# Patient Record
Sex: MALE | Race: WHITE | Employment: OTHER | URBAN - METROPOLITAN AREA
[De-identification: names, ages, dates, MRNs, and addresses within clinical notes are randomized per-mention and may not be internally consistent; named-entity substitution may affect disease eponyms.]

---

## 2017-02-13 PROBLEM — I65.29 CAROTID ARTERY STENOSIS: Status: ACTIVE | Noted: 2017-02-13

## 2017-02-16 ENCOUNTER — TELEPHONE (OUTPATIENT)
Dept: FAMILY MEDICINE CLINIC | Facility: CLINIC | Age: 72
End: 2017-02-16

## 2017-03-30 PROBLEM — I77.811 ECTATIC ABDOMINAL AORTA: Status: ACTIVE | Noted: 2017-03-30

## 2017-03-30 PROBLEM — I77.811 ECTATIC ABDOMINAL AORTA (HCC): Status: ACTIVE | Noted: 2017-03-30

## 2017-03-30 PROBLEM — I77.9 CAROTID ARTERY DISEASE: Status: ACTIVE | Noted: 2017-02-13

## 2017-03-30 PROBLEM — F10.21 ALCOHOL DEPENDENCE IN REMISSION (HCC): Status: ACTIVE | Noted: 2017-03-30

## 2017-03-30 PROBLEM — I70.0 ATHEROSCLEROSIS OF ABDOMINAL AORTA (HCC): Status: ACTIVE | Noted: 2017-03-30

## 2017-03-30 PROBLEM — I70.0 ATHEROSCLEROSIS OF ABDOMINAL AORTA: Status: ACTIVE | Noted: 2017-03-30

## 2017-03-30 PROBLEM — I70.0 AORTO-ILIAC ATHEROSCLEROSIS: Status: ACTIVE | Noted: 2017-03-30

## 2017-03-30 PROBLEM — I70.8 AORTO-ILIAC ATHEROSCLEROSIS: Status: ACTIVE | Noted: 2017-03-30

## 2017-03-30 PROBLEM — I70.8 AORTO-ILIAC ATHEROSCLEROSIS (HCC): Status: ACTIVE | Noted: 2017-03-30

## 2017-03-30 PROBLEM — I70.0 AORTO-ILIAC ATHEROSCLEROSIS (HCC): Status: ACTIVE | Noted: 2017-03-30

## 2017-03-30 PROBLEM — I77.9 CAROTID ARTERY DISEASE (HCC): Status: ACTIVE | Noted: 2017-02-13

## 2017-03-30 PROBLEM — I70.8 AORTO-ILIAC ATHEROSCLEROSIS  (HCC): Status: ACTIVE | Noted: 2017-03-30

## 2017-03-30 PROBLEM — I70.0 AORTO-ILIAC ATHEROSCLEROSIS  (HCC): Status: ACTIVE | Noted: 2017-03-30

## 2017-07-23 PROBLEM — I77.819 AORTIC ECTASIA (HCC): Status: ACTIVE | Noted: 2017-07-23

## 2017-07-23 PROBLEM — I77.819 AORTIC ECTASIA: Status: ACTIVE | Noted: 2017-07-23

## 2017-07-24 ENCOUNTER — MA CHART PREP (OUTPATIENT)
Dept: FAMILY MEDICINE CLINIC | Facility: CLINIC | Age: 72
End: 2017-07-24

## 2017-07-25 ENCOUNTER — OFFICE VISIT (OUTPATIENT)
Dept: FAMILY MEDICINE CLINIC | Facility: CLINIC | Age: 72
End: 2017-07-25

## 2017-07-25 ENCOUNTER — PATIENT MESSAGE (OUTPATIENT)
Dept: FAMILY MEDICINE CLINIC | Facility: CLINIC | Age: 72
End: 2017-07-25

## 2017-07-25 VITALS
BODY MASS INDEX: 21.81 KG/M2 | RESPIRATION RATE: 20 BRPM | TEMPERATURE: 99 F | OXYGEN SATURATION: 98 % | HEIGHT: 72 IN | SYSTOLIC BLOOD PRESSURE: 134 MMHG | DIASTOLIC BLOOD PRESSURE: 70 MMHG | HEART RATE: 55 BPM | WEIGHT: 161 LBS

## 2017-07-25 DIAGNOSIS — E78.00 HYPERCHOLESTEROLEMIA: ICD-10-CM

## 2017-07-25 DIAGNOSIS — I70.0 AORTO-ILIAC ATHEROSCLEROSIS (HCC): ICD-10-CM

## 2017-07-25 DIAGNOSIS — I10 ESSENTIAL HYPERTENSION: ICD-10-CM

## 2017-07-25 DIAGNOSIS — Z95.5 HISTORY OF HEART ARTERY STENT: ICD-10-CM

## 2017-07-25 DIAGNOSIS — I77.9 CAROTID ARTERY DISEASE, UNSPECIFIED LATERALITY (HCC): ICD-10-CM

## 2017-07-25 DIAGNOSIS — N40.2 PROSTATE NODULE: ICD-10-CM

## 2017-07-25 DIAGNOSIS — N40.1 BENIGN PROSTATIC HYPERPLASIA WITH WEAK URINARY STREAM: Primary | ICD-10-CM

## 2017-07-25 DIAGNOSIS — I70.8 AORTO-ILIAC ATHEROSCLEROSIS (HCC): ICD-10-CM

## 2017-07-25 DIAGNOSIS — N52.9 ED (ERECTILE DYSFUNCTION) OF ORGANIC ORIGIN: ICD-10-CM

## 2017-07-25 DIAGNOSIS — R39.12 BENIGN PROSTATIC HYPERPLASIA WITH WEAK URINARY STREAM: Primary | ICD-10-CM

## 2017-07-25 PROCEDURE — 96160 PT-FOCUSED HLTH RISK ASSMT: CPT | Performed by: INTERNAL MEDICINE

## 2017-07-25 RX ORDER — CLOPIDOGREL BISULFATE 75 MG/1
75 TABLET ORAL DAILY
Qty: 90 TABLET | Refills: 4 | Status: SHIPPED | OUTPATIENT
Start: 2017-07-25 | End: 2018-07-26

## 2017-07-25 RX ORDER — RANITIDINE 300 MG/1
TABLET ORAL
Qty: 90 TABLET | Refills: 4 | Status: SHIPPED | OUTPATIENT
Start: 2017-07-25 | End: 2018-01-04

## 2017-07-25 RX ORDER — METOPROLOL SUCCINATE 25 MG/1
25 TABLET, EXTENDED RELEASE ORAL DAILY
Qty: 90 TABLET | Refills: 4 | Status: SHIPPED | OUTPATIENT
Start: 2017-07-25 | End: 2018-08-08

## 2017-07-25 RX ORDER — LOSARTAN POTASSIUM 50 MG/1
50 TABLET ORAL DAILY
Qty: 90 TABLET | Refills: 4 | Status: SHIPPED | OUTPATIENT
Start: 2017-07-25 | End: 2018-08-08

## 2017-07-25 RX ORDER — SIMVASTATIN 20 MG
20 TABLET ORAL NIGHTLY
Qty: 90 TABLET | Refills: 4 | Status: SHIPPED | OUTPATIENT
Start: 2017-07-25 | End: 2017-08-17

## 2017-07-25 RX ORDER — FINASTERIDE 5 MG/1
5 TABLET, FILM COATED ORAL
Qty: 90 TABLET | Refills: 4 | Status: SHIPPED | OUTPATIENT
Start: 2017-07-25 | End: 2018-01-04

## 2017-07-25 NOTE — PROGRESS NOTES
James Mayfield is a 74year old male who presents for a MA Supervisit. No c/os today.      Patient Care Team: Patient Care Team:  Daly Mcwilliams DO as PCP - Silvia Bean MD as Consulting Physician (CARDIOLOGY)    Patient Active Pro kg/m².       Lab Results  Component Value Date   GLU 93 07/26/2016   GLU 88 07/02/2015   GLU 98 08/05/2014       Lab Results  Component Value Date   CHOLEST 132 07/26/2016   CHOLEST 155 07/02/2015   CHOLEST 147 08/05/2014       Lab Results  Component Value help    Taking medications as prescribed: Able without help    Are you able to afford your medications?: Yes    Hearing Problems?: No     Functional Status     Hearing Problems?: No    Vision Problems? : Yes    Difficulty walking?: No    Difficulty dressin Date Value   07/02/2015 88   ----------    Cardiovascular Disease Screening     LDL Annually LDL-CHOLESTEROL (mg/dL (calc))   Date Value   07/02/2015 79     LDL Cholesterol (mg/dL)   Date Value   07/26/2016 54        EKG - w/ Initial Preventative Physica Creatinine  Annually CREATININE (mg/dL)   Date Value   07/02/2015 1.04     Creatinine (mg/dL)   Date Value   07/26/2016 1.01    No flowsheet data found. Digoxin Serum Conc  Annually No results found for: DIGOXIN No flowsheet data found.     Diabetes mouth daily.  Disp:  Rfl:       MEDICAL INFORMATION:   Past Medical History:   Diagnosis Date   • Adverse drug reaction 12/10/2014   • Benign neoplasm of colon    • Diverticulosis of colon (without mention of hemorrhage)    • Hx of heart artery stent    • U Readings from Last 3 Encounters:  07/25/17 : 134/70  12/15/16 : 126/78  07/25/16 : 132/74      GENERAL: well developed, well nourished, in no apparent distress  SKIN: no rashes, no suspicious lesions  HEENT: atraumatic, normocephalic, ears and throat are c -     Continue Cardiology monitoring  -     LIPID PANEL; Future  -     CARDIO - INTERNAL    Aorto-iliac atherosclerosis ([address] stable        -     Continue Cardiology monitoring  -     LIPID PANEL;  Future    ED (erectile dysfunction) of organic origin

## 2017-07-25 NOTE — PATIENT INSTRUCTIONS
LAB HOURS & LOCATIONS        Doreen  [facility])    [address] [place] [address].  [address] [place] [address]     ([address])         [address] [place] [address] Ave    Mon-Fri  5am-8pm     Mon-Fri   7am-4pm  Sat         6am-3pm     Sat          7am-3pm      Isabel Diop

## 2017-08-08 PROBLEM — F10.21 ALCOHOL DEPENDENCE IN REMISSION (HCC): Status: RESOLVED | Noted: 2017-03-30 | Resolved: 2017-08-08

## 2017-08-17 DIAGNOSIS — E78.00 HYPERCHOLESTEROLEMIA: ICD-10-CM

## 2017-08-17 RX ORDER — SIMVASTATIN 20 MG
TABLET ORAL
Qty: 90 TABLET | Refills: 0 | Status: SHIPPED | OUTPATIENT
Start: 2017-08-17 | End: 2018-08-08

## 2017-08-17 NOTE — TELEPHONE ENCOUNTER
Last ov was 7/25/17  Last refill simvastatin 20 mg 90 pills 4/2017    .   Cholesterol:     Lab Results  Component Value Date   CHOLEST 132 07/26/2016   CHOLEST 155 07/02/2015   CHOLEST 147 08/05/2014     Lab Results  Component Value Date   HDL 50 07/26/2016

## 2017-08-28 ENCOUNTER — LAB ENCOUNTER (OUTPATIENT)
Dept: LAB | Age: 72
End: 2017-08-28
Payer: MEDICARE

## 2017-08-28 ENCOUNTER — PRIOR ORIGINAL RECORDS (OUTPATIENT)
Dept: OTHER | Age: 72
End: 2017-08-28

## 2017-08-28 DIAGNOSIS — D72.9 ABNORMAL WBC COUNT: Primary | ICD-10-CM

## 2017-08-28 DIAGNOSIS — I10 ESSENTIAL HYPERTENSION: ICD-10-CM

## 2017-08-28 DIAGNOSIS — N40.1 BENIGN PROSTATIC HYPERPLASIA WITH WEAK URINARY STREAM: ICD-10-CM

## 2017-08-28 DIAGNOSIS — R39.12 BENIGN PROSTATIC HYPERPLASIA WITH WEAK URINARY STREAM: ICD-10-CM

## 2017-08-28 DIAGNOSIS — E78.00 HYPERCHOLESTEROLEMIA: ICD-10-CM

## 2017-08-28 DIAGNOSIS — I70.0 AORTO-ILIAC ATHEROSCLEROSIS (HCC): ICD-10-CM

## 2017-08-28 DIAGNOSIS — I77.9 CAROTID ARTERY DISEASE, UNSPECIFIED LATERALITY (HCC): ICD-10-CM

## 2017-08-28 DIAGNOSIS — I70.8 AORTO-ILIAC ATHEROSCLEROSIS (HCC): ICD-10-CM

## 2017-08-28 LAB
ALBUMIN SERPL-MCNC: 3.7 G/DL (ref 3.5–4.8)
ALP LIVER SERPL-CCNC: 60 U/L (ref 45–117)
ALT SERPL-CCNC: 25 U/L (ref 17–63)
AST SERPL-CCNC: 18 U/L (ref 15–41)
BASOPHILS # BLD AUTO: 0.03 X10(3) UL (ref 0–0.1)
BASOPHILS NFR BLD AUTO: 0.8 %
BILIRUB SERPL-MCNC: 0.4 MG/DL (ref 0.1–2)
BUN BLD-MCNC: 24 MG/DL (ref 8–20)
CALCIUM BLD-MCNC: 8.8 MG/DL (ref 8.3–10.3)
CHLORIDE: 108 MMOL/L (ref 101–111)
CHOLEST SMN-MCNC: 136 MG/DL (ref ?–200)
CO2: 28 MMOL/L (ref 22–32)
COMPLEXED PSA SERPL-MCNC: 0.41 NG/ML (ref 0.01–4)
CREAT BLD-MCNC: 1.08 MG/DL (ref 0.7–1.3)
EOSINOPHIL # BLD AUTO: 0.4 X10(3) UL (ref 0–0.3)
EOSINOPHIL NFR BLD AUTO: 11.1 %
ERYTHROCYTE [DISTWIDTH] IN BLOOD BY AUTOMATED COUNT: 12.3 % (ref 11.5–16)
GLUCOSE BLD-MCNC: 88 MG/DL (ref 70–99)
HCT VFR BLD AUTO: 39.2 % (ref 37–53)
HDLC SERPL-MCNC: 64 MG/DL (ref 45–?)
HDLC SERPL: 2.13 {RATIO} (ref ?–4.97)
HGB BLD-MCNC: 12.9 G/DL (ref 13–17)
IMMATURE GRANULOCYTE COUNT: 0 X10(3) UL (ref 0–1)
IMMATURE GRANULOCYTE RATIO %: 0 %
LDLC SERPL CALC-MCNC: 60 MG/DL (ref ?–130)
LDLC SERPL-MCNC: 12 MG/DL (ref 5–40)
LYMPHOCYTES # BLD AUTO: 0.73 X10(3) UL (ref 0.9–4)
LYMPHOCYTES NFR BLD AUTO: 20.3 %
M PROTEIN MFR SERPL ELPH: 7.2 G/DL (ref 6.1–8.3)
MCH RBC QN AUTO: 33.4 PG (ref 27–33.2)
MCHC RBC AUTO-ENTMCNC: 32.9 G/DL (ref 31–37)
MCV RBC AUTO: 101.6 FL (ref 80–99)
MONOCYTES # BLD AUTO: 0.54 X10(3) UL (ref 0.1–0.6)
MONOCYTES NFR BLD AUTO: 15 %
NEUTROPHIL ABS PRELIM: 1.89 X10 (3) UL (ref 1.3–6.7)
NEUTROPHILS # BLD AUTO: 1.89 X10(3) UL (ref 1.3–6.7)
NEUTROPHILS NFR BLD AUTO: 52.8 %
NONHDLC SERPL-MCNC: 72 MG/DL (ref ?–130)
PLATELET # BLD AUTO: 188 10(3)UL (ref 150–450)
POTASSIUM SERPL-SCNC: 4.4 MMOL/L (ref 3.6–5.1)
RBC # BLD AUTO: 3.86 X10(6)UL (ref 3.8–5.8)
RED CELL DISTRIBUTION WIDTH-SD: 46 FL (ref 35.1–46.3)
SODIUM SERPL-SCNC: 139 MMOL/L (ref 136–144)
TRIGLYCERIDES: 59 MG/DL (ref ?–150)
WBC # BLD AUTO: 3.6 X10(3) UL (ref 4–13)

## 2017-08-28 PROCEDURE — 85025 COMPLETE CBC W/AUTO DIFF WBC: CPT

## 2017-08-28 PROCEDURE — 80053 COMPREHEN METABOLIC PANEL: CPT

## 2017-08-28 PROCEDURE — 36415 COLL VENOUS BLD VENIPUNCTURE: CPT

## 2017-08-28 PROCEDURE — 80061 LIPID PANEL: CPT

## 2017-09-28 ENCOUNTER — PRIOR ORIGINAL RECORDS (OUTPATIENT)
Dept: OTHER | Age: 72
End: 2017-09-28

## 2017-09-28 ENCOUNTER — LAB ENCOUNTER (OUTPATIENT)
Dept: LAB | Age: 72
End: 2017-09-28
Attending: INTERNAL MEDICINE
Payer: MEDICARE

## 2017-09-28 DIAGNOSIS — D72.9 ABNORMAL WBC COUNT: ICD-10-CM

## 2017-09-28 LAB
BASOPHILS # BLD AUTO: 0.04 X10(3) UL (ref 0–0.1)
BASOPHILS NFR BLD AUTO: 0.9 %
EOSINOPHIL # BLD AUTO: 0.44 X10(3) UL (ref 0–0.3)
EOSINOPHIL NFR BLD AUTO: 9.6 %
ERYTHROCYTE [DISTWIDTH] IN BLOOD BY AUTOMATED COUNT: 12.3 % (ref 11.5–16)
HCT VFR BLD AUTO: 39.5 % (ref 37–53)
HGB BLD-MCNC: 13.4 G/DL (ref 13–17)
IMMATURE GRANULOCYTE COUNT: 0.01 X10(3) UL (ref 0–1)
IMMATURE GRANULOCYTE RATIO %: 0.2 %
LYMPHOCYTES # BLD AUTO: 0.74 X10(3) UL (ref 0.9–4)
LYMPHOCYTES NFR BLD AUTO: 16.2 %
MCH RBC QN AUTO: 34.2 PG (ref 27–33.2)
MCHC RBC AUTO-ENTMCNC: 33.9 G/DL (ref 31–37)
MCV RBC AUTO: 100.8 FL (ref 80–99)
MONOCYTES # BLD AUTO: 0.54 X10(3) UL (ref 0.1–0.6)
MONOCYTES NFR BLD AUTO: 11.8 %
NEUTROPHIL ABS PRELIM: 2.79 X10 (3) UL (ref 1.3–6.7)
NEUTROPHILS # BLD AUTO: 2.79 X10(3) UL (ref 1.3–6.7)
NEUTROPHILS NFR BLD AUTO: 61.3 %
PLATELET # BLD AUTO: 200 10(3)UL (ref 150–450)
RBC # BLD AUTO: 3.92 X10(6)UL (ref 3.8–5.8)
RED CELL DISTRIBUTION WIDTH-SD: 45.7 FL (ref 35.1–46.3)
WBC # BLD AUTO: 4.6 X10(3) UL (ref 4–13)

## 2017-09-28 PROCEDURE — 36415 COLL VENOUS BLD VENIPUNCTURE: CPT

## 2017-09-28 PROCEDURE — 85025 COMPLETE CBC W/AUTO DIFF WBC: CPT

## 2017-10-13 ENCOUNTER — PRIOR ORIGINAL RECORDS (OUTPATIENT)
Dept: OTHER | Age: 72
End: 2017-10-13

## 2017-10-17 ENCOUNTER — MYAURORA ACCOUNT LINK (OUTPATIENT)
Dept: OTHER | Age: 72
End: 2017-10-17

## 2017-10-17 ENCOUNTER — HOSPITAL ENCOUNTER (OUTPATIENT)
Dept: CARDIOLOGY CLINIC | Facility: HOSPITAL | Age: 72
Discharge: HOME | End: 2017-10-17
Attending: INTERNAL MEDICINE

## 2017-10-17 ENCOUNTER — PRIOR ORIGINAL RECORDS (OUTPATIENT)
Dept: OTHER | Age: 72
End: 2017-10-17

## 2017-10-17 DIAGNOSIS — I65.29 OCCLUSION AND STENOSIS OF CAROTID ARTERY: ICD-10-CM

## 2017-10-18 LAB
ALKALINE PHOSPHATATE(ALK PHOS): 60 IU/L
BILIRUBIN TOTAL: 0.4 MG/DL
BUN: 24 MG/DL
CALCIUM: 8.8 MG/DL
CHLORIDE: 108 MEQ/L
CHOLESTEROL, TOTAL: 136 MG/DL
CREATININE, SERUM: 1.08 MG/DL
GLUCOSE: 88 MG/DL
HDL CHOLESTEROL: 64 MG/DL
LDL CHOLESTEROL: 60 MG/DL
POTASSIUM, SERUM: 4.4 MEQ/L
PROTEIN, TOTAL: 7.2 G/DL
SGOT (AST): 18 IU/L
SGPT (ALT): 25 IU/L
SODIUM: 139 MEQ/L
TRIGLYCERIDES: 59 MG/DL

## 2017-10-25 LAB
HEMATOCRIT: 39.2 %
HEMATOCRIT: 39.5 %
HEMOGLOBIN: 12.9 G/DL
HEMOGLOBIN: 13.4 G/DL
PLATELETS: 188 K/UL
PLATELETS: 200 K/UL
RED BLOOD COUNT: 3.86 X 10-6/U
RED BLOOD COUNT: 3.92 X 10-6/U
WHITE BLOOD COUNT: 3.6 X 10-3/U
WHITE BLOOD COUNT: 4.6 X 10-3/U

## 2017-10-31 ENCOUNTER — HOSPITAL ENCOUNTER (OUTPATIENT)
Dept: CV DIAGNOSTICS | Facility: HOSPITAL | Age: 72
Discharge: HOME | End: 2017-10-31
Attending: INTERNAL MEDICINE
Payer: MEDICARE

## 2017-10-31 DIAGNOSIS — I25.10 CORONARY ATHEROSCLEROSIS OF NATIVE CORONARY ARTERY: ICD-10-CM

## 2017-10-31 DIAGNOSIS — Z98.61 POST PTCA: ICD-10-CM

## 2017-10-31 PROCEDURE — 93017 CV STRESS TEST TRACING ONLY: CPT | Performed by: INTERNAL MEDICINE

## 2017-10-31 PROCEDURE — 93018 CV STRESS TEST I&R ONLY: CPT | Performed by: INTERNAL MEDICINE

## 2017-10-31 PROCEDURE — 93350 STRESS TTE ONLY: CPT | Performed by: INTERNAL MEDICINE

## 2018-01-04 ENCOUNTER — TELEPHONE (OUTPATIENT)
Dept: FAMILY MEDICINE CLINIC | Facility: CLINIC | Age: 73
End: 2018-01-04

## 2018-01-04 ENCOUNTER — OFFICE VISIT (OUTPATIENT)
Dept: FAMILY MEDICINE CLINIC | Facility: CLINIC | Age: 73
End: 2018-01-04

## 2018-01-04 VITALS
DIASTOLIC BLOOD PRESSURE: 66 MMHG | TEMPERATURE: 98 F | WEIGHT: 169 LBS | RESPIRATION RATE: 16 BRPM | HEART RATE: 80 BPM | BODY MASS INDEX: 22.89 KG/M2 | SYSTOLIC BLOOD PRESSURE: 112 MMHG | HEIGHT: 72 IN

## 2018-01-04 DIAGNOSIS — T82.897D CORONARY STENT OCCLUSION, SUBSEQUENT ENCOUNTER: Primary | ICD-10-CM

## 2018-01-04 DIAGNOSIS — N40.2 PROSTATE NODULE: ICD-10-CM

## 2018-01-04 DIAGNOSIS — N52.9 ED (ERECTILE DYSFUNCTION) OF ORGANIC ORIGIN: ICD-10-CM

## 2018-01-04 DIAGNOSIS — I25.10 CORONARY ARTERY DISEASE DUE TO CALCIFIED CORONARY LESION: ICD-10-CM

## 2018-01-04 DIAGNOSIS — I25.84 CORONARY ARTERY DISEASE DUE TO CALCIFIED CORONARY LESION: ICD-10-CM

## 2018-01-04 PROCEDURE — 99213 OFFICE O/P EST LOW 20 MIN: CPT | Performed by: FAMILY MEDICINE

## 2018-01-04 RX ORDER — RANITIDINE 300 MG/1
TABLET ORAL
Qty: 90 TABLET | Refills: 4 | Status: SHIPPED | OUTPATIENT
Start: 2018-01-04 | End: 2018-08-08

## 2018-01-04 RX ORDER — HYDROCODONE BITARTRATE AND ACETAMINOPHEN 10; 325 MG/1; MG/1
1 TABLET ORAL EVERY 6 HOURS PRN
Qty: 30 TABLET | Refills: 0 | Status: SHIPPED | OUTPATIENT
Start: 2018-01-04 | End: 2018-08-08 | Stop reason: ALTCHOICE

## 2018-01-04 RX ORDER — SILDENAFIL 100 MG/1
100 TABLET, FILM COATED ORAL AS NEEDED
Qty: 4 TABLET | Refills: 3 | Status: SHIPPED | OUTPATIENT
Start: 2018-01-04 | End: 2018-08-08

## 2018-01-04 RX ORDER — FINASTERIDE 5 MG/1
5 TABLET, FILM COATED ORAL DAILY
Qty: 90 TABLET | Refills: 3 | Status: SHIPPED | OUTPATIENT
Start: 2018-01-04 | End: 2018-08-08

## 2018-01-04 RX ORDER — FINASTERIDE 5 MG/1
5 TABLET, FILM COATED ORAL
Qty: 90 TABLET | Refills: 4 | Status: SHIPPED | OUTPATIENT
Start: 2018-01-04 | End: 2018-01-04 | Stop reason: SDUPTHER

## 2018-01-04 RX ORDER — SILDENAFIL 100 MG/1
100 TABLET, FILM COATED ORAL AS NEEDED
Qty: 4 TABLET | Refills: 3 | Status: SHIPPED | OUTPATIENT
Start: 2018-01-04 | End: 2018-01-04

## 2018-01-04 RX ORDER — CYCLOBENZAPRINE HCL 10 MG
10 TABLET ORAL 3 TIMES DAILY
Qty: 30 TABLET | Refills: 1 | Status: SHIPPED | OUTPATIENT
Start: 2018-01-04 | End: 2018-01-24

## 2018-01-04 NOTE — PROGRESS NOTES
It has been a while since I seen this gentleman. Since that time he has met a woman with many shared traits and they moved in together in April.   He sees the cardiologist regularly he has had a stent placed a number of years ago and is doing very very wel

## 2018-07-26 DIAGNOSIS — E78.00 HYPERCHOLESTEROLEMIA: ICD-10-CM

## 2018-07-26 RX ORDER — CLOPIDOGREL BISULFATE 75 MG/1
TABLET ORAL
Qty: 90 TABLET | Refills: 0 | Status: SHIPPED | OUTPATIENT
Start: 2018-07-26 | End: 2018-08-08

## 2018-08-08 ENCOUNTER — OFFICE VISIT (OUTPATIENT)
Dept: FAMILY MEDICINE CLINIC | Facility: CLINIC | Age: 73
End: 2018-08-08
Payer: MEDICARE

## 2018-08-08 VITALS
WEIGHT: 166 LBS | RESPIRATION RATE: 16 BRPM | BODY MASS INDEX: 22.48 KG/M2 | DIASTOLIC BLOOD PRESSURE: 82 MMHG | HEART RATE: 76 BPM | HEIGHT: 72 IN | SYSTOLIC BLOOD PRESSURE: 146 MMHG | OXYGEN SATURATION: 97 % | TEMPERATURE: 98 F

## 2018-08-08 DIAGNOSIS — I10 ESSENTIAL HYPERTENSION: ICD-10-CM

## 2018-08-08 DIAGNOSIS — Z11.59 ENCOUNTER FOR HEPATITIS C SCREENING TEST FOR LOW RISK PATIENT: ICD-10-CM

## 2018-08-08 DIAGNOSIS — I77.9 CAROTID ARTERY DISEASE, UNSPECIFIED LATERALITY, UNSPECIFIED TYPE (HCC): Primary | ICD-10-CM

## 2018-08-08 DIAGNOSIS — E78.00 HYPERCHOLESTEROLEMIA: ICD-10-CM

## 2018-08-08 DIAGNOSIS — Z12.5 ENCOUNTER FOR SCREENING FOR MALIGNANT NEOPLASM OF PROSTATE: ICD-10-CM

## 2018-08-08 DIAGNOSIS — Z13.1 ENCOUNTER FOR SCREENING FOR DIABETES MELLITUS: ICD-10-CM

## 2018-08-08 DIAGNOSIS — E55.9 HYPOVITAMINOSIS D: ICD-10-CM

## 2018-08-08 DIAGNOSIS — Z12.11 ENCOUNTER FOR SCREENING FOR MALIGNANT NEOPLASM OF COLON: ICD-10-CM

## 2018-08-08 PROBLEM — I70.0 ATHEROSCLEROSIS OF ABDOMINAL AORTA (HCC): Status: RESOLVED | Noted: 2017-03-30 | Resolved: 2018-08-08

## 2018-08-08 PROBLEM — I77.811 ECTATIC ABDOMINAL AORTA: Status: RESOLVED | Noted: 2017-03-30 | Resolved: 2018-08-08

## 2018-08-08 PROBLEM — I70.0 ATHEROSCLEROSIS OF ABDOMINAL AORTA: Status: RESOLVED | Noted: 2017-03-30 | Resolved: 2018-08-08

## 2018-08-08 PROBLEM — I77.811 ECTATIC ABDOMINAL AORTA (HCC): Status: RESOLVED | Noted: 2017-03-30 | Resolved: 2018-08-08

## 2018-08-08 PROCEDURE — 96160 PT-FOCUSED HLTH RISK ASSMT: CPT | Performed by: FAMILY MEDICINE

## 2018-08-08 PROCEDURE — G0438 PPPS, INITIAL VISIT: HCPCS | Performed by: FAMILY MEDICINE

## 2018-08-08 RX ORDER — SILDENAFIL 100 MG/1
100 TABLET, FILM COATED ORAL AS NEEDED
Qty: 12 TABLET | Refills: 3 | Status: SHIPPED | OUTPATIENT
Start: 2018-08-08 | End: 2018-08-08

## 2018-08-08 RX ORDER — RANITIDINE 300 MG/1
TABLET ORAL
Qty: 90 TABLET | Refills: 3 | Status: SHIPPED | OUTPATIENT
Start: 2018-08-08 | End: 2019-08-01

## 2018-08-08 RX ORDER — METOPROLOL SUCCINATE 25 MG/1
25 TABLET, EXTENDED RELEASE ORAL DAILY
Qty: 90 TABLET | Refills: 3 | Status: SHIPPED | OUTPATIENT
Start: 2018-08-08 | End: 2019-08-01

## 2018-08-08 RX ORDER — SIMVASTATIN 20 MG
TABLET ORAL
Qty: 90 TABLET | Refills: 3 | Status: SHIPPED | OUTPATIENT
Start: 2018-08-08 | End: 2019-08-01

## 2018-08-08 RX ORDER — CLOPIDOGREL BISULFATE 75 MG/1
75 TABLET ORAL
Qty: 90 TABLET | Refills: 3 | Status: SHIPPED | OUTPATIENT
Start: 2018-08-08 | End: 2019-08-01

## 2018-08-08 RX ORDER — FINASTERIDE 5 MG/1
5 TABLET, FILM COATED ORAL DAILY
Qty: 90 TABLET | Refills: 3 | Status: SHIPPED | OUTPATIENT
Start: 2018-08-08 | End: 2019-08-01

## 2018-08-08 RX ORDER — SILDENAFIL 100 MG/1
100 TABLET, FILM COATED ORAL AS NEEDED
Qty: 4 TABLET | Refills: 11 | Status: SHIPPED | OUTPATIENT
Start: 2018-08-08 | End: 2019-08-01

## 2018-08-08 RX ORDER — LOSARTAN POTASSIUM 50 MG/1
50 TABLET ORAL DAILY
Qty: 90 TABLET | Refills: 3 | Status: SHIPPED | OUTPATIENT
Start: 2018-08-08 | End: 2019-08-01

## 2018-08-08 NOTE — PROGRESS NOTES
Patient presents for [employer] visit. Has no other complaints. He is now involved with the very happy relationship with a woman named Michael Ortega there sharing their senior care years together and things are going well. Please refer to the template.     In

## 2018-09-11 ENCOUNTER — LAB ENCOUNTER (OUTPATIENT)
Dept: LAB | Age: 73
End: 2018-09-11
Attending: FAMILY MEDICINE
Payer: MEDICARE

## 2018-09-11 ENCOUNTER — PRIOR ORIGINAL RECORDS (OUTPATIENT)
Dept: OTHER | Age: 73
End: 2018-09-11

## 2018-09-11 DIAGNOSIS — E55.9 HYPOVITAMINOSIS D: ICD-10-CM

## 2018-09-11 DIAGNOSIS — Z12.5 ENCOUNTER FOR SCREENING FOR MALIGNANT NEOPLASM OF PROSTATE: ICD-10-CM

## 2018-09-11 DIAGNOSIS — I77.9 CAROTID ARTERY DISEASE, UNSPECIFIED LATERALITY, UNSPECIFIED TYPE (HCC): ICD-10-CM

## 2018-09-11 DIAGNOSIS — Z13.1 ENCOUNTER FOR SCREENING FOR DIABETES MELLITUS: ICD-10-CM

## 2018-09-11 DIAGNOSIS — Z11.59 ENCOUNTER FOR HEPATITIS C SCREENING TEST FOR LOW RISK PATIENT: ICD-10-CM

## 2018-09-11 DIAGNOSIS — I10 ESSENTIAL HYPERTENSION: ICD-10-CM

## 2018-09-11 DIAGNOSIS — E78.00 HYPERCHOLESTEROLEMIA: ICD-10-CM

## 2018-09-11 DIAGNOSIS — Z12.11 ENCOUNTER FOR SCREENING FOR MALIGNANT NEOPLASM OF COLON: ICD-10-CM

## 2018-09-11 LAB
ALBUMIN SERPL-MCNC: 3.8 G/DL (ref 3.5–4.8)
ALBUMIN/GLOB SERPL: 1.1 {RATIO} (ref 1–2)
ALP LIVER SERPL-CCNC: 57 U/L (ref 45–117)
ALT SERPL-CCNC: 22 U/L (ref 17–63)
ANION GAP SERPL CALC-SCNC: 6 MMOL/L (ref 0–18)
AST SERPL-CCNC: 24 U/L (ref 15–41)
BASOPHILS # BLD AUTO: 0.02 X10(3) UL (ref 0–0.1)
BASOPHILS NFR BLD AUTO: 0.6 %
BILIRUB SERPL-MCNC: 0.6 MG/DL (ref 0.1–2)
BUN BLD-MCNC: 25 MG/DL (ref 8–20)
BUN/CREAT SERPL: 22.1 (ref 10–20)
CALCIUM BLD-MCNC: 9.2 MG/DL (ref 8.3–10.3)
CHLORIDE SERPL-SCNC: 109 MMOL/L (ref 101–111)
CHOLEST SMN-MCNC: 158 MG/DL (ref ?–200)
CO2 SERPL-SCNC: 27 MMOL/L (ref 22–32)
COMPLEXED PSA SERPL-MCNC: 0.43 NG/ML (ref 0.01–4)
CREAT BLD-MCNC: 1.13 MG/DL (ref 0.7–1.3)
EOSINOPHIL # BLD AUTO: 0.29 X10(3) UL (ref 0–0.3)
EOSINOPHIL NFR BLD AUTO: 8.2 %
ERYTHROCYTE [DISTWIDTH] IN BLOOD BY AUTOMATED COUNT: 12.4 % (ref 11.5–16)
EST. AVERAGE GLUCOSE BLD GHB EST-MCNC: 117 MG/DL (ref 68–126)
GLOBULIN PLAS-MCNC: 3.6 G/DL (ref 2.5–4)
GLUCOSE BLD-MCNC: 82 MG/DL (ref 70–99)
HBA1C MFR BLD HPLC: 5.7 % (ref ?–5.7)
HCT VFR BLD AUTO: 42.5 % (ref 37–53)
HCV AB SERPL QL IA: NONREACTIVE
HDLC SERPL-MCNC: 66 MG/DL (ref 40–59)
HGB BLD-MCNC: 13.7 G/DL (ref 13–17)
IMMATURE GRANULOCYTE COUNT: 0 X10(3) UL (ref 0–1)
IMMATURE GRANULOCYTE RATIO %: 0 %
LDLC SERPL CALC-MCNC: 78 MG/DL (ref ?–100)
LYMPHOCYTES # BLD AUTO: 0.8 X10(3) UL (ref 0.9–4)
LYMPHOCYTES NFR BLD AUTO: 22.7 %
M PROTEIN MFR SERPL ELPH: 7.4 G/DL (ref 6.1–8.3)
MCH RBC QN AUTO: 33.7 PG (ref 27–33.2)
MCHC RBC AUTO-ENTMCNC: 32.2 G/DL (ref 31–37)
MCV RBC AUTO: 104.4 FL (ref 80–99)
MONOCYTES # BLD AUTO: 0.49 X10(3) UL (ref 0.1–1)
MONOCYTES NFR BLD AUTO: 13.9 %
NEUTROPHIL ABS PRELIM: 1.92 X10 (3) UL (ref 1.3–6.7)
NEUTROPHILS # BLD AUTO: 1.92 X10(3) UL (ref 1.3–6.7)
NEUTROPHILS NFR BLD AUTO: 54.6 %
NONHDLC SERPL-MCNC: 92 MG/DL (ref ?–130)
OSMOLALITY SERPL CALC.SUM OF ELEC: 297 MOSM/KG (ref 275–295)
PLATELET # BLD AUTO: 201 10(3)UL (ref 150–450)
POTASSIUM SERPL-SCNC: 4.6 MMOL/L (ref 3.6–5.1)
RBC # BLD AUTO: 4.07 X10(6)UL (ref 3.8–5.8)
RED CELL DISTRIBUTION WIDTH-SD: 48.1 FL (ref 35.1–46.3)
SODIUM SERPL-SCNC: 142 MMOL/L (ref 136–144)
TRIGL SERPL-MCNC: 69 MG/DL (ref 30–149)
TSI SER-ACNC: 3.4 MIU/ML (ref 0.35–5.5)
VIT D+METAB SERPL-MCNC: 24.9 NG/ML (ref 30–100)
VLDLC SERPL CALC-MCNC: 14 MG/DL (ref 0–30)
WBC # BLD AUTO: 3.5 X10(3) UL (ref 4–13)

## 2018-09-11 PROCEDURE — 36415 COLL VENOUS BLD VENIPUNCTURE: CPT

## 2018-09-11 PROCEDURE — 85025 COMPLETE CBC W/AUTO DIFF WBC: CPT

## 2018-09-11 PROCEDURE — 83036 HEMOGLOBIN GLYCOSYLATED A1C: CPT

## 2018-09-11 PROCEDURE — 80053 COMPREHEN METABOLIC PANEL: CPT

## 2018-09-11 PROCEDURE — 80061 LIPID PANEL: CPT

## 2018-09-11 PROCEDURE — 82306 VITAMIN D 25 HYDROXY: CPT

## 2018-09-11 PROCEDURE — 86803 HEPATITIS C AB TEST: CPT

## 2018-09-11 PROCEDURE — 84443 ASSAY THYROID STIM HORMONE: CPT

## 2018-09-12 ENCOUNTER — IMMUNIZATION (OUTPATIENT)
Dept: FAMILY MEDICINE CLINIC | Facility: CLINIC | Age: 73
End: 2018-09-12
Payer: MEDICARE

## 2018-09-12 PROCEDURE — 90653 IIV ADJUVANT VACCINE IM: CPT | Performed by: FAMILY MEDICINE

## 2018-09-12 PROCEDURE — G0008 ADMIN INFLUENZA VIRUS VAC: HCPCS | Performed by: FAMILY MEDICINE

## 2018-09-18 ENCOUNTER — OFFICE VISIT (OUTPATIENT)
Dept: FAMILY MEDICINE CLINIC | Facility: CLINIC | Age: 73
End: 2018-09-18

## 2018-09-18 VITALS
HEIGHT: 72 IN | BODY MASS INDEX: 22.89 KG/M2 | OXYGEN SATURATION: 97 % | DIASTOLIC BLOOD PRESSURE: 82 MMHG | RESPIRATION RATE: 16 BRPM | WEIGHT: 169 LBS | HEART RATE: 52 BPM | SYSTOLIC BLOOD PRESSURE: 132 MMHG

## 2018-09-18 DIAGNOSIS — D75.89 MACROCYTOSIS WITHOUT ANEMIA: Primary | ICD-10-CM

## 2018-09-18 PROCEDURE — 99213 OFFICE O/P EST LOW 20 MIN: CPT | Performed by: FAMILY MEDICINE

## 2018-09-18 RX ORDER — FOLIC ACID 1 MG/1
1 TABLET ORAL DAILY
Qty: 90 TABLET | Refills: 0 | Status: SHIPPED | OUTPATIENT
Start: 2018-09-18 | End: 2019-02-28 | Stop reason: ALTCHOICE

## 2018-09-18 NOTE — PROGRESS NOTES
Here I my request to review recent blood work. All of which are unremarkable with the exception of an MCV of 104. He is otherwise entirely asymptomatic.   He readily admits that he is become a regular drink wine drinker drinking as much as to or perhaps m

## 2018-10-12 ENCOUNTER — PRIOR ORIGINAL RECORDS (OUTPATIENT)
Dept: OTHER | Age: 73
End: 2018-10-12

## 2018-10-12 ENCOUNTER — MYAURORA ACCOUNT LINK (OUTPATIENT)
Dept: OTHER | Age: 73
End: 2018-10-12

## 2018-10-18 LAB
ALBUMIN: 3.8 G/DL
ALKALINE PHOSPHATATE(ALK PHOS): 57 IU/L
BILIRUBIN TOTAL: 0.6 MG/DL
BUN: 25 MG/DL
CALCIUM: 9.2 MG/DL
CHLORIDE: 109 MEQ/L
CHOLESTEROL, TOTAL: 158 MG/DL
CREATININE, SERUM: 1.13 MG/DL
GLOBULIN: 3.6 G/DL
GLUCOSE: 82 MG/DL
HDL CHOLESTEROL: 66 MG/DL
HEMATOCRIT: 42.5 %
HEMOGLOBIN A1C: 5.7 %
HEMOGLOBIN: 13.7 G/DL
LDL CHOLESTEROL: 78 MG/DL
NON-HDL CHOLESTEROL: 92 MG/DL
PLATELETS: 201 K/UL
POTASSIUM, SERUM: 4.6 MEQ/L
PROTEIN, TOTAL: 7.4 G/DL
RED BLOOD COUNT: 4.07 X 10-6/U
SGOT (AST): 24 IU/L
SGPT (ALT): 22 IU/L
SODIUM: 142 MEQ/L
THYROID STIMULATING HORMONE: 3.4 MLU/L
TRIGLYCERIDES: 69 MG/DL
VITAMIN D 25-OH: 24.9 NG/ML
WHITE BLOOD COUNT: 3.5 X 10-3/U

## 2019-02-28 ENCOUNTER — LAB ENCOUNTER (OUTPATIENT)
Dept: LAB | Age: 74
End: 2019-02-28
Attending: FAMILY MEDICINE
Payer: MEDICARE

## 2019-02-28 ENCOUNTER — HOSPITAL ENCOUNTER (OUTPATIENT)
Dept: GENERAL RADIOLOGY | Age: 74
Discharge: HOME | End: 2019-02-28
Attending: FAMILY MEDICINE
Payer: MEDICARE

## 2019-02-28 ENCOUNTER — OFFICE VISIT (OUTPATIENT)
Dept: FAMILY MEDICINE CLINIC | Facility: CLINIC | Age: 74
End: 2019-02-28
Payer: MEDICARE

## 2019-02-28 VITALS
DIASTOLIC BLOOD PRESSURE: 68 MMHG | HEART RATE: 64 BPM | HEIGHT: 72 IN | SYSTOLIC BLOOD PRESSURE: 110 MMHG | WEIGHT: 165 LBS | BODY MASS INDEX: 22.35 KG/M2

## 2019-02-28 VITALS
HEART RATE: 61 BPM | WEIGHT: 160 LBS | SYSTOLIC BLOOD PRESSURE: 126 MMHG | HEIGHT: 72 IN | DIASTOLIC BLOOD PRESSURE: 60 MMHG | BODY MASS INDEX: 21.67 KG/M2

## 2019-02-28 VITALS
BODY MASS INDEX: 21.94 KG/M2 | OXYGEN SATURATION: 94 % | SYSTOLIC BLOOD PRESSURE: 136 MMHG | WEIGHT: 162 LBS | RESPIRATION RATE: 16 BRPM | HEIGHT: 72 IN | HEART RATE: 66 BPM | DIASTOLIC BLOOD PRESSURE: 78 MMHG | TEMPERATURE: 97 F

## 2019-02-28 DIAGNOSIS — M54.50 CHRONIC LOW BACK PAIN WITHOUT SCIATICA, UNSPECIFIED BACK PAIN LATERALITY: ICD-10-CM

## 2019-02-28 DIAGNOSIS — D64.9 ANEMIA, UNSPECIFIED TYPE: ICD-10-CM

## 2019-02-28 DIAGNOSIS — M54.50 CHRONIC LOW BACK PAIN WITHOUT SCIATICA, UNSPECIFIED BACK PAIN LATERALITY: Primary | ICD-10-CM

## 2019-02-28 DIAGNOSIS — G89.29 CHRONIC LOW BACK PAIN WITHOUT SCIATICA, UNSPECIFIED BACK PAIN LATERALITY: ICD-10-CM

## 2019-02-28 DIAGNOSIS — G89.29 CHRONIC LOW BACK PAIN WITHOUT SCIATICA, UNSPECIFIED BACK PAIN LATERALITY: Primary | ICD-10-CM

## 2019-02-28 DIAGNOSIS — E55.9 HYPOVITAMINOSIS D: ICD-10-CM

## 2019-02-28 LAB
ALBUMIN SERPL-MCNC: 4.3 G/DL (ref 3.4–5)
ALBUMIN/GLOB SERPL: 1.1 {RATIO} (ref 1–2)
ALP LIVER SERPL-CCNC: 63 U/L (ref 45–117)
ALT SERPL-CCNC: 23 U/L (ref 16–61)
ANION GAP SERPL CALC-SCNC: 5 MMOL/L (ref 0–18)
AST SERPL-CCNC: 26 U/L (ref 15–37)
BASOPHILS # BLD AUTO: 0.05 X10(3) UL (ref 0–0.2)
BASOPHILS NFR BLD AUTO: 1.3 %
BILIRUB SERPL-MCNC: 0.5 MG/DL (ref 0.1–2)
BUN BLD-MCNC: 29 MG/DL (ref 7–18)
BUN/CREAT SERPL: 26.6 (ref 10–20)
CALCIUM BLD-MCNC: 9.2 MG/DL (ref 8.5–10.1)
CHLORIDE SERPL-SCNC: 109 MMOL/L (ref 98–107)
CO2 SERPL-SCNC: 28 MMOL/L (ref 21–32)
CREAT BLD-MCNC: 1.09 MG/DL (ref 0.7–1.3)
DEPRECATED RDW RBC AUTO: 44.8 FL (ref 35.1–46.3)
EOSINOPHIL # BLD AUTO: 0.3 X10(3) UL (ref 0–0.7)
EOSINOPHIL NFR BLD AUTO: 7.7 %
ERYTHROCYTE [DISTWIDTH] IN BLOOD BY AUTOMATED COUNT: 12 % (ref 11–15)
GLOBULIN PLAS-MCNC: 3.8 G/DL (ref 2.8–4.4)
GLUCOSE BLD-MCNC: 96 MG/DL (ref 70–99)
HCT VFR BLD AUTO: 42.4 % (ref 39–53)
HGB BLD-MCNC: 14.2 G/DL (ref 13–17.5)
IMM GRANULOCYTES # BLD AUTO: 0.01 X10(3) UL (ref 0–1)
IMM GRANULOCYTES NFR BLD: 0.3 %
LYMPHOCYTES # BLD AUTO: 0.86 X10(3) UL (ref 1–4)
LYMPHOCYTES NFR BLD AUTO: 22.1 %
M PROTEIN MFR SERPL ELPH: 8.1 G/DL (ref 6.4–8.2)
MCH RBC QN AUTO: 33.6 PG (ref 26–34)
MCHC RBC AUTO-ENTMCNC: 33.5 G/DL (ref 31–37)
MCV RBC AUTO: 100.5 FL (ref 80–100)
MONOCYTES # BLD AUTO: 0.52 X10(3) UL (ref 0.1–1)
MONOCYTES NFR BLD AUTO: 13.4 %
NEUTROPHILS # BLD AUTO: 2.15 X10 (3) UL (ref 1.5–7.7)
NEUTROPHILS # BLD AUTO: 2.15 X10(3) UL (ref 1.5–7.7)
NEUTROPHILS NFR BLD AUTO: 55.2 %
OSMOLALITY SERPL CALC.SUM OF ELEC: 300 MOSM/KG (ref 275–295)
PLATELET # BLD AUTO: 206 10(3)UL (ref 150–450)
POTASSIUM SERPL-SCNC: 4.4 MMOL/L (ref 3.5–5.1)
RBC # BLD AUTO: 4.22 X10(6)UL (ref 3.8–5.8)
SODIUM SERPL-SCNC: 142 MMOL/L (ref 136–145)
VIT D+METAB SERPL-MCNC: 40.6 NG/ML (ref 30–100)
WBC # BLD AUTO: 3.9 X10(3) UL (ref 4–11)

## 2019-02-28 PROCEDURE — 85025 COMPLETE CBC W/AUTO DIFF WBC: CPT

## 2019-02-28 PROCEDURE — 80053 COMPREHEN METABOLIC PANEL: CPT

## 2019-02-28 PROCEDURE — 82306 VITAMIN D 25 HYDROXY: CPT

## 2019-02-28 PROCEDURE — 72110 X-RAY EXAM L-2 SPINE 4/>VWS: CPT | Performed by: FAMILY MEDICINE

## 2019-02-28 PROCEDURE — 36415 COLL VENOUS BLD VENIPUNCTURE: CPT

## 2019-02-28 PROCEDURE — 99213 OFFICE O/P EST LOW 20 MIN: CPT | Performed by: FAMILY MEDICINE

## 2019-02-28 NOTE — PROGRESS NOTES
Presents today for 2 reasons first needs a repeat blood work. Panel. He then wishes to discuss very chronic nonprogressive low back pain without radiculopathy. There is no weakness.   Today's exam is otherwise unremarkable his gait is normal.  Coincide

## 2019-03-07 ENCOUNTER — TELEPHONE (OUTPATIENT)
Dept: FAMILY MEDICINE CLINIC | Facility: CLINIC | Age: 74
End: 2019-03-07

## 2019-03-07 DIAGNOSIS — R79.89 LOW VITAMIN B12 LEVEL: ICD-10-CM

## 2019-03-07 DIAGNOSIS — I10 ESSENTIAL HYPERTENSION: Primary | ICD-10-CM

## 2019-03-07 DIAGNOSIS — E55.9 HYPOVITAMINOSIS D: ICD-10-CM

## 2019-03-11 NOTE — TELEPHONE ENCOUNTER
Please review lab results and advise. Per Perla Section, okay for PT if pt wants regarding xray results.

## 2019-03-11 NOTE — TELEPHONE ENCOUNTER
Patient was following up on lab results. Still has not heard if there are any recommendations. Please advise.

## 2019-03-12 NOTE — TELEPHONE ENCOUNTER
Consulted with Dr Yessenia Borja - pt to reduce alcohol intake, begin taking an OTC B complex supplement and repeat tests in 4 months. Attempted to call pt - no answer.     Labs ordered

## 2019-04-16 RX ORDER — FINASTERIDE 5 MG/1
TABLET, FILM COATED ORAL
COMMUNITY
Start: 2013-01-11

## 2019-04-16 RX ORDER — CHOLECALCIFEROL (VITAMIN D3) 125 MCG
CAPSULE ORAL
COMMUNITY
Start: 2018-10-12 | End: 2019-10-02

## 2019-04-16 RX ORDER — SIMVASTATIN 20 MG
TABLET ORAL
COMMUNITY

## 2019-04-16 RX ORDER — FOLIC ACID 1 MG/1
1 TABLET ORAL DAILY
COMMUNITY
Start: 2018-10-12 | End: 2019-10-02 | Stop reason: SDUPTHER

## 2019-04-16 RX ORDER — SILDENAFIL 100 MG/1
4 TABLET, FILM COATED ORAL
COMMUNITY
Start: 2018-10-12

## 2019-04-16 RX ORDER — CLOPIDOGREL BISULFATE 75 MG/1
TABLET ORAL DAILY
COMMUNITY
Start: 2014-08-29

## 2019-04-16 RX ORDER — LOSARTAN POTASSIUM 50 MG/1
TABLET ORAL
COMMUNITY
Start: 2011-11-18

## 2019-04-16 RX ORDER — RANITIDINE 300 MG/1
TABLET ORAL
COMMUNITY
Start: 2014-12-10 | End: 2020-10-16

## 2019-04-16 RX ORDER — METOPROLOL SUCCINATE 25 MG/1
TABLET, EXTENDED RELEASE ORAL
COMMUNITY
Start: 2011-05-24

## 2019-05-23 ENCOUNTER — PATIENT OUTREACH (OUTPATIENT)
Dept: FAMILY MEDICINE CLINIC | Facility: CLINIC | Age: 74
End: 2019-05-23

## 2019-06-03 ENCOUNTER — TELEPHONE (OUTPATIENT)
Dept: FAMILY MEDICINE CLINIC | Facility: CLINIC | Age: 74
End: 2019-06-03

## 2019-06-03 NOTE — TELEPHONE ENCOUNTER
Patient will give us a call back before tomorrow to let us know if he is interested on the shingrix vaccine.

## 2019-07-23 ENCOUNTER — MA CHART PREP (OUTPATIENT)
Dept: FAMILY MEDICINE CLINIC | Facility: CLINIC | Age: 74
End: 2019-07-23

## 2019-07-23 PROBLEM — Z87.891 FORMER SMOKER: Status: ACTIVE | Noted: 2019-07-23

## 2019-08-01 ENCOUNTER — OFFICE VISIT (OUTPATIENT)
Dept: FAMILY MEDICINE CLINIC | Facility: CLINIC | Age: 74
End: 2019-08-01
Payer: MEDICARE

## 2019-08-01 ENCOUNTER — LAB ENCOUNTER (OUTPATIENT)
Dept: LAB | Age: 74
End: 2019-08-01
Attending: FAMILY MEDICINE
Payer: MEDICARE

## 2019-08-01 VITALS
OXYGEN SATURATION: 97 % | DIASTOLIC BLOOD PRESSURE: 78 MMHG | BODY MASS INDEX: 21.94 KG/M2 | SYSTOLIC BLOOD PRESSURE: 136 MMHG | HEART RATE: 54 BPM | RESPIRATION RATE: 16 BRPM | HEIGHT: 72 IN | WEIGHT: 162 LBS | TEMPERATURE: 98 F

## 2019-08-01 DIAGNOSIS — N40.2 PROSTATE NODULE: ICD-10-CM

## 2019-08-01 DIAGNOSIS — N40.1 BENIGN PROSTATIC HYPERPLASIA WITH WEAK URINARY STREAM: ICD-10-CM

## 2019-08-01 DIAGNOSIS — E53.8 LOW VITAMIN B12 LEVEL: ICD-10-CM

## 2019-08-01 DIAGNOSIS — N52.9 ED (ERECTILE DYSFUNCTION) OF ORGANIC ORIGIN: ICD-10-CM

## 2019-08-01 DIAGNOSIS — Z12.11 SCREENING FOR MALIGNANT NEOPLASM OF COLON: ICD-10-CM

## 2019-08-01 DIAGNOSIS — I10 ESSENTIAL HYPERTENSION: ICD-10-CM

## 2019-08-01 DIAGNOSIS — I77.9 CAROTID ARTERY DISEASE, UNSPECIFIED LATERALITY, UNSPECIFIED TYPE (HCC): ICD-10-CM

## 2019-08-01 DIAGNOSIS — E78.00 HYPERCHOLESTEROLEMIA: ICD-10-CM

## 2019-08-01 DIAGNOSIS — R39.12 BENIGN PROSTATIC HYPERPLASIA WITH WEAK URINARY STREAM: ICD-10-CM

## 2019-08-01 DIAGNOSIS — I77.819 AORTIC ECTASIA (HCC): ICD-10-CM

## 2019-08-01 DIAGNOSIS — I70.8 AORTO-ILIAC ATHEROSCLEROSIS (HCC): ICD-10-CM

## 2019-08-01 DIAGNOSIS — Z95.5 HX OF HEART ARTERY STENT: ICD-10-CM

## 2019-08-01 DIAGNOSIS — E55.9 HYPOVITAMINOSIS D: ICD-10-CM

## 2019-08-01 DIAGNOSIS — Z12.5 SCREENING FOR MALIGNANT NEOPLASM OF PROSTATE: ICD-10-CM

## 2019-08-01 DIAGNOSIS — I70.0 AORTO-ILIAC ATHEROSCLEROSIS (HCC): ICD-10-CM

## 2019-08-01 DIAGNOSIS — Z00.00 MEDICARE ANNUAL WELLNESS VISIT, SUBSEQUENT: Primary | ICD-10-CM

## 2019-08-01 PROBLEM — Z87.891 FORMER SMOKER: Status: RESOLVED | Noted: 2019-07-23 | Resolved: 2019-08-01

## 2019-08-01 LAB
ALBUMIN SERPL-MCNC: 4.1 G/DL (ref 3.4–5)
ALBUMIN/GLOB SERPL: 1.1 {RATIO} (ref 1–2)
ALP LIVER SERPL-CCNC: 54 U/L (ref 45–117)
ALT SERPL-CCNC: 22 U/L (ref 16–61)
ANION GAP SERPL CALC-SCNC: 6 MMOL/L (ref 0–18)
AST SERPL-CCNC: 20 U/L (ref 15–37)
BASOPHILS # BLD AUTO: 0.03 X10(3) UL (ref 0–0.2)
BASOPHILS NFR BLD AUTO: 0.8 %
BILIRUB SERPL-MCNC: 0.5 MG/DL (ref 0.1–2)
BUN BLD-MCNC: 23 MG/DL (ref 7–18)
BUN/CREAT SERPL: 18.9 (ref 10–20)
CALCIUM BLD-MCNC: 9.1 MG/DL (ref 8.5–10.1)
CHLORIDE SERPL-SCNC: 109 MMOL/L (ref 98–112)
CHOLEST SMN-MCNC: 127 MG/DL (ref ?–200)
CO2 SERPL-SCNC: 27 MMOL/L (ref 21–32)
COMPLEXED PSA SERPL-MCNC: 0.47 NG/ML (ref ?–4)
CREAT BLD-MCNC: 1.22 MG/DL (ref 0.7–1.3)
DEPRECATED RDW RBC AUTO: 45.4 FL (ref 35.1–46.3)
EOSINOPHIL # BLD AUTO: 0.21 X10(3) UL (ref 0–0.7)
EOSINOPHIL NFR BLD AUTO: 5.8 %
ERYTHROCYTE [DISTWIDTH] IN BLOOD BY AUTOMATED COUNT: 12 % (ref 11–15)
GLOBULIN PLAS-MCNC: 3.7 G/DL (ref 2.8–4.4)
GLUCOSE BLD-MCNC: 94 MG/DL (ref 70–99)
HCT VFR BLD AUTO: 41.9 % (ref 39–53)
HDLC SERPL-MCNC: 59 MG/DL (ref 40–59)
HGB BLD-MCNC: 13.8 G/DL (ref 13–17.5)
IMM GRANULOCYTES # BLD AUTO: 0.01 X10(3) UL (ref 0–1)
IMM GRANULOCYTES NFR BLD: 0.3 %
LDLC SERPL CALC-MCNC: 53 MG/DL (ref ?–100)
LYMPHOCYTES # BLD AUTO: 0.82 X10(3) UL (ref 1–4)
LYMPHOCYTES NFR BLD AUTO: 22.6 %
M PROTEIN MFR SERPL ELPH: 7.8 G/DL (ref 6.4–8.2)
MCH RBC QN AUTO: 33.3 PG (ref 26–34)
MCHC RBC AUTO-ENTMCNC: 32.9 G/DL (ref 31–37)
MCV RBC AUTO: 101.2 FL (ref 80–100)
MONOCYTES # BLD AUTO: 0.42 X10(3) UL (ref 0.1–1)
MONOCYTES NFR BLD AUTO: 11.6 %
NEUTROPHILS # BLD AUTO: 2.14 X10 (3) UL (ref 1.5–7.7)
NEUTROPHILS # BLD AUTO: 2.14 X10(3) UL (ref 1.5–7.7)
NEUTROPHILS NFR BLD AUTO: 58.9 %
NONHDLC SERPL-MCNC: 68 MG/DL (ref ?–130)
OSMOLALITY SERPL CALC.SUM OF ELEC: 297 MOSM/KG (ref 275–295)
PLATELET # BLD AUTO: 194 10(3)UL (ref 150–450)
POTASSIUM SERPL-SCNC: 4 MMOL/L (ref 3.5–5.1)
RBC # BLD AUTO: 4.14 X10(6)UL (ref 3.8–5.8)
SODIUM SERPL-SCNC: 142 MMOL/L (ref 136–145)
TRIGL SERPL-MCNC: 73 MG/DL (ref 30–149)
TSI SER-ACNC: 3.07 MIU/ML (ref 0.36–3.74)
VIT B12 SERPL-MCNC: 1359 PG/ML (ref 193–986)
VIT D+METAB SERPL-MCNC: 41.9 NG/ML (ref 30–100)
VLDLC SERPL CALC-MCNC: 15 MG/DL (ref 0–30)
WBC # BLD AUTO: 3.6 X10(3) UL (ref 4–11)

## 2019-08-01 PROCEDURE — 82607 VITAMIN B-12: CPT

## 2019-08-01 PROCEDURE — 99397 PER PM REEVAL EST PAT 65+ YR: CPT | Performed by: FAMILY MEDICINE

## 2019-08-01 PROCEDURE — 85025 COMPLETE CBC W/AUTO DIFF WBC: CPT

## 2019-08-01 PROCEDURE — 36415 COLL VENOUS BLD VENIPUNCTURE: CPT

## 2019-08-01 PROCEDURE — 80053 COMPREHEN METABOLIC PANEL: CPT

## 2019-08-01 PROCEDURE — 80061 LIPID PANEL: CPT

## 2019-08-01 PROCEDURE — 96160 PT-FOCUSED HLTH RISK ASSMT: CPT | Performed by: FAMILY MEDICINE

## 2019-08-01 PROCEDURE — G0439 PPPS, SUBSEQ VISIT: HCPCS | Performed by: FAMILY MEDICINE

## 2019-08-01 PROCEDURE — 82306 VITAMIN D 25 HYDROXY: CPT

## 2019-08-01 PROCEDURE — 84443 ASSAY THYROID STIM HORMONE: CPT

## 2019-08-01 RX ORDER — FINASTERIDE 5 MG/1
5 TABLET, FILM COATED ORAL DAILY
Qty: 90 TABLET | Refills: 3 | Status: SHIPPED | OUTPATIENT
Start: 2019-08-01 | End: 2019-08-19

## 2019-08-01 RX ORDER — RANITIDINE 300 MG/1
TABLET ORAL
Qty: 90 TABLET | Refills: 3 | Status: SHIPPED | OUTPATIENT
Start: 2019-08-01 | End: 2019-08-19

## 2019-08-01 RX ORDER — LOSARTAN POTASSIUM 50 MG/1
50 TABLET ORAL DAILY
Qty: 90 TABLET | Refills: 3 | Status: SHIPPED | OUTPATIENT
Start: 2019-08-01 | End: 2020-08-12

## 2019-08-01 RX ORDER — CLOPIDOGREL BISULFATE 75 MG/1
75 TABLET ORAL
Qty: 90 TABLET | Refills: 3 | Status: SHIPPED | OUTPATIENT
Start: 2019-08-01 | End: 2020-08-12

## 2019-08-01 RX ORDER — METOPROLOL SUCCINATE 25 MG/1
25 TABLET, EXTENDED RELEASE ORAL DAILY
Qty: 90 TABLET | Refills: 3 | Status: SHIPPED | OUTPATIENT
Start: 2019-08-01 | End: 2020-08-12

## 2019-08-01 RX ORDER — SILDENAFIL 100 MG/1
100 TABLET, FILM COATED ORAL AS NEEDED
Qty: 4 TABLET | Refills: 11 | Status: SHIPPED | OUTPATIENT
Start: 2019-08-01 | End: 2020-08-12

## 2019-08-01 RX ORDER — SIMVASTATIN 20 MG
TABLET ORAL
Qty: 90 TABLET | Refills: 3 | Status: SHIPPED | OUTPATIENT
Start: 2019-08-01 | End: 2020-08-12

## 2019-08-01 NOTE — PROGRESS NOTES
Patient presents for a super visit–Medicare. Please refer to the template. His chart was updated and verified.     Tuning fork exam was performed and was entirely normal.    Screening for depression cognitive impairment functional decline and nutritional BPH with obstruction mild the plan to monitor and continue finasteride remember to adjust the dose of the PSA because of finasteride use.     Overall plan will see him back in 6 months wished him a happy cruise thank you

## 2019-08-19 DIAGNOSIS — R39.12 BENIGN PROSTATIC HYPERPLASIA WITH WEAK URINARY STREAM: ICD-10-CM

## 2019-08-19 DIAGNOSIS — N40.1 BENIGN PROSTATIC HYPERPLASIA WITH WEAK URINARY STREAM: ICD-10-CM

## 2019-08-19 RX ORDER — RANITIDINE 300 MG/1
TABLET ORAL
Qty: 90 TABLET | Refills: 3 | Status: SHIPPED | OUTPATIENT
Start: 2019-08-19 | End: 2020-08-12 | Stop reason: ALTCHOICE

## 2019-08-19 RX ORDER — FINASTERIDE 5 MG/1
5 TABLET, FILM COATED ORAL DAILY
Qty: 90 TABLET | Refills: 3 | Status: SHIPPED | OUTPATIENT
Start: 2019-08-19 | End: 2020-08-12

## 2019-10-11 ENCOUNTER — OFFICE VISIT (OUTPATIENT)
Dept: CARDIOLOGY | Age: 74
End: 2019-10-11

## 2019-10-11 VITALS
SYSTOLIC BLOOD PRESSURE: 110 MMHG | DIASTOLIC BLOOD PRESSURE: 70 MMHG | WEIGHT: 162 LBS | BODY MASS INDEX: 21.94 KG/M2 | HEIGHT: 72 IN | HEART RATE: 60 BPM

## 2019-10-11 DIAGNOSIS — R09.89 CAROTID BRUIT, UNSPECIFIED LATERALITY: ICD-10-CM

## 2019-10-11 DIAGNOSIS — I25.10 CORONARY ARTERY DISEASE INVOLVING NATIVE HEART WITHOUT ANGINA PECTORIS, UNSPECIFIED VESSEL OR LESION TYPE: Primary | ICD-10-CM

## 2019-10-11 DIAGNOSIS — E78.00 HYPERCHOLESTEROLEMIA: ICD-10-CM

## 2019-10-11 PROCEDURE — 99214 OFFICE O/P EST MOD 30 MIN: CPT | Performed by: INTERNAL MEDICINE

## 2019-10-11 ASSESSMENT — PATIENT HEALTH QUESTIONNAIRE - PHQ9
SUM OF ALL RESPONSES TO PHQ9 QUESTIONS 1 AND 2: 0
1. LITTLE INTEREST OR PLEASURE IN DOING THINGS: NOT AT ALL
2. FEELING DOWN, DEPRESSED OR HOPELESS: NOT AT ALL
SUM OF ALL RESPONSES TO PHQ9 QUESTIONS 1 AND 2: 0

## 2019-10-11 ASSESSMENT — ENCOUNTER SYMPTOMS
SUSPICIOUS LESIONS: 0
HEMATOCHEZIA: 0
HEMOPTYSIS: 0
FEVER: 0
BRUISES/BLEEDS EASILY: 0
COUGH: 0
CHILLS: 0
ALLERGIC/IMMUNOLOGIC COMMENTS: NO NEW FOOD ALLERGIES
WEIGHT LOSS: 0
WEIGHT GAIN: 0

## 2019-10-21 ENCOUNTER — TELEPHONE (OUTPATIENT)
Dept: CARDIOLOGY | Age: 74
End: 2019-10-21

## 2019-10-21 DIAGNOSIS — R09.89 CAROTID BRUIT, UNSPECIFIED LATERALITY: Primary | ICD-10-CM

## 2019-10-30 ENCOUNTER — HOSPITAL ENCOUNTER (OUTPATIENT)
Dept: CARDIOLOGY CLINIC | Facility: HOSPITAL | Age: 74
Discharge: HOME | End: 2019-10-30
Attending: INTERNAL MEDICINE
Payer: MEDICARE

## 2019-10-30 DIAGNOSIS — R09.89 CAROTID BRUIT, UNSPECIFIED LATERALITY: ICD-10-CM

## 2019-10-30 PROCEDURE — 93880 EXTRACRANIAL BILAT STUDY: CPT | Performed by: INTERNAL MEDICINE

## 2019-10-31 ENCOUNTER — TELEPHONE (OUTPATIENT)
Dept: CARDIOLOGY | Age: 74
End: 2019-10-31

## 2019-10-31 DIAGNOSIS — R09.89 CAROTID BRUIT, UNSPECIFIED LATERALITY: ICD-10-CM

## 2020-03-25 ENCOUNTER — TELEPHONE (OUTPATIENT)
Dept: FAMILY MEDICINE CLINIC | Facility: CLINIC | Age: 75
End: 2020-03-25

## 2020-03-25 RX ORDER — LOSARTAN POTASSIUM 100 MG/1
50 TABLET ORAL DAILY
Qty: 45 TABLET | Refills: 1 | Status: SHIPPED | OUTPATIENT
Start: 2020-03-25 | End: 2020-08-12 | Stop reason: DRUGHIGH

## 2020-03-25 NOTE — TELEPHONE ENCOUNTER
Patient is requesting refill of Losartan 50mg. He called his pharmacy and they only have the Losartan 100mg. Can script be changed to 100mg and he cut in half. [facility] [address] [place] [address] AT [address] [facility], [phone], [phone]    Please advise.

## 2020-08-12 ENCOUNTER — OFFICE VISIT (OUTPATIENT)
Dept: FAMILY MEDICINE CLINIC | Facility: CLINIC | Age: 75
End: 2020-08-12
Payer: MEDICARE

## 2020-08-12 ENCOUNTER — TELEPHONE (OUTPATIENT)
Dept: FAMILY MEDICINE CLINIC | Facility: CLINIC | Age: 75
End: 2020-08-12

## 2020-08-12 VITALS
DIASTOLIC BLOOD PRESSURE: 82 MMHG | SYSTOLIC BLOOD PRESSURE: 130 MMHG | HEIGHT: 71 IN | BODY MASS INDEX: 22.96 KG/M2 | WEIGHT: 164 LBS | RESPIRATION RATE: 16 BRPM | OXYGEN SATURATION: 98 % | HEART RATE: 68 BPM

## 2020-08-12 DIAGNOSIS — N52.9 ED (ERECTILE DYSFUNCTION) OF ORGANIC ORIGIN: ICD-10-CM

## 2020-08-12 DIAGNOSIS — I77.819 AORTIC ECTASIA (HCC): ICD-10-CM

## 2020-08-12 DIAGNOSIS — R39.12 BENIGN PROSTATIC HYPERPLASIA WITH WEAK URINARY STREAM: ICD-10-CM

## 2020-08-12 DIAGNOSIS — I70.0 AORTO-ILIAC ATHEROSCLEROSIS (HCC): ICD-10-CM

## 2020-08-12 DIAGNOSIS — I70.8 AORTO-ILIAC ATHEROSCLEROSIS (HCC): ICD-10-CM

## 2020-08-12 DIAGNOSIS — I10 ESSENTIAL HYPERTENSION: ICD-10-CM

## 2020-08-12 DIAGNOSIS — I77.9 CAROTID ARTERY DISEASE, UNSPECIFIED LATERALITY, UNSPECIFIED TYPE (HCC): ICD-10-CM

## 2020-08-12 DIAGNOSIS — E78.00 HYPERCHOLESTEROLEMIA: ICD-10-CM

## 2020-08-12 DIAGNOSIS — N40.1 BENIGN PROSTATIC HYPERPLASIA WITH WEAK URINARY STREAM: ICD-10-CM

## 2020-08-12 DIAGNOSIS — E55.9 HYPOVITAMINOSIS D: ICD-10-CM

## 2020-08-12 DIAGNOSIS — Z12.5 ENCOUNTER FOR SCREENING FOR MALIGNANT NEOPLASM OF PROSTATE: ICD-10-CM

## 2020-08-12 DIAGNOSIS — Z95.5 HX OF HEART ARTERY STENT: ICD-10-CM

## 2020-08-12 DIAGNOSIS — Z00.00 MEDICARE ANNUAL WELLNESS VISIT, SUBSEQUENT: Primary | ICD-10-CM

## 2020-08-12 PROCEDURE — 96160 PT-FOCUSED HLTH RISK ASSMT: CPT | Performed by: FAMILY MEDICINE

## 2020-08-12 PROCEDURE — 3079F DIAST BP 80-89 MM HG: CPT | Performed by: FAMILY MEDICINE

## 2020-08-12 PROCEDURE — G0439 PPPS, SUBSEQ VISIT: HCPCS | Performed by: FAMILY MEDICINE

## 2020-08-12 PROCEDURE — 3075F SYST BP GE 130 - 139MM HG: CPT | Performed by: FAMILY MEDICINE

## 2020-08-12 PROCEDURE — 3008F BODY MASS INDEX DOCD: CPT | Performed by: FAMILY MEDICINE

## 2020-08-12 PROCEDURE — 99397 PER PM REEVAL EST PAT 65+ YR: CPT | Performed by: FAMILY MEDICINE

## 2020-08-12 RX ORDER — SIMVASTATIN 20 MG
TABLET ORAL
Qty: 90 TABLET | Refills: 3 | Status: SHIPPED | OUTPATIENT
Start: 2020-08-12 | End: 2021-08-04

## 2020-08-12 RX ORDER — LOSARTAN POTASSIUM 50 MG/1
50 TABLET ORAL DAILY
Qty: 90 TABLET | Refills: 3 | Status: SHIPPED | OUTPATIENT
Start: 2020-08-12 | End: 2021-08-04

## 2020-08-12 RX ORDER — SILDENAFIL 100 MG/1
100 TABLET, FILM COATED ORAL AS NEEDED
Qty: 4 TABLET | Refills: 11 | Status: SHIPPED | OUTPATIENT
Start: 2020-08-12 | End: 2021-08-04

## 2020-08-12 RX ORDER — METOPROLOL SUCCINATE 25 MG/1
25 TABLET, EXTENDED RELEASE ORAL DAILY
Qty: 90 TABLET | Refills: 3 | Status: SHIPPED | OUTPATIENT
Start: 2020-08-12 | End: 2021-08-04

## 2020-08-12 RX ORDER — CLOPIDOGREL BISULFATE 75 MG/1
75 TABLET ORAL
Qty: 90 TABLET | Refills: 3 | Status: SHIPPED | OUTPATIENT
Start: 2020-08-12 | End: 2021-08-04

## 2020-08-12 RX ORDER — FINASTERIDE 5 MG/1
5 TABLET, FILM COATED ORAL DAILY
Qty: 90 TABLET | Refills: 3 | Status: SHIPPED | OUTPATIENT
Start: 2020-08-12 | End: 2021-08-04

## 2020-08-12 NOTE — PROGRESS NOTES
Presents for Medicare super visit. Generally doing quite well still in a successful relationship with a woman. Please refer to the template form.   Tuning fork analysis was completely normal.  Screening for cognitive impairment functional decline depres

## 2020-08-12 NOTE — PROGRESS NOTES
Derek Kelly REASON FOR VISIT:    Abiel Campos is a 66year old male who presents for a MA Supervisit.          Patient Care Team: Patient Care Team:  Alex Gooden DO as PCP - Rachid Chavira MD as Consulting Physician ([address] [facility])    Patient 1. 13 1.08 1.01 1.04 1.05   Some recent data might be hidden     AST and ALT Latest Ref Rng & Units 8/1/2019 2/28/2019 9/11/2018 8/28/2017 7/26/2016 7/2/2015 8/5/2014   AST 15 - 37 U/L 20 26 24 18 22 - -   AST (SGOT) 10 - 35 U/L - - - - - 22 19   ALT 16 - 6 seeing?: (P) 1-Yes  Do you have any difficulty walking or getting up?: (P) 0-No  Do you have any tripping hazards?: (P) 0-No  Are you on multiple medications?: (P) 1-Yes  Does pain affect your day to day activities?: (P) 0-No  Have you had any memory issue Annually Potassium (mmol/L)   Date Value   08/01/2019 4.0     POTASSIUM (mmol/L)   Date Value   07/02/2015 4.4       Creatinine  Annually CREATININE (mg/dL)   Date Value   07/02/2015 1.04     Creatinine (mg/dL)   Date Value   08/01/2019 1.22       Digoxin Smoking status: Former Smoker        Quit date: 2011        Years since quittin.2      Smokeless tobacco: Never Used    Alcohol use: No    Drug use: No           EXAM:   /82   Pulse 68   Resp 16   Ht 71\"   Wt 164 lb (74.4 kg)   SpO2 98%   B total) by mouth as needed for Erectile Dysfunction.     Carotid artery disease, unspecified laterality, unspecified type ([address]  -     CARDIO - INTERNAL    Aorto-iliac atherosclerosis ([address]  -     CARDIO - INTERNAL    Aortic ectasia ([address]  -     CARDIO - INT

## 2020-08-14 ENCOUNTER — TELEPHONE (OUTPATIENT)
Dept: FAMILY MEDICINE CLINIC | Facility: CLINIC | Age: 75
End: 2020-08-14

## 2020-08-14 ENCOUNTER — NURSE ONLY (OUTPATIENT)
Dept: FAMILY MEDICINE CLINIC | Facility: CLINIC | Age: 75
End: 2020-08-14
Payer: MEDICARE

## 2020-08-14 PROCEDURE — 90750 HZV VACC RECOMBINANT IM: CPT | Performed by: FAMILY MEDICINE

## 2020-08-14 PROCEDURE — 90471 IMMUNIZATION ADMIN: CPT | Performed by: FAMILY MEDICINE

## 2020-08-27 ENCOUNTER — LAB ENCOUNTER (OUTPATIENT)
Dept: LAB | Age: 75
End: 2020-08-27
Attending: FAMILY MEDICINE
Payer: MEDICARE

## 2020-08-27 DIAGNOSIS — I10 ESSENTIAL HYPERTENSION: ICD-10-CM

## 2020-08-27 DIAGNOSIS — Z12.5 ENCOUNTER FOR SCREENING FOR MALIGNANT NEOPLASM OF PROSTATE: ICD-10-CM

## 2020-08-27 DIAGNOSIS — E78.00 HYPERCHOLESTEROLEMIA: ICD-10-CM

## 2020-08-27 DIAGNOSIS — E55.9 HYPOVITAMINOSIS D: ICD-10-CM

## 2020-08-27 LAB
ALBUMIN SERPL-MCNC: 3.7 G/DL (ref 3.4–5)
ALBUMIN/GLOB SERPL: 1.1 {RATIO} (ref 1–2)
ALP LIVER SERPL-CCNC: 54 U/L (ref 45–117)
ALT SERPL-CCNC: 28 U/L (ref 16–61)
ANION GAP SERPL CALC-SCNC: 2 MMOL/L (ref 0–18)
AST SERPL-CCNC: 25 U/L (ref 15–37)
BASOPHILS # BLD AUTO: 0.05 X10(3) UL (ref 0–0.2)
BASOPHILS NFR BLD AUTO: 1.1 %
BILIRUB SERPL-MCNC: 0.4 MG/DL (ref 0.1–2)
BUN BLD-MCNC: 27 MG/DL (ref 7–18)
BUN/CREAT SERPL: 22.7 (ref 10–20)
CALCIUM BLD-MCNC: 9.3 MG/DL (ref 8.5–10.1)
CHLORIDE SERPL-SCNC: 107 MMOL/L (ref 98–112)
CHOLEST SMN-MCNC: 128 MG/DL (ref ?–200)
CO2 SERPL-SCNC: 29 MMOL/L (ref 21–32)
COMPLEXED PSA SERPL-MCNC: 0.43 NG/ML (ref ?–4)
CREAT BLD-MCNC: 1.19 MG/DL (ref 0.7–1.3)
DEPRECATED RDW RBC AUTO: 46.7 FL (ref 35.1–46.3)
EOSINOPHIL # BLD AUTO: 0.47 X10(3) UL (ref 0–0.7)
EOSINOPHIL NFR BLD AUTO: 10.4 %
ERYTHROCYTE [DISTWIDTH] IN BLOOD BY AUTOMATED COUNT: 11.9 % (ref 11–15)
GLOBULIN PLAS-MCNC: 3.5 G/DL (ref 2.8–4.4)
GLUCOSE BLD-MCNC: 88 MG/DL (ref 70–99)
HCT VFR BLD AUTO: 41.8 % (ref 39–53)
HDLC SERPL-MCNC: 60 MG/DL (ref 40–59)
HGB BLD-MCNC: 13.1 G/DL (ref 13–17.5)
IMM GRANULOCYTES # BLD AUTO: 0.01 X10(3) UL (ref 0–1)
IMM GRANULOCYTES NFR BLD: 0.2 %
LDLC SERPL CALC-MCNC: 55 MG/DL (ref ?–100)
LYMPHOCYTES # BLD AUTO: 0.95 X10(3) UL (ref 1–4)
LYMPHOCYTES NFR BLD AUTO: 20.9 %
M PROTEIN MFR SERPL ELPH: 7.2 G/DL (ref 6.4–8.2)
MCH RBC QN AUTO: 33.8 PG (ref 26–34)
MCHC RBC AUTO-ENTMCNC: 31.3 G/DL (ref 31–37)
MCV RBC AUTO: 107.7 FL (ref 80–100)
MONOCYTES # BLD AUTO: 0.56 X10(3) UL (ref 0.1–1)
MONOCYTES NFR BLD AUTO: 12.3 %
NEUTROPHILS # BLD AUTO: 2.5 X10 (3) UL (ref 1.5–7.7)
NEUTROPHILS # BLD AUTO: 2.5 X10(3) UL (ref 1.5–7.7)
NEUTROPHILS NFR BLD AUTO: 55.1 %
NONHDLC SERPL-MCNC: 68 MG/DL (ref ?–130)
OSMOLALITY SERPL CALC.SUM OF ELEC: 291 MOSM/KG (ref 275–295)
PATIENT FASTING Y/N/NP: YES
PATIENT FASTING Y/N/NP: YES
PLATELET # BLD AUTO: 209 10(3)UL (ref 150–450)
POTASSIUM SERPL-SCNC: 4.4 MMOL/L (ref 3.5–5.1)
RBC # BLD AUTO: 3.88 X10(6)UL (ref 3.8–5.8)
SODIUM SERPL-SCNC: 138 MMOL/L (ref 136–145)
TRIGL SERPL-MCNC: 65 MG/DL (ref 30–149)
TSI SER-ACNC: 3.06 MIU/ML (ref 0.36–3.74)
VIT D+METAB SERPL-MCNC: 39.2 NG/ML (ref 30–100)
VLDLC SERPL CALC-MCNC: 13 MG/DL (ref 0–30)
WBC # BLD AUTO: 4.5 X10(3) UL (ref 4–11)

## 2020-08-27 PROCEDURE — 84443 ASSAY THYROID STIM HORMONE: CPT

## 2020-08-27 PROCEDURE — 82306 VITAMIN D 25 HYDROXY: CPT

## 2020-08-27 PROCEDURE — 80061 LIPID PANEL: CPT

## 2020-08-27 PROCEDURE — 85025 COMPLETE CBC W/AUTO DIFF WBC: CPT

## 2020-08-27 PROCEDURE — 36415 COLL VENOUS BLD VENIPUNCTURE: CPT

## 2020-08-27 PROCEDURE — 80053 COMPREHEN METABOLIC PANEL: CPT

## 2020-08-31 ENCOUNTER — OFFICE VISIT (OUTPATIENT)
Dept: FAMILY MEDICINE CLINIC | Facility: CLINIC | Age: 75
End: 2020-08-31
Payer: MEDICARE

## 2020-08-31 VITALS
TEMPERATURE: 98 F | HEART RATE: 51 BPM | DIASTOLIC BLOOD PRESSURE: 76 MMHG | HEIGHT: 71 IN | BODY MASS INDEX: 23.24 KG/M2 | OXYGEN SATURATION: 97 % | SYSTOLIC BLOOD PRESSURE: 118 MMHG | WEIGHT: 166 LBS | RESPIRATION RATE: 20 BRPM

## 2020-08-31 DIAGNOSIS — D75.89 MACROCYTOSIS: Primary | ICD-10-CM

## 2020-08-31 PROCEDURE — 3008F BODY MASS INDEX DOCD: CPT | Performed by: FAMILY MEDICINE

## 2020-08-31 PROCEDURE — 3074F SYST BP LT 130 MM HG: CPT | Performed by: FAMILY MEDICINE

## 2020-08-31 PROCEDURE — 99213 OFFICE O/P EST LOW 20 MIN: CPT | Performed by: FAMILY MEDICINE

## 2020-08-31 PROCEDURE — 3078F DIAST BP <80 MM HG: CPT | Performed by: FAMILY MEDICINE

## 2020-08-31 NOTE — PROGRESS NOTES
Patient is here on my suggestion to discuss a persistent elevated macrocytosis of his red blood cells. He enjoys occasional low volume alcohol with his girlfriend. He denies symptoms such as paresthes.     He is alert aware appropriate no tremors no rigid

## 2020-09-17 ENCOUNTER — OFFICE VISIT (OUTPATIENT)
Dept: HEMATOLOGY/ONCOLOGY | Facility: HOSPITAL | Age: 75
End: 2020-09-17
Attending: INTERNAL MEDICINE
Payer: MEDICARE

## 2020-09-17 VITALS
OXYGEN SATURATION: 97 % | HEIGHT: 71 IN | RESPIRATION RATE: 16 BRPM | TEMPERATURE: 96 F | DIASTOLIC BLOOD PRESSURE: 64 MMHG | BODY MASS INDEX: 23.27 KG/M2 | WEIGHT: 166.19 LBS | SYSTOLIC BLOOD PRESSURE: 118 MMHG | HEART RATE: 71 BPM

## 2020-09-17 DIAGNOSIS — F10.10 ALCOHOL ABUSE: ICD-10-CM

## 2020-09-17 DIAGNOSIS — D75.89 MACROCYTOSIS: Primary | ICD-10-CM

## 2020-09-17 DIAGNOSIS — D72.810 LYMPHOCYTOPENIA: ICD-10-CM

## 2020-09-17 LAB
FOLATE SERPL-MCNC: 24.4 NG/ML (ref 8.7–?)
LDH SERPL L TO P-CCNC: 171 U/L

## 2020-09-17 PROCEDURE — 99204 OFFICE O/P NEW MOD 45 MIN: CPT | Performed by: INTERNAL MEDICINE

## 2020-09-17 NOTE — CONSULTS
[facility] Report of Consultation    Patient Name: Paulie Nunez   YOB: 1945   Medical Record Number: [id number]   CSN: [id number]   Consulting Physician: Tre Avalos MD  Referring Physician(s): No ref.  provider found  Date of Consul History    Socioeconomic History      Marital status:        Spouse name: Not on file      Number of children: Not on file      Years of education: Not on file      Highest education level: Not on file    Occupational History      Not on file    Soci Asked    Social History Narrative      Not on file      Allergies:   No Known Allergies    Current Medications:    Current Outpatient Medications:   •  finasteride 5 MG Oral Tab, Take 1 tablet (5 mg total) by mouth daily. , Disp: 90 tablet, Rfl: 3  •  Clopi (09/17 8855)  Weight: 75.4 kg (166 lb 3.2 oz) (09/17 0849)  BSA (Calculated - sq m): 1.95 sq meters (09/17 0849)  Pulse: 71 (09/17 0849)  BP: 118/64 (09/17 0849)  Temp: 96.3 °F (35.7 °C) (09/17 0849)  Do Not Use - Resp Rate: --  SpO2: 97 % (09/17 0849) limiting its evaluation. The spleen measures 9.8 cm  increased bipolar dimension. KIDNEYS: Normal in echogenicity. Right kidney measures 10.2 cm. Left kidney measures 9.9 cm. Moderate right hydronephrosis noted.   AORTA/IVC: Atherosclerotic ectatic dista reflect  cysts that have been complicated by previous hemorrhage or infection. 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  He has had heavy chronic alcohol intake in t

## 2020-09-17 NOTE — PROGRESS NOTES
Pt feeling well.    Referred by PCP   Education Record    Learner:  Patient    Disease / Diagnosis:    Barriers / Limitations:  None   Comments:    Method:  Discussion   Comments:    General Topics:  Plan of care reviewed   Comments:    Outcome:  Shows unde

## 2020-10-14 ENCOUNTER — NURSE ONLY (OUTPATIENT)
Dept: FAMILY MEDICINE CLINIC | Facility: CLINIC | Age: 75
End: 2020-10-14
Payer: MEDICARE

## 2020-10-14 PROCEDURE — 90471 IMMUNIZATION ADMIN: CPT | Performed by: FAMILY MEDICINE

## 2020-10-14 PROCEDURE — 90750 HZV VACC RECOMBINANT IM: CPT | Performed by: FAMILY MEDICINE

## 2020-10-16 ENCOUNTER — OFFICE VISIT (OUTPATIENT)
Dept: CARDIOLOGY | Age: 75
End: 2020-10-16

## 2020-10-16 VITALS
DIASTOLIC BLOOD PRESSURE: 62 MMHG | BODY MASS INDEX: 22.26 KG/M2 | WEIGHT: 168 LBS | HEART RATE: 64 BPM | HEIGHT: 73 IN | SYSTOLIC BLOOD PRESSURE: 114 MMHG

## 2020-10-16 DIAGNOSIS — I25.10 CORONARY ARTERY DISEASE INVOLVING NATIVE HEART WITHOUT ANGINA PECTORIS, UNSPECIFIED VESSEL OR LESION TYPE: Primary | ICD-10-CM

## 2020-10-16 DIAGNOSIS — E78.00 HYPERCHOLESTEROLEMIA: ICD-10-CM

## 2020-10-16 DIAGNOSIS — R09.89 CAROTID BRUIT, UNSPECIFIED LATERALITY: ICD-10-CM

## 2020-10-16 PROCEDURE — 99214 OFFICE O/P EST MOD 30 MIN: CPT | Performed by: INTERNAL MEDICINE

## 2020-10-16 SDOH — HEALTH STABILITY: PHYSICAL HEALTH: ON AVERAGE, HOW MANY DAYS PER WEEK DO YOU ENGAGE IN MODERATE TO STRENUOUS EXERCISE (LIKE A BRISK WALK)?: 7 DAYS

## 2020-10-16 SDOH — HEALTH STABILITY: PHYSICAL HEALTH: ON AVERAGE, HOW MANY MINUTES DO YOU ENGAGE IN EXERCISE AT THIS LEVEL?: 30 MIN

## 2020-10-16 ASSESSMENT — ENCOUNTER SYMPTOMS
HEMOPTYSIS: 0
COUGH: 0
BRUISES/BLEEDS EASILY: 0
ALLERGIC/IMMUNOLOGIC COMMENTS: NO NEW FOOD ALLERGIES
WEIGHT LOSS: 0
HEMATOCHEZIA: 0
SUSPICIOUS LESIONS: 0
WEIGHT GAIN: 0
FEVER: 0
CHILLS: 0

## 2020-10-16 ASSESSMENT — PATIENT HEALTH QUESTIONNAIRE - PHQ9
2. FEELING DOWN, DEPRESSED OR HOPELESS: NOT AT ALL
CLINICAL INTERPRETATION OF PHQ9 SCORE: NO FURTHER SCREENING NEEDED
SUM OF ALL RESPONSES TO PHQ9 QUESTIONS 1 AND 2: 0
SUM OF ALL RESPONSES TO PHQ9 QUESTIONS 1 AND 2: 0
CLINICAL INTERPRETATION OF PHQ2 SCORE: NO FURTHER SCREENING NEEDED
1. LITTLE INTEREST OR PLEASURE IN DOING THINGS: NOT AT ALL

## 2021-03-25 ENCOUNTER — TELEPHONE (OUTPATIENT)
Dept: CASE MANAGEMENT | Age: 76
End: 2021-03-25

## 2021-03-25 NOTE — TELEPHONE ENCOUNTER
Patient informed is eligible for 2021 Medicare Advantage Supervisit, states will call back to schedule.

## 2021-08-04 ENCOUNTER — OFFICE VISIT (OUTPATIENT)
Dept: FAMILY MEDICINE CLINIC | Facility: CLINIC | Age: 76
End: 2021-08-04
Payer: MEDICARE

## 2021-08-04 VITALS
RESPIRATION RATE: 18 BRPM | DIASTOLIC BLOOD PRESSURE: 68 MMHG | SYSTOLIC BLOOD PRESSURE: 122 MMHG | WEIGHT: 163 LBS | OXYGEN SATURATION: 97 % | BODY MASS INDEX: 22.08 KG/M2 | HEART RATE: 62 BPM | HEIGHT: 72 IN

## 2021-08-04 DIAGNOSIS — I70.0 AORTO-ILIAC ATHEROSCLEROSIS (HCC): ICD-10-CM

## 2021-08-04 DIAGNOSIS — Z95.5 HX OF HEART ARTERY STENT: ICD-10-CM

## 2021-08-04 DIAGNOSIS — I70.8 AORTO-ILIAC ATHEROSCLEROSIS (HCC): ICD-10-CM

## 2021-08-04 DIAGNOSIS — E78.00 HYPERCHOLESTEROLEMIA: ICD-10-CM

## 2021-08-04 DIAGNOSIS — S29.012S STRAIN OF LATISSIMUS DORSI MUSCLE, SEQUELA: ICD-10-CM

## 2021-08-04 DIAGNOSIS — Z00.00 ENCOUNTER FOR ANNUAL HEALTH EXAMINATION: ICD-10-CM

## 2021-08-04 DIAGNOSIS — R39.12 BENIGN PROSTATIC HYPERPLASIA WITH WEAK URINARY STREAM: ICD-10-CM

## 2021-08-04 DIAGNOSIS — I10 ESSENTIAL HYPERTENSION: ICD-10-CM

## 2021-08-04 DIAGNOSIS — H25.9 AGE-RELATED CATARACT OF BOTH EYES, UNSPECIFIED AGE-RELATED CATARACT TYPE: Primary | ICD-10-CM

## 2021-08-04 DIAGNOSIS — N52.9 ED (ERECTILE DYSFUNCTION) OF ORGANIC ORIGIN: ICD-10-CM

## 2021-08-04 DIAGNOSIS — I77.9 CAROTID ARTERY DISEASE, UNSPECIFIED LATERALITY, UNSPECIFIED TYPE (HCC): ICD-10-CM

## 2021-08-04 DIAGNOSIS — N40.1 BENIGN PROSTATIC HYPERPLASIA WITH WEAK URINARY STREAM: ICD-10-CM

## 2021-08-04 DIAGNOSIS — I77.819 AORTIC ECTASIA (HCC): ICD-10-CM

## 2021-08-04 PROBLEM — R01.1 HEART MURMUR, SYSTOLIC: Status: ACTIVE | Noted: 2021-08-04

## 2021-08-04 PROCEDURE — 3074F SYST BP LT 130 MM HG: CPT | Performed by: FAMILY MEDICINE

## 2021-08-04 PROCEDURE — 96160 PT-FOCUSED HLTH RISK ASSMT: CPT | Performed by: FAMILY MEDICINE

## 2021-08-04 PROCEDURE — 3078F DIAST BP <80 MM HG: CPT | Performed by: FAMILY MEDICINE

## 2021-08-04 PROCEDURE — G0439 PPPS, SUBSEQ VISIT: HCPCS | Performed by: FAMILY MEDICINE

## 2021-08-04 PROCEDURE — 99397 PER PM REEVAL EST PAT 65+ YR: CPT | Performed by: FAMILY MEDICINE

## 2021-08-04 PROCEDURE — 3008F BODY MASS INDEX DOCD: CPT | Performed by: FAMILY MEDICINE

## 2021-08-04 RX ORDER — FINASTERIDE 5 MG/1
5 TABLET, FILM COATED ORAL DAILY
Qty: 90 TABLET | Refills: 3 | Status: SHIPPED | OUTPATIENT
Start: 2021-08-04 | End: 2022-07-30

## 2021-08-04 RX ORDER — TAMSULOSIN HYDROCHLORIDE 0.4 MG/1
0.4 CAPSULE ORAL DAILY
Qty: 90 CAPSULE | Refills: 3 | Status: SHIPPED | OUTPATIENT
Start: 2021-08-04 | End: 2021-12-03 | Stop reason: SINTOL

## 2021-08-04 RX ORDER — LOSARTAN POTASSIUM 50 MG/1
50 TABLET ORAL DAILY
Qty: 90 TABLET | Refills: 3 | Status: SHIPPED | OUTPATIENT
Start: 2021-08-04 | End: 2022-01-05

## 2021-08-04 RX ORDER — SILDENAFIL 100 MG/1
100 TABLET, FILM COATED ORAL AS NEEDED
Qty: 4 TABLET | Refills: 11 | Status: SHIPPED | OUTPATIENT
Start: 2021-08-04

## 2021-08-04 RX ORDER — CLOPIDOGREL BISULFATE 75 MG/1
75 TABLET ORAL
Qty: 90 TABLET | Refills: 3 | Status: SHIPPED | OUTPATIENT
Start: 2021-08-04

## 2021-08-04 RX ORDER — SIMVASTATIN 20 MG
TABLET ORAL
Qty: 90 TABLET | Refills: 3 | Status: SHIPPED | OUTPATIENT
Start: 2021-08-04

## 2021-08-04 RX ORDER — METOPROLOL SUCCINATE 25 MG/1
25 TABLET, EXTENDED RELEASE ORAL DAILY
Qty: 90 TABLET | Refills: 3 | Status: SHIPPED | OUTPATIENT
Start: 2021-08-04

## 2021-08-04 NOTE — PROGRESS NOTES
HPI:   Jared Gifford is a 64year old male who presents for a MA (Medicare Advantage) [facility] (Once per calendar year). Patient has been diagnosed with cataracts. Needs a referral to [facility] Dr. Shawanda Quezada.   Patient sees Dr. Dougie Ocampo fo file in [facility] [address] The patient has this document but we do not have it in [facility], and patient is instructed to get our office a copy of it for scanning into Epic. He smoked tobacco in the past but quit greater than 12 months ago.   Social History    Tobacc daily.  metoprolol succinate 25 MG Oral Tablet 24 Hr, Take 1 tablet (25 mg total) by mouth daily. losartan Potassium 50 MG Oral Tab, Take 1 tablet (50 mg total) by mouth daily.   simvastatin 20 MG Oral Tab, TAKE 1 TABLET BY MOUTH EVERY NIGHT AT BEDTIME  Si 20/200   Left Eye Visual Acuity: Uncorrected Left Eye Chart Acuity: 20/50   Both Eyes Visual Acuity: Uncorrected Both Eyes Chart Acuity: 20/50   Able To Tolerate Visual Acuity: No      General Appearance:  Alert, cooperative, no distress, appears stated ag 09/12/2018, 09/03/2019   • Fluvirin, 3 Years & >, Im 10/28/2014   • Fluzone Vaccine Medicare () 09/07/2016, 09/27/2017   • Influenza 01/01/2006, 01/01/2007, 10/04/2011, 10/16/2012, 09/07/2016, 09/03/2020   • Pneumococcal (Prevnar 13) 07/25/2016   • Pn Sildenafil Citrate (VIAGRA) 100 MG Oral Tab; Take 1 tablet (100 mg total) by mouth as needed for Erectile Dysfunction. Hx of heart artery stent  -     clopidogrel 75 MG Oral Tab;  Take 1 tablet (75 mg total) by mouth once daily.  -     COMP METABOLIC Cardiovascular Disease Screening    Lipid Panel  Cholesterol  Lipoprotein (HDL)  Triglycerides Covered every 5 years for all Medicare beneficiaries without apparent signs or symptoms of cardiovascular disease Lab Results   Component Value Date    CHOLEST covered with your pharmacy  prescription benefits 01/01/2008  No recommendations at this time        Annual Monitoring of Persistent Medications (ACE/ARB, digoxin diuretics, anticonvulsants)    Potassium Annually Lab Results   Component Value Date    K 4.4

## 2021-08-04 NOTE — PATIENT INSTRUCTIONS
Yuly Johnston's SCREENING SCHEDULE   Tests on this list are recommended by your physician but may not be covered, or covered at this frequency, by your insurer. Please check with your insurance carrier before scheduling to verify coverage.    PREVEN Pneumococcal Each vaccine ([id number] & Hldhjcyjd58) covered once after 65 Prevnar 13: 07/25/2016    Xeotdhxzw13: 01/01/2006     Pneumococcal Vaccination(2 of 2 - PPSV23) due on 07/25/2017    Hepatitis B One screening covered for patients with certain risk

## 2021-09-07 ENCOUNTER — TELEPHONE (OUTPATIENT)
Dept: PHYSICAL THERAPY | Facility: HOSPITAL | Age: 76
End: 2021-09-07

## 2021-09-08 ENCOUNTER — OFFICE VISIT (OUTPATIENT)
Dept: PHYSICAL THERAPY | Facility: HOSPITAL | Age: 76
End: 2021-09-08
Attending: FAMILY MEDICINE
Payer: MEDICARE

## 2021-09-08 DIAGNOSIS — S29.012S STRAIN OF LATISSIMUS DORSI MUSCLE, SEQUELA: ICD-10-CM

## 2021-09-08 PROCEDURE — 97162 PT EVAL MOD COMPLEX 30 MIN: CPT

## 2021-09-08 PROCEDURE — 97140 MANUAL THERAPY 1/> REGIONS: CPT

## 2021-09-08 NOTE — PROGRESS NOTES
SPINE EVALUATION:   Referring Physician: Dr. 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 Pt has dizziness due to prescription.      ASSESSMENT  Dev villa Special tests:   SLR R 45, L 40 (-) for reproduction of back pain    Gait: pt ambulates on level ground with normal mechanics.     Today’s Treatment and Response:   Pt education was provided on exam findings, treatment diagnosis, treatment plan, expecta Management, Therapeutic Activities, Therapeutic Exercise and Home Exercise Program instruction    Education or treatment limitation: None  Rehab Potential:good    FOTO: 70/100    Patient/Family/Caregiver was advised of these findings, precautions, and tal

## 2021-09-13 ENCOUNTER — OFFICE VISIT (OUTPATIENT)
Dept: PHYSICAL THERAPY | Facility: HOSPITAL | Age: 76
End: 2021-09-13
Attending: FAMILY MEDICINE
Payer: MEDICARE

## 2021-09-13 DIAGNOSIS — S29.012S STRAIN OF LATISSIMUS DORSI MUSCLE, SEQUELA: ICD-10-CM

## 2021-09-13 PROCEDURE — 97110 THERAPEUTIC EXERCISES: CPT

## 2021-09-13 PROCEDURE — 97140 MANUAL THERAPY 1/> REGIONS: CPT

## 2021-09-13 NOTE — PROGRESS NOTES
Dx: Strain of latissimus dorsi muscle, sequela (S29.012S)         Insurance (Authorized # of Visits):  10 POC            Authorizing Physician: Dr. Shruthi Chandler  Next MD visit: none scheduled  Fall Risk: standard         Precautions: n/a             Subjective: X       X       HEP:  Access Code: [email or web address]  Exercises  Sidelying Thoracic Rotation with Open Book - 1 x daily - 7 x weekly - 1 sets - 10 reps  Seated Thoracic Lumbar Extension - 1 x daily - 7 x weekly - 1 sets - 5-10 reps    Charges: man there x1, ther

## 2021-09-17 ENCOUNTER — OFFICE VISIT (OUTPATIENT)
Dept: PHYSICAL THERAPY | Facility: HOSPITAL | Age: 76
End: 2021-09-17
Attending: FAMILY MEDICINE
Payer: MEDICARE

## 2021-09-17 DIAGNOSIS — S29.012S STRAIN OF LATISSIMUS DORSI MUSCLE, SEQUELA: ICD-10-CM

## 2021-09-17 PROCEDURE — 97140 MANUAL THERAPY 1/> REGIONS: CPT

## 2021-09-17 PROCEDURE — 97110 THERAPEUTIC EXERCISES: CPT

## 2021-09-17 NOTE — PROGRESS NOTES
Dx: Strain of latissimus dorsi muscle, sequela (S29.012S)         Insurance (Authorized # of Visits):  10 POC            Authorizing Physician: Dr. Sarah Desouza  Next MD visit: none scheduled  Fall Risk: standard         Precautions: n/a             Subjective: 3x10 sec There ex  Thoracic extension over towel roll 4x in mid-lower  SKTC contralateral knee flexed 2x10 sec  DKTC 3x10 sec   HS stretch strap 3x30 sec ea  Calf stretch slant board 3x30 sec  Squat at // bar 2x10      X       X       HEP:  Access Code: [email or web address]

## 2021-09-20 ENCOUNTER — OFFICE VISIT (OUTPATIENT)
Dept: PHYSICAL THERAPY | Facility: HOSPITAL | Age: 76
End: 2021-09-20
Attending: FAMILY MEDICINE
Payer: MEDICARE

## 2021-09-20 DIAGNOSIS — S29.012S STRAIN OF LATISSIMUS DORSI MUSCLE, SEQUELA: ICD-10-CM

## 2021-09-20 PROCEDURE — 97140 MANUAL THERAPY 1/> REGIONS: CPT

## 2021-09-20 PROCEDURE — 97110 THERAPEUTIC EXERCISES: CPT

## 2021-09-20 NOTE — PROGRESS NOTES
Dx: Strain of latissimus dorsi muscle, sequela (S29.012S)         Insurance (Authorized # of Visits):  10 POC, 8 approved Griselda Panning Physician: Dr. Bharath Watkins  Next MD visit: none scheduled  Fall Risk: standard         Precautions: n/a             Keenan with skin lock, unilateral grade II-III to improve mobility  STM thoracic paraspinals/lumbar paraspinals  x18 min total     There ex  throacic rotation open book 5x ea  Thoracic extension over towel roll 4x in mid-lower  Supine SKTC with contralateral knee

## 2021-09-28 ENCOUNTER — OFFICE VISIT (OUTPATIENT)
Dept: PHYSICAL THERAPY | Facility: HOSPITAL | Age: 76
End: 2021-09-28
Attending: FAMILY MEDICINE
Payer: MEDICARE

## 2021-09-28 DIAGNOSIS — S29.012S STRAIN OF LATISSIMUS DORSI MUSCLE, SEQUELA: ICD-10-CM

## 2021-09-28 PROCEDURE — 97140 MANUAL THERAPY 1/> REGIONS: CPT

## 2021-09-28 PROCEDURE — 97110 THERAPEUTIC EXERCISES: CPT

## 2021-09-28 NOTE — PROGRESS NOTES
Dx: Strain of latissimus dorsi muscle, sequela (S29.012S)         Insurance (Authorized # of Visits):  10 POC, 8 approved An Cole Physician: Dr. Phillip Lambert  Next MD visit: none scheduled  Fall Risk: standard         Precautions: n/a             Keenan II-III to improve mobility  STM thoracic paraspinals/lumbar paraspinals  x20 min total Man there  P-A central with skin lock, unilateral grade II-III to improve mobility  STM thoracic paraspinals/lumbar paraspinals  x18 min total Man there  STM to lumbar p

## 2021-09-29 ENCOUNTER — LAB ENCOUNTER (OUTPATIENT)
Dept: LAB | Age: 76
End: 2021-09-29
Attending: FAMILY MEDICINE
Payer: MEDICARE

## 2021-09-29 DIAGNOSIS — Z95.5 HX OF HEART ARTERY STENT: ICD-10-CM

## 2021-09-29 DIAGNOSIS — N40.1 BENIGN PROSTATIC HYPERPLASIA WITH WEAK URINARY STREAM: ICD-10-CM

## 2021-09-29 DIAGNOSIS — R39.12 BENIGN PROSTATIC HYPERPLASIA WITH WEAK URINARY STREAM: ICD-10-CM

## 2021-09-29 PROCEDURE — 36415 COLL VENOUS BLD VENIPUNCTURE: CPT

## 2021-09-29 PROCEDURE — 80061 LIPID PANEL: CPT

## 2021-09-29 PROCEDURE — 80053 COMPREHEN METABOLIC PANEL: CPT

## 2021-10-01 ENCOUNTER — OFFICE VISIT (OUTPATIENT)
Dept: PHYSICAL THERAPY | Facility: HOSPITAL | Age: 76
End: 2021-10-01
Attending: FAMILY MEDICINE
Payer: MEDICARE

## 2021-10-01 DIAGNOSIS — S29.012S STRAIN OF LATISSIMUS DORSI MUSCLE, SEQUELA: ICD-10-CM

## 2021-10-01 PROCEDURE — 97140 MANUAL THERAPY 1/> REGIONS: CPT

## 2021-10-01 PROCEDURE — 97110 THERAPEUTIC EXERCISES: CPT

## 2021-10-01 NOTE — PROGRESS NOTES
Dx: Strain of latissimus dorsi muscle, sequela (S29.012S)         Insurance (Authorized # of Visits):  10 POC, 8 approved Nahomy Parra Physician: Dr. Stacie Rivero  Next MD visit: none scheduled  Fall Risk: standard         Precautions: n/a             Keenan paraspinals  x15 min total Man there  P-A central with skin lock, unilateral grade II-III to improve mobility  STM thoracic paraspinals/lumbar paraspinals  x20 min total Man there  P-A central with skin lock, unilateral grade II-III to improve mobility  ST Resistance - 1 x daily - 7 x weekly - 2 sets - 10 reps  Standing Hip Abduction with Resistance at Ankles and Counter Support - 1 x daily - 7 x weekly - 2 sets - 8 reps        Charges: man there x1, there ex x2       Total Timed Treatment: 45 min  Total Owen

## 2021-10-04 ENCOUNTER — OFFICE VISIT (OUTPATIENT)
Dept: PHYSICAL THERAPY | Facility: HOSPITAL | Age: 76
End: 2021-10-04
Attending: FAMILY MEDICINE
Payer: MEDICARE

## 2021-10-04 DIAGNOSIS — S29.012S STRAIN OF LATISSIMUS DORSI MUSCLE, SEQUELA: ICD-10-CM

## 2021-10-04 PROCEDURE — 97140 MANUAL THERAPY 1/> REGIONS: CPT

## 2021-10-04 PROCEDURE — 97110 THERAPEUTIC EXERCISES: CPT

## 2021-10-04 NOTE — PROGRESS NOTES
Dx: Strain of latissimus dorsi muscle, sequela (S29.012S)         Insurance (Authorized # of Visits):  10 POC, 8 approved Matthew Arellano Physician: Dr. Marilynn Bolivar  Next MD visit: none scheduled  Fall Risk: standard         Precautions: n/a             Keenan lock, unilateral grade II-III to improve mobility  STM thoracic paraspinals/lumbar paraspinals  x20 min total Man there  P-A central with skin lock, unilateral grade II-III to improve mobility  STM thoracic paraspinals/lumbar paraspinals  x18 min total Man weekly - 1 sets - 10 reps  Standing Anti-Rotation Press with Anchored Resistance - 1 x daily - 7 x weekly - 2 sets - 10 reps  Standing Hip Abduction with Resistance at Ankles and Counter Support - 1 x daily - 7 x weekly - 2 sets - 8 reps        Charges: ma

## 2021-10-08 ENCOUNTER — OFFICE VISIT (OUTPATIENT)
Dept: PHYSICAL THERAPY | Facility: HOSPITAL | Age: 76
End: 2021-10-08
Attending: FAMILY MEDICINE
Payer: MEDICARE

## 2021-10-08 DIAGNOSIS — S29.012S STRAIN OF LATISSIMUS DORSI MUSCLE, SEQUELA: ICD-10-CM

## 2021-10-08 PROCEDURE — 97140 MANUAL THERAPY 1/> REGIONS: CPT

## 2021-10-08 PROCEDURE — 97110 THERAPEUTIC EXERCISES: CPT

## 2021-10-08 NOTE — PROGRESS NOTES
Progress Summary  Pt has attended 8 visits in Physical Therapy.      Dx: Strain of latissimus dorsi muscle, sequela (S29.012S)         Insurance (Authorized # of Visits):  10 POC, 8 approved [address] Physician: Dr. Geetha Bowers  Next MD visit: none s independent and adherent in a comprehensive HEP      Plan: Continue skilled Physical Therapy 2 x/week or a total of 6 visits over a 90 day period.  Treatment will include: manual therapy, therapeutic exercise, neuromuscular re-education, therapeutic activit There ex  LTR 5x  TA contraction 5 sec hold hooklying 10x  TA contraction with march 10x ea  Dead bug LEs only 2x5  pallof walk red tube band 2x10 to L, 10x to R  Hip abduction yellow TB 2x8 ea  HS stretch 2x30 sec ea There ex  SKTC 3x20 sec ea  TA contrac

## 2021-10-11 ENCOUNTER — APPOINTMENT (OUTPATIENT)
Dept: PHYSICAL THERAPY | Facility: HOSPITAL | Age: 76
End: 2021-10-11
Payer: MEDICARE

## 2021-10-15 ENCOUNTER — OFFICE VISIT (OUTPATIENT)
Dept: PHYSICAL THERAPY | Facility: HOSPITAL | Age: 76
End: 2021-10-15
Attending: FAMILY MEDICINE
Payer: MEDICARE

## 2021-10-15 PROCEDURE — 97140 MANUAL THERAPY 1/> REGIONS: CPT

## 2021-10-15 PROCEDURE — 97110 THERAPEUTIC EXERCISES: CPT

## 2021-10-15 NOTE — PROGRESS NOTES
Dx: Strain of latissimus dorsi muscle, sequela (S29.012S)         Insurance (Authorized # of Visits):  15 approved    Authorizing Physician: Dr. Yulissa Martinez  Next MD visit: none scheduled  Fall Risk: standard         Precautions: n/a             Subjective:  Blossom Montezuma comprehensive HEP    Plan: prone alt UE/LE extension  Date: 9/20/2021           TX#: 4/10 Date: 9/28/2021          TX#: 5/10 Date: 10/1/2021  Tx#: 6/10 Date: 10/4/2021  Tx#: 7/8 Clayton Cervantes) Date: 10/8/2021  Tx#: 8/8 Clayton Cervantes) Date: 10/15/2021  Tx#: 9/12 Clayton Cervantes)   M medicine ball to shoulder height 5x  TA contraction supine 5x, with march 5x, with march/arm rise 5x There ex  sidelying shoulder abduction 5x10 sec  SKTC 2x30 sec ea  TA contraction with march/arm rise 5x  Seated lat pulldown blue TB 2x12  Standing 3 way

## 2021-10-22 ENCOUNTER — APPOINTMENT (OUTPATIENT)
Dept: CARDIOLOGY | Age: 76
End: 2021-10-22

## 2021-10-27 ENCOUNTER — OFFICE VISIT (OUTPATIENT)
Dept: PHYSICAL THERAPY | Facility: HOSPITAL | Age: 76
End: 2021-10-27
Attending: FAMILY MEDICINE
Payer: MEDICARE

## 2021-10-27 PROCEDURE — 97110 THERAPEUTIC EXERCISES: CPT

## 2021-10-27 PROCEDURE — 97140 MANUAL THERAPY 1/> REGIONS: CPT

## 2021-10-27 NOTE — PROGRESS NOTES
Dx: Strain of latissimus dorsi muscle, sequela (S29.012S)         Insurance (Authorized # of Visits):  15 approved    Authorizing Physician: Dr. Sawyer Larsen  Next MD visit: none scheduled  Fall Risk: standard         Precautions: n/a           Pain: 0/10 at re Parth Bañuelos)   Man there  STM to lumbar paraspinals, QL on L  Unilateral P-A L lower lumbar spine grade III Man there  STM to lumbar paraspinals, QL on L  Unilateral P-A L lower lumbar spine grade III Man there  STM to lumbar paraspinals, QL on L  Unilateral P-A table 2x30 sec ea   X X X X X X   HEP:  Access Code: [email or web address]    Exercises  Sidelying Thoracic Rotation with Open Book - 1 x daily - 7 x weekly - 1 sets - 10 reps  Hooklying Single Knee to Chest Stretch - 1 x daily - 7 x weekly - 3 sets - 3 reps - 20 sec h

## 2021-11-03 ENCOUNTER — OFFICE VISIT (OUTPATIENT)
Dept: PHYSICAL THERAPY | Facility: HOSPITAL | Age: 76
End: 2021-11-03
Attending: FAMILY MEDICINE
Payer: MEDICARE

## 2021-11-03 PROCEDURE — 97140 MANUAL THERAPY 1/> REGIONS: CPT

## 2021-11-03 PROCEDURE — 97110 THERAPEUTIC EXERCISES: CPT

## 2021-11-03 NOTE — PROGRESS NOTES
Dx: Strain of latissimus dorsi muscle, sequela (S29.012S)         Insurance (Authorized # of Visits):  15 approved    Authorizing Physician: Dr. Yulissa Martinez  Next MD visit: none scheduled  Fall Risk: standard         Precautions: n/a           Pain: 0/10 at re 11/12 Shellie De La Rosa)   Man there  STM to lumbar paraspinals, QL on L  Unilateral P-A L lower lumbar spine grade III Man there  STM to lumbar paraspinals, QL on L  Unilateral P-A L lower lumbar spine grade III Man there  STM to lumbar paraspinals, QL, hip abductors [id number]    Exercises  Sidelying Thoracic Rotation with Open Book - 1 x daily - 7 x weekly - 1 sets - 10 reps  Supine Lower Trunk Rotation - 1 x daily - 7 x weekly - 1 sets - 10 reps  Standing Anti-Rotation Press with Anchored Resistance - 1 x daily - 7 x

## 2021-11-16 ENCOUNTER — OFFICE VISIT (OUTPATIENT)
Dept: PHYSICAL THERAPY | Facility: HOSPITAL | Age: 76
End: 2021-11-16
Attending: FAMILY MEDICINE
Payer: MEDICARE

## 2021-11-16 PROCEDURE — 97110 THERAPEUTIC EXERCISES: CPT

## 2021-11-16 PROCEDURE — 97140 MANUAL THERAPY 1/> REGIONS: CPT

## 2021-11-16 NOTE — PROGRESS NOTES
Discharge Summary  Pt has attended 12 visits in Physical Therapy.      Dx: Strain of latissimus dorsi muscle, sequela (S29.012S)         Insurance (Authorized # of Visits):  15 approved  Authorizing Physician: Dr. Roseline Helton  Next MD visit: none scheduled  Fa treatment options and has agreed to actively participate in planning and for this course of care. Thank you for your referral. If you have any questions, please contact me at Dept: [phone].     Sincerely,  Electronically signed by therapist: Stephanie Holly band 2x5 ea  Standing hip abduction 2x12 ea   X X X X X   HEP:  Access Code: [email or web address]  Exercises  Sidelying Thoracic Rotation with Open Book - 1 x daily - 7 x weekly - 1 sets - 10 reps  Supine Lower Trunk Rotation - 1 x daily - 7 x weekly - 1 sets - 10 rep

## 2021-11-26 ENCOUNTER — TELEPHONE (OUTPATIENT)
Dept: FAMILY MEDICINE CLINIC | Facility: CLINIC | Age: 76
End: 2021-11-26

## 2021-11-26 NOTE — TELEPHONE ENCOUNTER
Spoke to pt he states back pain x 6 years and pt states he has wheezing at night which has also been ongoing since summer. Pt denies any other SX. Pt instructed to go to Immediate Care for evaluation for wheezing. Pt states he does not need Immediate Care nina

## 2021-12-03 ENCOUNTER — OFFICE VISIT (OUTPATIENT)
Dept: FAMILY MEDICINE CLINIC | Facility: CLINIC | Age: 76
End: 2021-12-03
Payer: MEDICARE

## 2021-12-03 VITALS
DIASTOLIC BLOOD PRESSURE: 78 MMHG | WEIGHT: 167.19 LBS | OXYGEN SATURATION: 99 % | SYSTOLIC BLOOD PRESSURE: 128 MMHG | HEART RATE: 58 BPM | HEIGHT: 72 IN | BODY MASS INDEX: 22.65 KG/M2 | RESPIRATION RATE: 18 BRPM

## 2021-12-03 DIAGNOSIS — R09.82 POST-NASAL DRIP: ICD-10-CM

## 2021-12-03 DIAGNOSIS — G89.29 CHRONIC BILATERAL LOW BACK PAIN WITHOUT SCIATICA: ICD-10-CM

## 2021-12-03 DIAGNOSIS — M54.50 CHRONIC BILATERAL LOW BACK PAIN WITHOUT SCIATICA: ICD-10-CM

## 2021-12-03 DIAGNOSIS — N40.1 BENIGN PROSTATIC HYPERPLASIA WITH WEAK URINARY STREAM: Primary | ICD-10-CM

## 2021-12-03 DIAGNOSIS — R39.12 BENIGN PROSTATIC HYPERPLASIA WITH WEAK URINARY STREAM: Primary | ICD-10-CM

## 2021-12-03 PROCEDURE — 99214 OFFICE O/P EST MOD 30 MIN: CPT | Performed by: FAMILY MEDICINE

## 2021-12-03 PROCEDURE — 3078F DIAST BP <80 MM HG: CPT | Performed by: FAMILY MEDICINE

## 2021-12-03 PROCEDURE — 3008F BODY MASS INDEX DOCD: CPT | Performed by: FAMILY MEDICINE

## 2021-12-03 PROCEDURE — 3074F SYST BP LT 130 MM HG: CPT | Performed by: FAMILY MEDICINE

## 2021-12-03 RX ORDER — TRIPROLIDINE/PSEUDOEPHEDRINE 2.5MG-60MG
TABLET ORAL
COMMUNITY
Start: 2021-10-27 | End: 2022-01-21 | Stop reason: ALTCHOICE

## 2021-12-03 RX ORDER — BROMFENAC 0.76 MG/ML
SOLUTION/ DROPS OPHTHALMIC
COMMUNITY
Start: 2021-10-27 | End: 2022-01-21 | Stop reason: ALTCHOICE

## 2021-12-03 RX ORDER — DIFLUPREDNATE 0.5 MG/ML
EMULSION OPHTHALMIC
COMMUNITY
Start: 2021-10-27 | End: 2022-01-21 | Stop reason: ALTCHOICE

## 2021-12-03 RX ORDER — BROMFENAC 0.76 MG/ML
1 SOLUTION/ DROPS OPHTHALMIC DAILY
COMMUNITY
Start: 2021-10-27 | End: 2022-01-21 | Stop reason: ALTCHOICE

## 2021-12-03 RX ORDER — MOXIFLOXACIN 5 MG/ML
SOLUTION/ DROPS OPHTHALMIC
COMMUNITY
Start: 2021-10-27 | End: 2022-01-21 | Stop reason: ALTCHOICE

## 2021-12-03 RX ORDER — FLUTICASONE PROPIONATE 50 MCG
2 SPRAY, SUSPENSION (ML) NASAL DAILY
Qty: 1 EACH | Refills: 1 | COMMUNITY
Start: 2021-12-03 | End: 2022-01-21 | Stop reason: ALTCHOICE

## 2021-12-03 RX ORDER — BESIFLOXACIN 6 MG/ML
1 SUSPENSION OPHTHALMIC 3 TIMES DAILY
COMMUNITY
Start: 2021-10-27 | End: 2022-01-21 | Stop reason: ALTCHOICE

## 2021-12-03 NOTE — PROGRESS NOTES
Here with several concerns ongoing from our visit in August and going on for several years. He is treated for BPH. He tolerates finasteride but it does not seem to be helping a lot. He cannot tolerate H2 blockers as they caused dizziness.   He tried stop CONTINUE AFTER AS DIRECTED     • Bromfenac Sodium (BROMSITE) 0.075 % Ophthalmic Solution Apply 1 drop to eye daily.      • DUREZOL 0.05 % Ophthalmic Emulsion START AFTER SURGERY, INSTILL ONE DROP IN SURGICAL EYE TWICE A DAY FOR 1 WEEK, THEN ONE TIME DAILY F test is negative in the seated and supine positions. Reflexes are normal.  Light touch is equal right to left. I reviewed the x-rays from February 2019 with a little bit of degenerative disc disease. ASSESSMENT/PLAN:    1.  Benign prostatic hyperplas

## 2021-12-03 NOTE — PATIENT INSTRUCTIONS
Angle nasal spray to outside of nostril to avoid damaging nasal septum over the long term. After one month, if not better, chest x ray. Ask for open MRI. Please call [phone] or use [employer] to schedule for laboratories or radiology procedures.

## 2022-01-03 ENCOUNTER — HOSPITAL ENCOUNTER (OUTPATIENT)
Dept: MRI IMAGING | Age: 77
Discharge: HOME | End: 2022-01-03
Attending: FAMILY MEDICINE
Payer: MEDICARE

## 2022-01-03 DIAGNOSIS — G89.29 CHRONIC BILATERAL LOW BACK PAIN WITHOUT SCIATICA: ICD-10-CM

## 2022-01-03 DIAGNOSIS — M54.50 CHRONIC BILATERAL LOW BACK PAIN WITHOUT SCIATICA: ICD-10-CM

## 2022-01-03 PROCEDURE — 72148 MRI LUMBAR SPINE W/O DYE: CPT | Performed by: FAMILY MEDICINE

## 2022-01-04 ENCOUNTER — PATIENT MESSAGE (OUTPATIENT)
Dept: FAMILY MEDICINE CLINIC | Facility: CLINIC | Age: 77
End: 2022-01-04

## 2022-01-04 DIAGNOSIS — M54.50 CHRONIC BILATERAL LOW BACK PAIN WITHOUT SCIATICA: Primary | ICD-10-CM

## 2022-01-04 DIAGNOSIS — G89.29 CHRONIC BILATERAL LOW BACK PAIN WITHOUT SCIATICA: Primary | ICD-10-CM

## 2022-01-05 ENCOUNTER — PATIENT MESSAGE (OUTPATIENT)
Dept: FAMILY MEDICINE CLINIC | Facility: CLINIC | Age: 77
End: 2022-01-05

## 2022-01-05 DIAGNOSIS — E78.00 HYPERCHOLESTEROLEMIA: ICD-10-CM

## 2022-01-05 DIAGNOSIS — I10 ESSENTIAL HYPERTENSION: ICD-10-CM

## 2022-01-05 RX ORDER — VALSARTAN 80 MG/1
80 TABLET ORAL DAILY
Qty: 30 TABLET | Refills: 0 | Status: SHIPPED | OUTPATIENT
Start: 2022-01-05 | End: 2022-01-05

## 2022-01-05 RX ORDER — LOSARTAN POTASSIUM 50 MG/1
50 TABLET ORAL DAILY
Qty: 90 TABLET | Refills: 3 | Status: SHIPPED | OUTPATIENT
Start: 2022-01-05 | End: 2022-01-05 | Stop reason: RX

## 2022-01-05 RX ORDER — VALSARTAN 80 MG/1
TABLET ORAL
Qty: 90 TABLET | Refills: 0 | Status: SHIPPED | OUTPATIENT
Start: 2022-01-05

## 2022-01-05 NOTE — TELEPHONE ENCOUNTER
From: Bijal Espinoza  To: Erica Meza MD  Sent: 1/5/2022 3:08 PM CST  Subject: Prescription for Losartan    I am unable to obtain Losartan from [facility], they say it is out of stock.  Yonathan Reece simply sent another request for more Losartan to the pha

## 2022-01-05 NOTE — TELEPHONE ENCOUNTER
From: Nicko Contreras  To: Jose Miguel Parks MD  Sent: 1/4/2022 8:53 AM CST  Subject: Back pain suggesstion    Thank you for your rapid response. I would love to speak with the Dr. Kusum Brito regarding injections. I will need a referral to see him.    Ximena Alex

## 2022-01-21 ENCOUNTER — ANCILLARY ORDERS (OUTPATIENT)
Dept: FAMILY MEDICINE CLINIC | Facility: CLINIC | Age: 77
End: 2022-01-21

## 2022-01-21 ENCOUNTER — HOSPITAL ENCOUNTER (OUTPATIENT)
Dept: GENERAL RADIOLOGY | Age: 77
Discharge: HOME | End: 2022-01-21
Attending: FAMILY MEDICINE
Payer: MEDICARE

## 2022-01-21 ENCOUNTER — OFFICE VISIT (OUTPATIENT)
Dept: FAMILY MEDICINE CLINIC | Facility: CLINIC | Age: 77
End: 2022-01-21
Payer: MEDICARE

## 2022-01-21 VITALS
WEIGHT: 167.81 LBS | SYSTOLIC BLOOD PRESSURE: 122 MMHG | DIASTOLIC BLOOD PRESSURE: 78 MMHG | HEIGHT: 72 IN | HEART RATE: 79 BPM | BODY MASS INDEX: 22.73 KG/M2 | RESPIRATION RATE: 18 BRPM | OXYGEN SATURATION: 95 %

## 2022-01-21 DIAGNOSIS — R06.2 WHEEZING: ICD-10-CM

## 2022-01-21 DIAGNOSIS — R09.82 POST-NASAL DRIP: Primary | ICD-10-CM

## 2022-01-21 DIAGNOSIS — J98.6 ELEVATED DIAPHRAGM: Primary | ICD-10-CM

## 2022-01-21 DIAGNOSIS — H40.001 GLAUCOMA SUSPECT OF RIGHT EYE: ICD-10-CM

## 2022-01-21 DIAGNOSIS — M54.32 BILATERAL SCIATICA: ICD-10-CM

## 2022-01-21 DIAGNOSIS — M54.31 BILATERAL SCIATICA: ICD-10-CM

## 2022-01-21 PROCEDURE — 3078F DIAST BP <80 MM HG: CPT | Performed by: FAMILY MEDICINE

## 2022-01-21 PROCEDURE — 99214 OFFICE O/P EST MOD 30 MIN: CPT | Performed by: FAMILY MEDICINE

## 2022-01-21 PROCEDURE — 3008F BODY MASS INDEX DOCD: CPT | Performed by: FAMILY MEDICINE

## 2022-01-21 PROCEDURE — 3074F SYST BP LT 130 MM HG: CPT | Performed by: FAMILY MEDICINE

## 2022-01-21 PROCEDURE — 71046 X-RAY EXAM CHEST 2 VIEWS: CPT | Performed by: FAMILY MEDICINE

## 2022-01-21 RX ORDER — LOSARTAN POTASSIUM 50 MG/1
TABLET ORAL DAILY
COMMUNITY

## 2022-01-21 RX ORDER — AZELASTINE 1 MG/ML
1 SPRAY, METERED NASAL 2 TIMES DAILY
Qty: 1 EACH | Refills: 0 | Status: SHIPPED | OUTPATIENT
Start: 2022-01-21

## 2022-01-21 NOTE — PROGRESS NOTES
Here with continued postnasal drip causing a hoarse voice and cough. The cough is sometimes productive of clear mucus. Later on in our discussion he does admit he hears some wheezing. An x-ray from 2011 shows hyperinflation consistent with emphysema.   H AT BEDTIME 90 tablet 3   • Sildenafil Citrate (VIAGRA) 100 MG Oral Tab Take 1 tablet (100 mg total) by mouth as needed for Erectile Dysfunction. 4 tablet 11   • Aspirin 81 MG Oral Tab Take 81 mg by mouth daily.      • VALSARTAN 80 MG Oral Tab TAKE 1 TABLET( patient in face to face discussion and examination, and the time documenting the visit. It may also include time ordering tests or reviewing test results completed on the same day.

## 2022-01-25 ENCOUNTER — OFFICE VISIT (OUTPATIENT)
Dept: PAIN CLINIC | Facility: CLINIC | Age: 77
End: 2022-01-25
Payer: MEDICARE

## 2022-01-25 VITALS
OXYGEN SATURATION: 96 % | WEIGHT: 164 LBS | BODY MASS INDEX: 22 KG/M2 | SYSTOLIC BLOOD PRESSURE: 104 MMHG | DIASTOLIC BLOOD PRESSURE: 62 MMHG | HEART RATE: 73 BPM

## 2022-01-25 DIAGNOSIS — M47.816 LUMBAR FACET ARTHROPATHY: Primary | ICD-10-CM

## 2022-01-25 PROCEDURE — 3078F DIAST BP <80 MM HG: CPT | Performed by: ANESTHESIOLOGY

## 2022-01-25 PROCEDURE — 99203 OFFICE O/P NEW LOW 30 MIN: CPT | Performed by: ANESTHESIOLOGY

## 2022-01-25 PROCEDURE — 3074F SYST BP LT 130 MM HG: CPT | Performed by: ANESTHESIOLOGY

## 2022-01-25 RX ORDER — OMEPRAZOLE 20 MG/1
1 TABLET, DELAYED RELEASE ORAL DAILY
COMMUNITY
Start: 2021-10-22

## 2022-01-25 RX ORDER — TAMSULOSIN HYDROCHLORIDE 0.4 MG/1
0.4 CAPSULE ORAL DAILY
COMMUNITY
Start: 2021-10-14

## 2022-01-25 NOTE — PROGRESS NOTES
Subjective:   Patient ID: Amanda Bazzi is a 64year old male.     HPI    History/Other:   Review of Systems  Current Outpatient Medications   Medication Sig Dispense Refill   • sulfamethoxazole-trimethoprim -160 MG Oral Tab per tablet Take 1 tabl sharp/stabbing    Origin of Pain:    Lifting    Aggravating Factors:     Other bending    Past Treatments for Current Pain Condition:   Physical Therapy    Prior diagnostic testing for your pain:  Xray, MRI

## 2022-01-26 NOTE — PROGRESS NOTES
Name: Jeff Angeles   : 1945   DOS: 2022     Chief complaint: Low back pain    History of present illness:  Jeff Angeles is a 64year old male complaining of  pain in the lower back for 3 years. Pain started after he lifted something. daily. 90 tablet 3   • simvastatin 20 MG Oral Tab TAKE 1 TABLET BY MOUTH EVERY NIGHT AT BEDTIME 90 tablet 3   • Sildenafil Citrate (VIAGRA) 100 MG Oral Tab Take 1 tablet (100 mg total) by mouth as needed for Erectile Dysfunction.  4 tablet 11   • Aspirin 81 Specifically denies phlebitis, DVT, PE, bleeding problems, hemophilia or any other vascular problems. Dermatology: Negative for all skin problems. Hematolgy/Lymph: negative for all the hematological problems.   Neuropsychiatric:  Denies, anxiety, depressi the pain better, extension and lateral rotation of the spine makes the pain worse. Jesus's, thigh thrust, the Stork or Gillet, SI joint compression, Yeoman's and Gaenslen's tests are positive bilaterally. Romberg test is positive bilaterally.  Straight l be off for 7 days before I could do the injection. The patient will also need right diagnostic lumbar facet joint injection but after going back on Plavix for 2 weeks and then he can be off. Thank you very much for referring the patient to me.

## 2022-01-28 ENCOUNTER — TELEPHONE (OUTPATIENT)
Dept: PAIN CLINIC | Facility: CLINIC | Age: 77
End: 2022-01-28

## 2022-01-28 ENCOUNTER — TELEPHONE (OUTPATIENT)
Dept: NEUROLOGY | Facility: CLINIC | Age: 77
End: 2022-01-28

## 2022-01-28 NOTE — TELEPHONE ENCOUNTER
Bijal Alfaro only approves a maximum of two levels. Please advise the selected levels to proceed with for auth, thanks!

## 2022-01-28 NOTE — TELEPHONE ENCOUNTER
22  RE: Du Daniela    : 1945    Dear Dr. Peter Mar    Your patient is being scheduled for a pain management procedure at [facility] within the next 4 weeks. Procedure: Left Lumbar Facet Injection at L3-4, L4-5   Physician: Dr. Estella Harris- Anesthesiologist     Your patient is currently taking Plavix. Dr. Estella Harris usually recommends the medication be held for 7 days prior to injection. Please verify patient is cleared to proceed with pain management procedures.       Clearance Approved   ____________    Clearance Denied       ____________

## 2022-01-28 NOTE — TELEPHONE ENCOUNTER
Rec'd medical clearance from Dr. Katya Epperson via [address] stating pt can hold Plavix for 7 days prior to the procedure. Pt can be scheduled once PA is rec'd.

## 2022-01-28 NOTE — TELEPHONE ENCOUNTER
Pt called stated Dr Katya Epperson manages his plavix. States he only see's his cardiologist Dr Rickey Ndiaye once a year.

## 2022-02-01 NOTE — TELEPHONE ENCOUNTER
Question Answer   Anesthesia Type LOCAL   Provider Amber Brown   Location Lab   Procedure Facet   Laterality/Level Left diagnostic lumbar facet joint injection at L3-4, L4-5 level under fluoroscopy   Implants No   Medical clearance requested (will send to Pain Navigator) No   Patient has Medicare coverage?  No

## 2022-02-01 NOTE — TELEPHONE ENCOUNTER
Prior authorization request completed for: Left facet L3, L4  Authorization # [id number]  Authorization dates: 1/28/2022 - 2/28/2022  CPT codes approved: 89705 / 95486   Number of visits/dates of service approved: 1  Physician: Dr. Estella Harris   Location: THE [facility]

## 2022-02-09 ENCOUNTER — APPOINTMENT (OUTPATIENT)
Dept: GENERAL RADIOLOGY | Facility: HOSPITAL | Age: 77
End: 2022-02-09
Attending: ANESTHESIOLOGY
Payer: MEDICARE

## 2022-02-09 ENCOUNTER — HOSPITAL ENCOUNTER (OUTPATIENT)
Facility: HOSPITAL | Age: 77
Setting detail: HOSPITAL OUTPATIENT SURGERY
Discharge: HOME | End: 2022-02-09
Attending: ANESTHESIOLOGY | Admitting: ANESTHESIOLOGY
Payer: MEDICARE

## 2022-02-09 VITALS
SYSTOLIC BLOOD PRESSURE: 121 MMHG | BODY MASS INDEX: 22.08 KG/M2 | HEART RATE: 98 BPM | RESPIRATION RATE: 17 BRPM | DIASTOLIC BLOOD PRESSURE: 65 MMHG | TEMPERATURE: 99 F | HEIGHT: 72 IN | WEIGHT: 163 LBS | OXYGEN SATURATION: 98 %

## 2022-02-09 DIAGNOSIS — M47.816 LUMBAR SPONDYLOSIS: ICD-10-CM

## 2022-02-09 PROCEDURE — 3E0U3BZ INTRODUCTION OF ANESTHETIC AGENT INTO JOINTS, PERCUTANEOUS APPROACH: ICD-10-PCS | Performed by: ANESTHESIOLOGY

## 2022-02-09 PROCEDURE — 3E0U33Z INTRODUCTION OF ANTI-INFLAMMATORY INTO JOINTS, PERCUTANEOUS APPROACH: ICD-10-PCS | Performed by: ANESTHESIOLOGY

## 2022-02-09 PROCEDURE — BR16ZZZ FLUOROSCOPY OF LUMBAR FACET JOINT(S): ICD-10-PCS | Performed by: ANESTHESIOLOGY

## 2022-02-09 RX ORDER — ONDANSETRON 2 MG/ML
4 INJECTION INTRAMUSCULAR; INTRAVENOUS ONCE AS NEEDED
OUTPATIENT
Start: 2022-02-09 | End: 2022-02-09

## 2022-02-09 RX ORDER — DIPHENHYDRAMINE HYDROCHLORIDE 50 MG/ML
50 INJECTION INTRAMUSCULAR; INTRAVENOUS ONCE AS NEEDED
OUTPATIENT
Start: 2022-02-09 | End: 2022-02-09

## 2022-02-09 RX ORDER — LIDOCAINE HYDROCHLORIDE 10 MG/ML
INJECTION, SOLUTION EPIDURAL; INFILTRATION; INTRACAUDAL; PERINEURAL
Status: DISCONTINUED | OUTPATIENT
Start: 2022-02-09 | End: 2022-02-09

## 2022-02-09 RX ORDER — METHYLPREDNISOLONE ACETATE 40 MG/ML
INJECTION, SUSPENSION INTRA-ARTICULAR; INTRALESIONAL; INTRAMUSCULAR; SOFT TISSUE
Status: DISCONTINUED | OUTPATIENT
Start: 2022-02-09 | End: 2022-02-09

## 2022-02-09 NOTE — OPERATIVE REPORT
[facility]  Operative Report  2022     Shasta Andujar Patient Status:  Hospital Outpatient Surgery    1945 MRN [id number]   Location [address] [address] Attending Maco Shen MD   Hosp Day # 0 PCP Abhijeet Herron MD     Indication: Mary Ruby is a 64year old male patient with chronic low back pain failed conservative treatment with medication and physical therapy was here for a left diagnostic lumbar facet joint injection. Preoperative Diagnosis:  Lumbar spondylosis [M47.816]  Lumbar facet arthropathy    Postoperative Diagnosis: Same as above. Procedure performed: Left diagnostic lumbar facet joint injection at L3-L4 and L4-L5 level under fluoroscopy    Anesthesia: Local.    EBL: Less than 1 ml. Procedure Description:  After reviewing the patient's history and performing a focused physical examination, the diagnosis was confirmed and contraindications such as infection and coagulopathy were ruled out. Following review of allergies and review of potential side effects and complications, including but not necessarily limited to infection, allergic reaction, local tissue breakdown, nerve injury, and paresis, the patient indicated they understood and agreed to proceed. After obtaining the informed consent, the patient was brought to the procedure room and monitored. The vital signs were monitored and recorded by an experienced RN. In the prone position, following sterile prep and drape of the lumbar region, the corresponding facet joints were identified under fluoroscopy. The skin was anesthetized via 25-gauge 1.5\" needle with approximately 2 mL of 1% lidocaine. A 22-gauge 3.5\" Quincke spinal needle was introduced and advanced into each corresponding facet joint at left L3-L4 and L4-L5 level atraumatically under fluoroscopic guidance.   Following negative aspiration for CSF and blood, approximately 1 mL of 1% lidocaine with 20 mg of methylprednisolone was injected into each joint without complication. The needle was withdrawn with stylet in situ after being flushed with 0.5 mL lidocaine. The patient tolerated procedure very well. The patient was observed until discharge criteria met. Discharge instructions were given and patient was released to a responsible adult. Complications: None. Follow up: The patient was followed in the pain clinic as needed basis.     Deepti Gimenez MD

## 2022-02-28 ENCOUNTER — LAB ENCOUNTER (OUTPATIENT)
Dept: LAB | Age: 77
End: 2022-02-28
Attending: FAMILY MEDICINE
Payer: MEDICARE

## 2022-02-28 ENCOUNTER — OFFICE VISIT (OUTPATIENT)
Dept: FAMILY MEDICINE CLINIC | Facility: CLINIC | Age: 77
End: 2022-02-28
Payer: MEDICARE

## 2022-02-28 VITALS
HEIGHT: 72 IN | RESPIRATION RATE: 18 BRPM | WEIGHT: 165 LBS | OXYGEN SATURATION: 98 % | HEART RATE: 86 BPM | DIASTOLIC BLOOD PRESSURE: 66 MMHG | BODY MASS INDEX: 22.35 KG/M2 | SYSTOLIC BLOOD PRESSURE: 100 MMHG

## 2022-02-28 DIAGNOSIS — K21.9 GASTROESOPHAGEAL REFLUX DISEASE, UNSPECIFIED WHETHER ESOPHAGITIS PRESENT: ICD-10-CM

## 2022-02-28 DIAGNOSIS — N20.0 NEPHROLITHIASIS: ICD-10-CM

## 2022-02-28 DIAGNOSIS — I10 PRIMARY HYPERTENSION: ICD-10-CM

## 2022-02-28 DIAGNOSIS — Z01.818 PREOP GENERAL PHYSICAL EXAM: ICD-10-CM

## 2022-02-28 DIAGNOSIS — R39.12 BENIGN PROSTATIC HYPERPLASIA WITH WEAK URINARY STREAM: ICD-10-CM

## 2022-02-28 DIAGNOSIS — Z01.818 PREOP GENERAL PHYSICAL EXAM: Primary | ICD-10-CM

## 2022-02-28 DIAGNOSIS — N40.1 BENIGN PROSTATIC HYPERPLASIA WITH WEAK URINARY STREAM: ICD-10-CM

## 2022-02-28 DIAGNOSIS — E78.00 HYPERCHOLESTEROLEMIA: ICD-10-CM

## 2022-02-28 LAB
ALBUMIN SERPL-MCNC: 3.6 G/DL (ref 3.4–5)
ALBUMIN/GLOB SERPL: 1 {RATIO} (ref 1–2)
ALP LIVER SERPL-CCNC: 59 U/L
ALT SERPL-CCNC: 18 U/L
ANION GAP SERPL CALC-SCNC: 4 MMOL/L (ref 0–18)
AST SERPL-CCNC: 18 U/L (ref 15–37)
BILIRUB SERPL-MCNC: 0.4 MG/DL (ref 0.1–2)
BUN BLD-MCNC: 21 MG/DL (ref 7–18)
CALCIUM BLD-MCNC: 9.1 MG/DL (ref 8.5–10.1)
CHLORIDE SERPL-SCNC: 108 MMOL/L (ref 98–112)
CO2 SERPL-SCNC: 27 MMOL/L (ref 21–32)
CREAT BLD-MCNC: 1.3 MG/DL
FASTING STATUS PATIENT QL REPORTED: NO
GLOBULIN PLAS-MCNC: 3.6 G/DL (ref 2.8–4.4)
GLUCOSE BLD-MCNC: 103 MG/DL (ref 70–99)
OSMOLALITY SERPL CALC.SUM OF ELEC: 291 MOSM/KG (ref 275–295)
POTASSIUM SERPL-SCNC: 4.5 MMOL/L (ref 3.5–5.1)
PROT SERPL-MCNC: 7.2 G/DL (ref 6.4–8.2)
SODIUM SERPL-SCNC: 139 MMOL/L (ref 136–145)

## 2022-02-28 PROCEDURE — 80053 COMPREHEN METABOLIC PANEL: CPT

## 2022-02-28 PROCEDURE — 3008F BODY MASS INDEX DOCD: CPT | Performed by: FAMILY MEDICINE

## 2022-02-28 PROCEDURE — 3074F SYST BP LT 130 MM HG: CPT | Performed by: FAMILY MEDICINE

## 2022-02-28 PROCEDURE — 93000 ELECTROCARDIOGRAM COMPLETE: CPT | Performed by: FAMILY MEDICINE

## 2022-02-28 PROCEDURE — 99214 OFFICE O/P EST MOD 30 MIN: CPT | Performed by: FAMILY MEDICINE

## 2022-02-28 PROCEDURE — 3078F DIAST BP <80 MM HG: CPT | Performed by: FAMILY MEDICINE

## 2022-02-28 PROCEDURE — 36415 COLL VENOUS BLD VENIPUNCTURE: CPT

## 2022-02-28 RX ORDER — OMEPRAZOLE 40 MG/1
40 CAPSULE, DELAYED RELEASE ORAL DAILY
Qty: 90 CAPSULE | Refills: 0 | Status: SHIPPED | OUTPATIENT
Start: 2022-02-28 | End: 2022-03-03

## 2022-03-02 ENCOUNTER — PATIENT MESSAGE (OUTPATIENT)
Dept: FAMILY MEDICINE CLINIC | Facility: CLINIC | Age: 77
End: 2022-03-02

## 2022-03-02 NOTE — TELEPHONE ENCOUNTER
From: Bell Gamino  To: Richie Rubio MD  Sent: 3/2/2022 8:54 AM CST  Subject: Prescription for Dolly Lemons    Thank you for contacting Dr. Florian Burt office regarding by CT Scan. It has now been authorized and I will make appointment. Prescription for my Alice Chimera is still not ready  after two days. Help! Thanks again.    Dolly Lemons

## 2022-03-03 ENCOUNTER — TELEPHONE (OUTPATIENT)
Dept: FAMILY MEDICINE CLINIC | Facility: CLINIC | Age: 77
End: 2022-03-03

## 2022-03-03 RX ORDER — CIMETIDINE 400 MG/1
400 TABLET, FILM COATED ORAL 2 TIMES DAILY
Qty: 180 TABLET | Refills: 0 | Status: SHIPPED | OUTPATIENT
Start: 2022-03-03

## 2022-03-03 NOTE — TELEPHONE ENCOUNTER
Pt asking to call pharmacy regarding the omeprazole script - pharmacy will not release to him.   pls advise

## 2022-03-03 NOTE — TELEPHONE ENCOUNTER
Spoke to pharmacy, drug interaction between Omeprazole and plavix- inhibits effectiveness,   Okay to dispense?

## 2022-03-15 ENCOUNTER — OFFICE VISIT (OUTPATIENT)
Dept: PAIN CLINIC | Facility: CLINIC | Age: 77
End: 2022-03-15
Payer: MEDICARE

## 2022-03-15 VITALS — OXYGEN SATURATION: 97 % | HEART RATE: 66 BPM | DIASTOLIC BLOOD PRESSURE: 70 MMHG | SYSTOLIC BLOOD PRESSURE: 110 MMHG

## 2022-03-15 DIAGNOSIS — M46.1 SACROILIITIS (HCC): Primary | ICD-10-CM

## 2022-03-15 PROCEDURE — 3074F SYST BP LT 130 MM HG: CPT | Performed by: ANESTHESIOLOGY

## 2022-03-15 PROCEDURE — 99212 OFFICE O/P EST SF 10 MIN: CPT | Performed by: ANESTHESIOLOGY

## 2022-03-15 PROCEDURE — 3078F DIAST BP <80 MM HG: CPT | Performed by: ANESTHESIOLOGY

## 2022-03-15 NOTE — PROGRESS NOTES
Last procedure: LUMBAR FACET INJECTION LEFT L3/4 and L4/5 with local  Date: 02/09/22  Percentage of relief obtained: 0%  Duration of relief: none    Current Pain Score: 0-8/10

## 2022-03-22 ENCOUNTER — LAB REQUISITION (OUTPATIENT)
Dept: LAB | Facility: HOSPITAL | Age: 77
End: 2022-03-22
Payer: MEDICARE

## 2022-03-22 PROCEDURE — 88300 SURGICAL PATH GROSS: CPT | Performed by: UROLOGY

## 2022-03-22 PROCEDURE — 82365 CALCULUS SPECTROSCOPY: CPT | Performed by: UROLOGY

## 2022-03-27 LAB — CALCULI MASS: 1684 MG

## 2022-03-29 ENCOUNTER — HOSPITAL ENCOUNTER (OUTPATIENT)
Dept: CT IMAGING | Facility: HOSPITAL | Age: 77
Discharge: HOME | End: 2022-03-29
Attending: INTERNAL MEDICINE
Payer: MEDICARE

## 2022-03-29 DIAGNOSIS — R05.9 COUGH: ICD-10-CM

## 2022-03-29 DIAGNOSIS — R06.00 DYSPNEA, UNSPECIFIED TYPE: ICD-10-CM

## 2022-03-29 PROCEDURE — 71250 CT THORAX DX C-: CPT | Performed by: INTERNAL MEDICINE

## 2022-03-30 ENCOUNTER — HOSPITAL ENCOUNTER (OUTPATIENT)
Dept: GENERAL RADIOLOGY | Facility: HOSPITAL | Age: 77
Discharge: HOME | End: 2022-03-30
Attending: INTERNAL MEDICINE
Payer: MEDICARE

## 2022-03-30 DIAGNOSIS — J98.6 ELEVATED DIAPHRAGM: ICD-10-CM

## 2022-03-30 PROCEDURE — 76000 FLUOROSCOPY <1 HR PHYS/QHP: CPT | Performed by: INTERNAL MEDICINE

## 2022-04-12 ENCOUNTER — TELEPHONE (OUTPATIENT)
Dept: PAIN CLINIC | Facility: CLINIC | Age: 77
End: 2022-04-12

## 2022-04-12 NOTE — TELEPHONE ENCOUNTER
Pt called because he says that he was told at last OV to call back in 4 weeks to schedule injection. I advised pt that Dr. Harriett Dandy did not place the order for procedure. I advised that nurse will consult with Dr. Harriett Dandy and give a call back. Pt understood and had NFQ.

## 2022-04-12 NOTE — TELEPHONE ENCOUNTER
Per ruddy notes from [address] [place] [address] w/ Dr Lashae Pabon, pt is s/p urolift on 3/22/2022. LOV Notes w/ Dr Mayela Mccoy: 3/15/2022  Plan: I had long discussion with the patient about his management. As the patient failed conservative treatment with medication and physical therapy, I strongly recommend the patient to come back for a bilateral sacroiliac joint arthrogram and injection under fluoroscopy with intravenous sedation. The patient is on Plavix. The patient will need clearance from his physician to be off for 7 days before I could do the injection. The patient is going for a UroLift procedure with Dr. Lashae Pabon. I told the patient to call to reschedule after 4 to 6 weeks of the procedure. Thank you very much for referring the patient to me.

## 2022-04-13 NOTE — TELEPHONE ENCOUNTER
La Jasso MD  You 10 hours ago (10:18 PM)     Nancy Harrington, he can come back for procedure after a April 22, 2022    Message text

## 2022-04-18 ENCOUNTER — TELEPHONE (OUTPATIENT)
Dept: PAIN CLINIC | Facility: CLINIC | Age: 77
End: 2022-04-18

## 2022-04-18 ENCOUNTER — TELEPHONE (OUTPATIENT)
Dept: NEUROLOGY | Facility: CLINIC | Age: 77
End: 2022-04-18

## 2022-04-18 NOTE — TELEPHONE ENCOUNTER
22  RE: Bijal Espinoza    : 1945    Dear Dr. Giulia Weiss patient is being scheduled for a pain management procedure at [facility] within the next 4 weeks. Procedure: Bilateral SI Joint Injections  Physician: Dr. Perfecto Webster- Anesthesiologist     Your patient is currently taking Plavix. Dr. Perfecto Webster usually recommends the medication be held for 7 days prior to injection. Please verify patient is cleared to proceed with pain management procedures.       Clearance Approved   ____________    Clearance Denied       ____________

## 2022-04-18 NOTE — TELEPHONE ENCOUNTER
I cannot open the form to update it. He takes Plavix for peripheral artery disease. Therefore he may hold it for 7 days prior to scheduled procedure.

## 2022-04-18 NOTE — TELEPHONE ENCOUNTER
Question Answer Comment   Anesthesia Type Local    Provider [facility]    Procedure SI Joint    Implants No    Medical clearance requested (will send to Pain Navigator) Yes Plavix   Patient has Medicare coverage?  No    Comments (Please list entire procedure name here.) Bilateral sacroiliac joint arthrogram and injection under fluoroscopy with intravenous sedation

## 2022-04-18 NOTE — TELEPHONE ENCOUNTER
Faxed medical clearance to Dr. Huizar Congress office at [phone] and routed via in basket. Confirmation rec'd.

## 2022-04-18 NOTE — TELEPHONE ENCOUNTER
Prior authorization request completed for: Bilateral SI joint injection   Authorization # [id number]  Authorization dates: 4/18/2022 - 5/18/2022  CPT codes approved: 50490  Number of visits/dates of service approved: 1  Physician: Dr. Lazaro Hairston   Location: [facility]       Patient can be scheduled. Routed to Navigator.

## 2022-04-18 NOTE — TELEPHONE ENCOUNTER
Med clear response received from Dr. Peter Mar via [facility] stating that pt may hold Plavix for 7 days prior to the procedure.

## 2022-04-27 ENCOUNTER — APPOINTMENT (OUTPATIENT)
Dept: GENERAL RADIOLOGY | Facility: HOSPITAL | Age: 77
End: 2022-04-27
Attending: ANESTHESIOLOGY
Payer: MEDICARE

## 2022-04-27 ENCOUNTER — HOSPITAL ENCOUNTER (OUTPATIENT)
Facility: HOSPITAL | Age: 77
Setting detail: HOSPITAL OUTPATIENT SURGERY
Discharge: HOME | End: 2022-04-27
Attending: ANESTHESIOLOGY | Admitting: ANESTHESIOLOGY
Payer: MEDICARE

## 2022-04-27 VITALS
OXYGEN SATURATION: 97 % | WEIGHT: 163 LBS | HEIGHT: 72 IN | DIASTOLIC BLOOD PRESSURE: 73 MMHG | SYSTOLIC BLOOD PRESSURE: 136 MMHG | HEART RATE: 63 BPM | TEMPERATURE: 98 F | BODY MASS INDEX: 22.08 KG/M2 | RESPIRATION RATE: 16 BRPM

## 2022-04-27 DIAGNOSIS — M46.1 SACROILIITIS (HCC): ICD-10-CM

## 2022-04-27 PROCEDURE — 3E0R33Z INTRODUCTION OF ANTI-INFLAMMATORY INTO SPINAL CANAL, PERCUTANEOUS APPROACH: ICD-10-PCS | Performed by: ANESTHESIOLOGY

## 2022-04-27 RX ORDER — LIDOCAINE HYDROCHLORIDE 10 MG/ML
INJECTION, SOLUTION EPIDURAL; INFILTRATION; INTRACAUDAL; PERINEURAL
Status: DISCONTINUED | OUTPATIENT
Start: 2022-04-27 | End: 2022-04-27

## 2022-04-27 RX ORDER — ONDANSETRON 2 MG/ML
4 INJECTION INTRAMUSCULAR; INTRAVENOUS ONCE AS NEEDED
Status: DISCONTINUED | OUTPATIENT
Start: 2022-04-27 | End: 2022-04-27

## 2022-04-27 RX ORDER — SODIUM CHLORIDE, SODIUM LACTATE, POTASSIUM CHLORIDE, CALCIUM CHLORIDE 600; 310; 30; 20 MG/100ML; MG/100ML; MG/100ML; MG/100ML
100 INJECTION, SOLUTION INTRAVENOUS CONTINUOUS
Status: DISCONTINUED | OUTPATIENT
Start: 2022-04-27 | End: 2022-04-27

## 2022-04-27 RX ORDER — METHYLPREDNISOLONE ACETATE 40 MG/ML
INJECTION, SUSPENSION INTRA-ARTICULAR; INTRALESIONAL; INTRAMUSCULAR; SOFT TISSUE
Status: DISCONTINUED | OUTPATIENT
Start: 2022-04-27 | End: 2022-04-27

## 2022-04-27 RX ORDER — DIPHENHYDRAMINE HYDROCHLORIDE 50 MG/ML
50 INJECTION INTRAMUSCULAR; INTRAVENOUS ONCE AS NEEDED
Status: DISCONTINUED | OUTPATIENT
Start: 2022-04-27 | End: 2022-04-27

## 2022-04-27 NOTE — OPERATIVE REPORT
[facility]  Operative Report  2022     Levar Medina Patient Status:  Hospital Outpatient Surgery    1945 MRN [id number]   Location [address] [address] Attending No att. providers found   [facility] Day # 0 PCP Abner Orantes MD     Indication: Liya Greco is a 64year old male patient with chronic low back pain failed conservative treatment with medication and physical therapy was here for bilateral sacroiliac joint injection. Preoperative Diagnosis:  Sacroiliitis (HCC) [M46.1]    Postoperative Diagnosis: Same as above. Procedure performed: Bilateral sacroiliac joint arthrogram and injection under fluoroscopy. Anesthesia: Local.    EBL: Less than 1 ml. Procedure Description:   After reviewing the patient's history and performing a focused physical examination, the diagnosis was confirmed and contraindications such as infection and coagulopathy were ruled out. Following review of potential side effects and complications, including but not necessarily limited to infection, allergic reaction, local tissue breakdown, nerve injury, and paresis, the patient indicated they understood and agreed to proceed. After obtaining the informed consent, the patient was brought to the procedure room and monitored. The vital signs were monitored and recorded by an experienced RN. In the prone position, the patient's lumbar and sacral areas were prepped and draped in sterile fashion. The subcutaneous lidocaine was instilled into the superficial soft tissues for local anesthesia. Under fluoroscopic guidance, the anterior and posterior aspects of the sacroiliac joint were overlapped. A 22-gauge, Quincke spinal needle was advanced into the middle portion of the first sacroiliac joint. After the needle  positions were confirmed by AP and lateral views of fluoroscopy, 1 cc Omnipaque-240 was injected into the sacroiliac joint. There was a nice sacroiliac joint arthrogram revealed.  After that methylprednisolone 40 mg with 2 cc of 1% lidocaine was injected. The needle was flushed with 1 cc of 1% lidocaine. Another 22-gauge, Quincke spinal needle was advanced into the middle portion of the first sacroiliac joint. After the needle  positions were confirmed by AP and lateral views of fluoroscopy, 1 cc Omnipaque-240 was injected into the sacroiliac joint. There was a nice sacroiliac joint arthrogram revealed. After that methylprednisolone 40 mg with 2 cc of 1% lidocaine was injected. The needle was flushed with 1 cc of 1% lidocaine. The needles were removed with stylet in situ. The patient tolerated the procedure very well. There was no subjective or objective loss of motor strength. The patient was observed until discharge criteria met. Discharge instructions were given and patient was released to a responsible adult. Complications: None. Follow up: The patient was followed in the pain clinic as needed basis.       Tono Prado MD

## 2022-05-17 ENCOUNTER — OFFICE VISIT (OUTPATIENT)
Dept: PAIN CLINIC | Facility: CLINIC | Age: 77
End: 2022-05-17
Payer: MEDICARE

## 2022-05-17 VITALS
BODY MASS INDEX: 22 KG/M2 | WEIGHT: 163 LBS | HEART RATE: 54 BPM | SYSTOLIC BLOOD PRESSURE: 120 MMHG | OXYGEN SATURATION: 99 % | DIASTOLIC BLOOD PRESSURE: 64 MMHG

## 2022-05-17 DIAGNOSIS — M54.16 LUMBAR RADICULITIS: Primary | ICD-10-CM

## 2022-05-17 PROCEDURE — 99214 OFFICE O/P EST MOD 30 MIN: CPT | Performed by: ANESTHESIOLOGY

## 2022-05-17 PROCEDURE — 3078F DIAST BP <80 MM HG: CPT | Performed by: ANESTHESIOLOGY

## 2022-05-17 PROCEDURE — 3074F SYST BP LT 130 MM HG: CPT | Performed by: ANESTHESIOLOGY

## 2022-05-17 RX ORDER — IPRATROPIUM BROMIDE 21 UG/1
2 SPRAY, METERED NASAL 3 TIMES DAILY
COMMUNITY
Start: 2022-04-13

## 2022-05-17 NOTE — PROGRESS NOTES
Last procedure: BILATERAL SACROILIAC JOINT ARTHROGRAM AND INJECTION UNDER FLUOROSCOPY WITH LOCAL ANESTHESIA  Date: 04/27/22  Percentage of relief obtained: 0   Duration of relief: NA    Current Pain Score: 3

## 2022-05-19 ENCOUNTER — TELEPHONE (OUTPATIENT)
Dept: PAIN CLINIC | Facility: CLINIC | Age: 77
End: 2022-05-19

## 2022-05-19 DIAGNOSIS — M54.16 LUMBAR RADICULOPATHY: Primary | ICD-10-CM

## 2022-05-19 NOTE — TELEPHONE ENCOUNTER
Question Answer Comment   Anesthesia Type Sedation    Provider [facility]    Location Lab    Procedure Lumbar SURESH    Implants No    Medical clearance requested (will send to Pain Navigator) Yes Plavix   Patient has Medicare coverage? No    Comments (Please list entire procedure name here.) lumbar epidural steroid injection at L3-L4 level under fluoroscopy with intravenous sedation.

## 2022-05-19 NOTE — TELEPHONE ENCOUNTER
22  RE: Marlyn Beaumont    : 1945    Dear Dr. Linda Lee    Your patient is being scheduled for a pain management procedure at [facility] within the next 4 weeks. Procedure:  Lumbar SURESH  Physician: Reece Rodriguez- Anesthesiologist     Your patient is currently taking Plavix. Reece Rodriguez usually recommends the medication be held for 7 days prior to injection. Please verify patient is cleared to proceed with pain management procedures.       Clearance Approved   ____________    Clearance Denied       ____________      Comments: ______________________________________________________    Signature: ________________________________  Date: _________________

## 2022-05-20 ENCOUNTER — TELEPHONE (OUTPATIENT)
Dept: NEUROLOGY | Facility: CLINIC | Age: 77
End: 2022-05-20

## 2022-05-20 NOTE — TELEPHONE ENCOUNTER
Prior authorization request completed for: AVI  Authorization # [id number]  Authorization dates: 5/20/2022 - 6/20/2022  CPT codes approved: 30931  Number of visits/dates of service approved: 1  Physician: Dr. Kobe Herrera  Location: [facility]     Patient can be scheduled. Routed to Navigator.

## 2022-05-20 NOTE — TELEPHONE ENCOUNTER
Prior authorization request completed for: AVI  Authorization # [id number]  Authorization dates: 5/20/2022 - 6/20/2022  CPT codes approved: 48762  Number of visits/dates of service approved: 1  Physician: Dr. James Vega  Location: [facility]  Patient can be scheduled. Routed to Navigator.

## 2022-05-25 ENCOUNTER — APPOINTMENT (OUTPATIENT)
Dept: GENERAL RADIOLOGY | Facility: HOSPITAL | Age: 77
End: 2022-05-25
Attending: ANESTHESIOLOGY
Payer: MEDICARE

## 2022-05-25 ENCOUNTER — HOSPITAL ENCOUNTER (OUTPATIENT)
Facility: HOSPITAL | Age: 77
Setting detail: HOSPITAL OUTPATIENT SURGERY
Discharge: HOME | End: 2022-05-25
Attending: ANESTHESIOLOGY | Admitting: ANESTHESIOLOGY
Payer: MEDICARE

## 2022-05-25 VITALS
HEART RATE: 64 BPM | SYSTOLIC BLOOD PRESSURE: 121 MMHG | RESPIRATION RATE: 20 BRPM | DIASTOLIC BLOOD PRESSURE: 65 MMHG | TEMPERATURE: 98 F | OXYGEN SATURATION: 96 %

## 2022-05-25 DIAGNOSIS — M54.16 LUMBAR RADICULOPATHY: ICD-10-CM

## 2022-05-25 PROCEDURE — 3E0R33Z INTRODUCTION OF ANTI-INFLAMMATORY INTO SPINAL CANAL, PERCUTANEOUS APPROACH: ICD-10-PCS | Performed by: ANESTHESIOLOGY

## 2022-05-25 PROCEDURE — 3E0R3BZ INTRODUCTION OF ANESTHETIC AGENT INTO SPINAL CANAL, PERCUTANEOUS APPROACH: ICD-10-PCS | Performed by: ANESTHESIOLOGY

## 2022-05-25 PROCEDURE — 62323 NJX INTERLAMINAR LMBR/SAC: CPT | Performed by: ANESTHESIOLOGY

## 2022-05-25 RX ORDER — SODIUM CHLORIDE 9 MG/ML
INJECTION INTRAVENOUS
Status: DISCONTINUED | OUTPATIENT
Start: 2022-05-25 | End: 2022-05-25

## 2022-05-25 RX ORDER — DIPHENHYDRAMINE HYDROCHLORIDE 50 MG/ML
50 INJECTION INTRAMUSCULAR; INTRAVENOUS ONCE AS NEEDED
Status: DISCONTINUED | OUTPATIENT
Start: 2022-05-25 | End: 2022-05-25

## 2022-05-25 RX ORDER — ONDANSETRON 2 MG/ML
4 INJECTION INTRAMUSCULAR; INTRAVENOUS ONCE AS NEEDED
Status: DISCONTINUED | OUTPATIENT
Start: 2022-05-25 | End: 2022-05-25

## 2022-05-25 RX ORDER — SODIUM CHLORIDE, SODIUM LACTATE, POTASSIUM CHLORIDE, CALCIUM CHLORIDE 600; 310; 30; 20 MG/100ML; MG/100ML; MG/100ML; MG/100ML
100 INJECTION, SOLUTION INTRAVENOUS CONTINUOUS
Status: DISCONTINUED | OUTPATIENT
Start: 2022-05-25 | End: 2022-05-25

## 2022-05-25 RX ORDER — DEXAMETHASONE SODIUM PHOSPHATE 10 MG/ML
INJECTION, SOLUTION INTRAMUSCULAR; INTRAVENOUS
Status: DISCONTINUED | OUTPATIENT
Start: 2022-05-25 | End: 2022-05-25

## 2022-05-25 RX ORDER — MIDAZOLAM HYDROCHLORIDE 1 MG/ML
INJECTION INTRAMUSCULAR; INTRAVENOUS
Status: DISCONTINUED | OUTPATIENT
Start: 2022-05-25 | End: 2022-05-25

## 2022-05-25 RX ORDER — LIDOCAINE HYDROCHLORIDE 10 MG/ML
INJECTION, SOLUTION EPIDURAL; INFILTRATION; INTRACAUDAL; PERINEURAL
Status: DISCONTINUED | OUTPATIENT
Start: 2022-05-25 | End: 2022-05-25

## 2022-05-25 NOTE — OPERATIVE REPORT
[facility]  Operative Report  2022     Marca Cheadle Patient Status:  Hospital Outpatient Surgery    1945 MRN [id number]   Location [address] [address] Attending Kenisha Box MD   Hosp Day # 0 PCP Xi Chavez MD     Indication: Emma Centeno is a 64year old male patient with chronic low back pain failed conservative treatment with medication and physical therapy was here for lumbar epidural steroid injection. Preoperative Diagnosis:  Lumbar radiculopathy [M54.16]    Postoperative Diagnosis: Same as above. Procedure performed: Lumbar Interlaminar Epidural Steroid Injection at L3-L4 level under fluoroscopy. Anesthesia: Local and IV Sedation. EBL: Less than 1 ml. Procedure Description:  After reviewing the patient's history and performing a focused physical examination, the diagnosis and positive previous diagnostic tests were confirmed and contraindications such as infection and coagulopathy were ruled out. Following review of allergies and potential side effects and complications, including but not necessarily limited to infection, allergic reaction, local tissue breakdown, nerve injury, post-dural puncture headache and paresis, the patient consented for the procedure. The patient was brought to the procedure room in prone position. After comprehensive monitors were applied and IV access in the patient's arm, moderate intravenous sedation was given with versed and fentanyl. Moderate sedation was given for 9 minutes. L3-L4 interspace was identified with the help of fluoroscopy. Local anesthetic was given by a 25 gauge 1.5 inch needle with 1% lidocaine in that space level. Thereafter, a 20 gauge Tuohy needle was introduced and advanced under fluoroscopy. The epidural space was accessed by using loss of resistance to air technique. The needle position was confirmed with AP and lateral view under fluoroscopy. Omnipaque 180 was injected in 1 mL volume.  A good epidurogram was obtained. Thereafter, dexamethasone 10 mg with normal saline of 5 mL in total volume of 6 mL was injected through the Tuohy needle. The needle was flushed with 1 mL lidocaine. The needle was withdrawn with the stylet intact in situ. The needle's tip was intact. The patient tolerated the procedure very well without significant immediate complication. The patient's back was cleaned and sterile dressing was applied. The patient was discharged with an instruction to a responsible adult after discharge criteria were met. The patient was advised to contact us should any problems happen. The patient was also informed to go to the emergency room immediately if experiencing severe numbness or weakness in the extremities or experiencing bowel or bladder incontinence. Complications: None. Follow up: The patient was followed in the pain clinic as needed basis.           Ninoska Gage MD

## 2022-06-13 ENCOUNTER — OFFICE VISIT (OUTPATIENT)
Dept: PAIN CLINIC | Facility: CLINIC | Age: 77
End: 2022-06-13
Payer: MEDICARE

## 2022-06-13 VITALS
SYSTOLIC BLOOD PRESSURE: 108 MMHG | HEART RATE: 60 BPM | BODY MASS INDEX: 22 KG/M2 | OXYGEN SATURATION: 98 % | DIASTOLIC BLOOD PRESSURE: 70 MMHG | WEIGHT: 161 LBS

## 2022-06-13 DIAGNOSIS — M47.816 LUMBAR FACET ARTHROPATHY: Primary | ICD-10-CM

## 2022-06-13 DIAGNOSIS — M41.86 DEXTROSCOLIOSIS OF LUMBAR SPINE: ICD-10-CM

## 2022-06-13 DIAGNOSIS — M48.061 SPINAL STENOSIS OF LUMBAR REGION WITHOUT NEUROGENIC CLAUDICATION: ICD-10-CM

## 2022-06-13 PROCEDURE — 3078F DIAST BP <80 MM HG: CPT | Performed by: NURSE PRACTITIONER

## 2022-06-13 PROCEDURE — 99214 OFFICE O/P EST MOD 30 MIN: CPT | Performed by: NURSE PRACTITIONER

## 2022-06-13 PROCEDURE — 3074F SYST BP LT 130 MM HG: CPT | Performed by: NURSE PRACTITIONER

## 2022-06-13 NOTE — PROGRESS NOTES
Last procedure: BILATERAL SACROILIAC JOINT ARTHROGRAM AND INJECTION UNDER FLUOROSCOPY WITH LOCAL ANESTHESIA  Date: 04/27/22  Percentage of relief obtained: 0  Duration of relief: na    Current Pain Score: 0 while sitting              Last procedure: LUMBAR EPIDURAL STEROID INJECTION AT L3-L4 LEVEL UNDER FLUOROSCOPY WITH INTRAVENOUS SEDATION  Date: 05/25/22  Percentage of relief obtained: 0  Duration of relief: na    Current Pain Score: 0 while sitting but terrible if he bends over

## 2022-06-14 ENCOUNTER — TELEPHONE (OUTPATIENT)
Dept: PAIN CLINIC | Facility: CLINIC | Age: 77
End: 2022-06-14

## 2022-06-14 DIAGNOSIS — M47.816 LUMBAR FACET ARTHROPATHY: Primary | ICD-10-CM

## 2022-06-14 NOTE — TELEPHONE ENCOUNTER
Spoke with patient today after speaking with Dr. Charity Silva re: plan and he agrees that MBB at L2/3, /\L3/4 can be tried to help reduce back pain/reviewed lumbar MRI/DDD and endplate changes to the vertebral bodies and may eventually require surgical intervention. All patient and wife's questions answered and will proceed placing request for authorization for left median branch blocks at L2-3, L3-4 with IV sedation followed by right median branch blocks at L2-3 L3-4 with IV sedation with Dr. Georgina Reza. Patient was informed that he needs to perform provocative actions in order to report back at his follow-up visit after both sides of median branch blocks have been addressed.       Patient also requested that I send this note to his primary care provider because he needs a referral for him to return and continue visits with us through August as his current referral is expiring at the end of June

## 2022-06-16 ENCOUNTER — TELEPHONE (OUTPATIENT)
Dept: NEUROLOGY | Facility: CLINIC | Age: 77
End: 2022-06-16

## 2022-06-16 DIAGNOSIS — M47.816 LUMBAR FACET ARTHROPATHY: Primary | ICD-10-CM

## 2022-06-16 NOTE — TELEPHONE ENCOUNTER
Initiated PA with [employer] for left MBB L2-L3, L3-L4 [id number], [id number] / Uploaded clinical documentation / Tracking # [id number]

## 2022-06-27 ENCOUNTER — TELEPHONE (OUTPATIENT)
Dept: PAIN CLINIC | Facility: CLINIC | Age: 77
End: 2022-06-27

## 2022-06-27 NOTE — TELEPHONE ENCOUNTER
Pt called requested why [facility] did not place referral for procedure. PSR asked pt to clarify is he was calling to check status on an injection order. Pt stated yes '' I see what kristina says pending.'' PSR advised pt order was in fact placed and we are pending the authorization, meaning we have not gotten a response back. Pt stated ''well why not?'' Advised pt PSR can not advise on what the delay is as we are at the mercy of the insurance company. Pt stated I want to hear this from Yomaiar Mcclain so have her call me. PSR attempted to advised that ELVIN is not a part of the PA dept. Pt insisted on getting a call from ELVIN Greer.

## 2022-06-27 NOTE — TELEPHONE ENCOUNTER
Withdrew previous case due to no pain relief from previous branch blocks.  Submitted PA for L2-L3 MBB [id number] / Tracking # [id number]

## 2022-06-27 NOTE — TELEPHONE ENCOUNTER
Contacted the patient to let him know where we stand regarding insurance approval.  Informed him that I did request that they complete the submission but we are addressing the left L2-3 and L3-4 facet joints to relieve his left low back pain. Patient states that his pain is in his back he has no radiating leg symptoms it is worse when he bends and when he sitting too long he does realize he has severe spinal stenosis and is trying to get through his upcoming trip to [facility] in August.  Discussed with patient that if insurance does not allow us to move forward with the facet injections we can make sure that he has Medrol Dosepak to take with him on his trip and that he would need to see the spine surgeons when he returns for surgical consultation.   Patient indicated understanding and agrees with this plan he will wait for another week to see if he hears from scheduling and if not he will be contacting this provider again to place the order for the Medrol Dosepak to take with her medication and a neurosurgery consultation

## 2022-06-30 NOTE — TELEPHONE ENCOUNTER
Received approval for L2-L3 MBB and we can proceed with bilateral or one side. Due to [facility]'s changes in policy, I am not sure they will approve left side and then right side separately.

## 2022-07-01 NOTE — TELEPHONE ENCOUNTER
Prior authorization request completed for: Bilateral L2-L3 facet injection    Authorization # [id number]  Authorization dates: 6/30/2022 - 9/28/2022  CPT codes approved: 39647  Number of visits/dates of service approved: 1  Physician: Dr. Estella Harris   Location: THE [facility]     Patient can be scheduled. Routed to Navigator.

## 2022-07-05 ENCOUNTER — TELEPHONE (OUTPATIENT)
Dept: PAIN CLINIC | Facility: CLINIC | Age: 77
End: 2022-07-05

## 2022-07-05 NOTE — TELEPHONE ENCOUNTER
Pt called requesting a call back from ELVIN Greer. Pt stated he is having an upcoming surgery and would like to speak to her. Pt declined to provide further information.     Please advise

## 2022-07-06 NOTE — TELEPHONE ENCOUNTER
Contacted pt to gather addt'l info. Pt is asking if he will rcv sedation for his upcoming procedure. Advised pt it is scheduled to be done with IV sedation, which will be twilight sedation. Pt VU and states that's the only question he had.

## 2022-07-11 ENCOUNTER — HOSPITAL ENCOUNTER (OUTPATIENT)
Facility: HOSPITAL | Age: 77
Setting detail: HOSPITAL OUTPATIENT SURGERY
Discharge: HOME | End: 2022-07-11
Attending: ANESTHESIOLOGY | Admitting: ANESTHESIOLOGY
Payer: MEDICARE

## 2022-07-11 ENCOUNTER — APPOINTMENT (OUTPATIENT)
Dept: GENERAL RADIOLOGY | Facility: HOSPITAL | Age: 77
End: 2022-07-11
Attending: ANESTHESIOLOGY
Payer: MEDICARE

## 2022-07-11 VITALS
TEMPERATURE: 98 F | SYSTOLIC BLOOD PRESSURE: 125 MMHG | DIASTOLIC BLOOD PRESSURE: 65 MMHG | HEIGHT: 72 IN | BODY MASS INDEX: 21.81 KG/M2 | HEART RATE: 54 BPM | WEIGHT: 161 LBS | RESPIRATION RATE: 18 BRPM | OXYGEN SATURATION: 96 %

## 2022-07-11 DIAGNOSIS — M47.816 LUMBAR FACET ARTHROPATHY: ICD-10-CM

## 2022-07-11 PROCEDURE — BR16ZZZ FLUOROSCOPY OF LUMBAR FACET JOINT(S): ICD-10-PCS | Performed by: ANESTHESIOLOGY

## 2022-07-11 PROCEDURE — 3E0T3BZ INTRODUCTION OF ANESTHETIC AGENT INTO PERIPHERAL NERVES AND PLEXI, PERCUTANEOUS APPROACH: ICD-10-PCS | Performed by: ANESTHESIOLOGY

## 2022-07-11 PROCEDURE — 99152 MOD SED SAME PHYS/QHP 5/>YRS: CPT | Performed by: ANESTHESIOLOGY

## 2022-07-11 RX ORDER — ONDANSETRON 2 MG/ML
4 INJECTION INTRAMUSCULAR; INTRAVENOUS ONCE AS NEEDED
Status: DISCONTINUED | OUTPATIENT
Start: 2022-07-11 | End: 2022-07-11

## 2022-07-11 RX ORDER — METHYLPREDNISOLONE ACETATE 40 MG/ML
INJECTION, SUSPENSION INTRA-ARTICULAR; INTRALESIONAL; INTRAMUSCULAR; SOFT TISSUE
Status: DISCONTINUED | OUTPATIENT
Start: 2022-07-11 | End: 2022-07-11

## 2022-07-11 RX ORDER — DIPHENHYDRAMINE HYDROCHLORIDE 50 MG/ML
50 INJECTION INTRAMUSCULAR; INTRAVENOUS ONCE AS NEEDED
Status: DISCONTINUED | OUTPATIENT
Start: 2022-07-11 | End: 2022-07-11

## 2022-07-11 RX ORDER — LIDOCAINE HYDROCHLORIDE 10 MG/ML
INJECTION, SOLUTION INFILTRATION; PERINEURAL
Status: DISCONTINUED | OUTPATIENT
Start: 2022-07-11 | End: 2022-07-11

## 2022-07-11 RX ORDER — MIDAZOLAM HYDROCHLORIDE 1 MG/ML
INJECTION INTRAMUSCULAR; INTRAVENOUS
Status: DISCONTINUED | OUTPATIENT
Start: 2022-07-11 | End: 2022-07-11

## 2022-07-11 RX ORDER — SODIUM CHLORIDE, SODIUM LACTATE, POTASSIUM CHLORIDE, CALCIUM CHLORIDE 600; 310; 30; 20 MG/100ML; MG/100ML; MG/100ML; MG/100ML
100 INJECTION, SOLUTION INTRAVENOUS CONTINUOUS
Status: DISCONTINUED | OUTPATIENT
Start: 2022-07-11 | End: 2022-07-11

## 2022-07-11 NOTE — OPERATIVE REPORT
[facility]  Operative Report  2022     Reina You Patient Status:  Hospital Outpatient Surgery    1945 MRN [id number]   Location [address] [address] Attending Abner Moore MD   Hosp Day # 0 PCP Jeremy Jeans, MD     Indication: Kelvin Barrientos is a 76year old male patient with chronic low back pain failed conservative treatment with medication and physical therapy was here for a bilateral lumbar medial branch block for the preparation of radiofrequency ablation for long-term pain relief. Preoperative Diagnosis:  Lumbar facet arthropathy [M47.816]    Postoperative Diagnosis: Same as above. Procedure performed: Bilateral lumbar medial branch block at L2-L3 and L3-L4 level under fluoroscopy. Anesthesia: Local and IV Sedation. EBL: Less than 1 ml. Procedure Description:  After reviewing the patient's history and performing a focused physical examination, the diagnosis was confirmed and contraindications such as infection and coagulopathy were ruled out. Following review of allergy,  potential side effects and complications, including but not necessarily limited to infection, allergic reaction, local tissue breakdown, nerve injury, and paresis, the patient indicated they understood and agreed to proceed. After obtaining the informed consent, the patient was brought to the procedure room and monitored. Per my order and under my supervision, the patient was sedated with intermittent intravenous medications such as fentanyl and versed. The vital signs were monitored and recorded by an experienced RN. The procedure started after the patient was adequately sedated. Moderate conscious intravenous sedation was provided for 10 minutes. The patient was placed prone on the table and the back was prepped and draped in a sterile fashion. The skin and subcutaneous tissue was anesthetized via 25-gauge 1.5\" needle with approximately 2 mL of 1% lidocaine.   Under fluoroscopy guidance, a 22-gauge 3.5\" Quincke spinal needle was introduced and advanced along the superior margin of the right L2 and L3 transverse process and lateral to the right L2 and L3 superior articular process, and it was directed inferiorly and medially so that the tip struck the superior aspect of the junction of the transverse process and the superior articular process. Another 22-gauge 3.5\" Quincke spinal needle was introduced and advanced along the superior margin of the right sacral lalitha and lateral to the right S1 superior articular process, and it was directed inferiorly and medially so that the tip struck the superior aspect of the junction of the sacral lalitha and the superior articular process. Following negative aspiration for CSF and blood, approximately 1 mL of 1% Lidocaine was injected via each needle without complication. Another 22-gauge 3.5\" Quincke spinal needle was introduced and advanced along the superior margin of the left L2 and L3 transverse process and lateral to the left L2 and L3 superior articular process, and it was directed inferiorly and medially so that the tip struck the superior aspect of the junction of the transverse process and the superior articular process. Another 22-gauge 3.5\" Quincke spinal needle was introduced and advanced along the superior margin of the left sacral lalitha and lateral to the left S1 superior articular process, and it was directed inferiorly and medially so that the tip struck the superior aspect of the junction of the sacral lalitha and the superior articular process. Following negative aspiration for CSF and blood, approximately 1 mL of 1% Lidocaine was injected via each needle without complication. The needles were removed with tips intact. The patient tolerated procedure very well. No complications were observed during or immediately after the procedure. The patient was observed until discharge criteria met.   Discharge instructions were given and patient was released to a responsible adult. Complications: None. Follow up: The patient was followed in the pain clinic as needed basis.     Garima Mcguire MD

## 2022-07-20 ENCOUNTER — OFFICE VISIT (OUTPATIENT)
Dept: FAMILY MEDICINE CLINIC | Facility: CLINIC | Age: 77
End: 2022-07-20
Payer: MEDICARE

## 2022-07-20 ENCOUNTER — LAB ENCOUNTER (OUTPATIENT)
Dept: LAB | Age: 77
End: 2022-07-20
Attending: FAMILY MEDICINE
Payer: MEDICARE

## 2022-07-20 VITALS
DIASTOLIC BLOOD PRESSURE: 68 MMHG | WEIGHT: 162.63 LBS | BODY MASS INDEX: 22.03 KG/M2 | SYSTOLIC BLOOD PRESSURE: 142 MMHG | HEIGHT: 72 IN

## 2022-07-20 DIAGNOSIS — D75.89 MACROCYTOSIS: Primary | ICD-10-CM

## 2022-07-20 DIAGNOSIS — E78.00 HYPERCHOLESTEROLEMIA: ICD-10-CM

## 2022-07-20 DIAGNOSIS — Z00.00 ENCOUNTER FOR ANNUAL HEALTH EXAMINATION: Primary | ICD-10-CM

## 2022-07-20 DIAGNOSIS — D75.89 MACROCYTOSIS: ICD-10-CM

## 2022-07-20 DIAGNOSIS — R09.82 POST-NASAL DRAINAGE: ICD-10-CM

## 2022-07-20 DIAGNOSIS — I10 ESSENTIAL HYPERTENSION: ICD-10-CM

## 2022-07-20 DIAGNOSIS — I77.819 AORTIC ECTASIA (HCC): ICD-10-CM

## 2022-07-20 DIAGNOSIS — H40.001 GLAUCOMA SUSPECT OF RIGHT EYE: ICD-10-CM

## 2022-07-20 DIAGNOSIS — I77.9 CAROTID ARTERY DISEASE, UNSPECIFIED LATERALITY, UNSPECIFIED TYPE (HCC): ICD-10-CM

## 2022-07-20 DIAGNOSIS — N40.2 PROSTATE NODULE: ICD-10-CM

## 2022-07-20 DIAGNOSIS — I70.0 AORTO-ILIAC ATHEROSCLEROSIS (HCC): ICD-10-CM

## 2022-07-20 DIAGNOSIS — Z95.5 HX OF HEART ARTERY STENT: ICD-10-CM

## 2022-07-20 DIAGNOSIS — I70.8 AORTO-ILIAC ATHEROSCLEROSIS (HCC): ICD-10-CM

## 2022-07-20 DIAGNOSIS — Z00.00 ENCOUNTER FOR ANNUAL HEALTH EXAMINATION: ICD-10-CM

## 2022-07-20 DIAGNOSIS — Z13.6 SCREENING FOR CARDIOVASCULAR CONDITION: ICD-10-CM

## 2022-07-20 DIAGNOSIS — Z23 NEED FOR PROPHYLACTIC VACCINATION AGAINST HEPATITIS A: ICD-10-CM

## 2022-07-20 DIAGNOSIS — Z12.11 SCREEN FOR COLON CANCER: ICD-10-CM

## 2022-07-20 PROBLEM — H25.9 AGE-RELATED CATARACT OF BOTH EYES: Status: RESOLVED | Noted: 2021-08-04 | Resolved: 2022-07-20

## 2022-07-20 LAB
ALBUMIN SERPL-MCNC: 3.9 G/DL (ref 3.4–5)
ALBUMIN/GLOB SERPL: 1 {RATIO} (ref 1–2)
ALP LIVER SERPL-CCNC: 62 U/L
ALT SERPL-CCNC: 20 U/L
ANION GAP SERPL CALC-SCNC: 4 MMOL/L (ref 0–18)
AST SERPL-CCNC: 19 U/L (ref 15–37)
BASOPHILS # BLD AUTO: 0.04 X10(3) UL (ref 0–0.2)
BASOPHILS NFR BLD AUTO: 0.6 %
BILIRUB SERPL-MCNC: 0.4 MG/DL (ref 0.1–2)
BUN BLD-MCNC: 32 MG/DL (ref 7–18)
CALCIUM BLD-MCNC: 9.5 MG/DL (ref 8.5–10.1)
CHLORIDE SERPL-SCNC: 109 MMOL/L (ref 98–112)
CHOLEST SERPL-MCNC: 148 MG/DL (ref ?–200)
CO2 SERPL-SCNC: 28 MMOL/L (ref 21–32)
COMPLEXED PSA SERPL-MCNC: 1.12 NG/ML (ref ?–4)
CREAT BLD-MCNC: 1.17 MG/DL
EOSINOPHIL # BLD AUTO: 0.2 X10(3) UL (ref 0–0.7)
EOSINOPHIL NFR BLD AUTO: 3.2 %
ERYTHROCYTE [DISTWIDTH] IN BLOOD BY AUTOMATED COUNT: 12.3 %
FASTING PATIENT LIPID ANSWER: YES
FASTING STATUS PATIENT QL REPORTED: YES
FOLATE SERPL-MCNC: 5.2 NG/ML (ref 8.7–?)
GLOBULIN PLAS-MCNC: 4 G/DL (ref 2.8–4.4)
GLUCOSE BLD-MCNC: 100 MG/DL (ref 70–99)
HCT VFR BLD AUTO: 41.4 %
HDLC SERPL-MCNC: 63 MG/DL (ref 40–59)
HGB BLD-MCNC: 13.1 G/DL
HGB RETIC QN AUTO: 39.6 PG (ref 28.2–36.6)
IMM GRANULOCYTES # BLD AUTO: 0.03 X10(3) UL (ref 0–1)
IMM GRANULOCYTES NFR BLD: 0.5 %
IMM RETICS NFR: 0.08 RATIO (ref 0.1–0.3)
LDLC SERPL CALC-MCNC: 73 MG/DL (ref ?–100)
LYMPHOCYTES # BLD AUTO: 1.31 X10(3) UL (ref 1–4)
LYMPHOCYTES NFR BLD AUTO: 21.2 %
MCH RBC QN AUTO: 33.1 PG (ref 26–34)
MCHC RBC AUTO-ENTMCNC: 31.6 G/DL (ref 31–37)
MCV RBC AUTO: 104.5 FL
MONOCYTES # BLD AUTO: 0.98 X10(3) UL (ref 0.1–1)
MONOCYTES NFR BLD AUTO: 15.8 %
NEUTROPHILS # BLD AUTO: 3.63 X10 (3) UL (ref 1.5–7.7)
NEUTROPHILS # BLD AUTO: 3.63 X10(3) UL (ref 1.5–7.7)
NEUTROPHILS NFR BLD AUTO: 58.7 %
NONHDLC SERPL-MCNC: 85 MG/DL (ref ?–130)
OSMOLALITY SERPL CALC.SUM OF ELEC: 299 MOSM/KG (ref 275–295)
PLATELET # BLD AUTO: 225 10(3)UL (ref 150–450)
POTASSIUM SERPL-SCNC: 4.2 MMOL/L (ref 3.5–5.1)
PROT SERPL-MCNC: 7.9 G/DL (ref 6.4–8.2)
RBC # BLD AUTO: 3.96 X10(6)UL
RETICS # AUTO: 56.8 X10(3) UL (ref 22.5–147.5)
RETICS/RBC NFR AUTO: 1.5 %
SODIUM SERPL-SCNC: 141 MMOL/L (ref 136–145)
TRIGL SERPL-MCNC: 59 MG/DL (ref 30–149)
TSI SER-ACNC: 2.57 MIU/ML (ref 0.36–3.74)
VIT B12 SERPL-MCNC: 409 PG/ML (ref 193–986)
VLDLC SERPL CALC-MCNC: 9 MG/DL (ref 0–30)
WBC # BLD AUTO: 6.2 X10(3) UL (ref 4–11)

## 2022-07-20 PROCEDURE — 3008F BODY MASS INDEX DOCD: CPT | Performed by: FAMILY MEDICINE

## 2022-07-20 PROCEDURE — 1125F AMNT PAIN NOTED PAIN PRSNT: CPT | Performed by: FAMILY MEDICINE

## 2022-07-20 PROCEDURE — G0439 PPPS, SUBSEQ VISIT: HCPCS | Performed by: FAMILY MEDICINE

## 2022-07-20 PROCEDURE — 3077F SYST BP >= 140 MM HG: CPT | Performed by: FAMILY MEDICINE

## 2022-07-20 PROCEDURE — 36415 COLL VENOUS BLD VENIPUNCTURE: CPT

## 2022-07-20 PROCEDURE — 85025 COMPLETE CBC W/AUTO DIFF WBC: CPT

## 2022-07-20 PROCEDURE — 84443 ASSAY THYROID STIM HORMONE: CPT

## 2022-07-20 PROCEDURE — 82607 VITAMIN B-12: CPT

## 2022-07-20 PROCEDURE — 90632 HEPA VACCINE ADULT IM: CPT | Performed by: FAMILY MEDICINE

## 2022-07-20 PROCEDURE — 96160 PT-FOCUSED HLTH RISK ASSMT: CPT | Performed by: FAMILY MEDICINE

## 2022-07-20 PROCEDURE — 80053 COMPREHEN METABOLIC PANEL: CPT

## 2022-07-20 PROCEDURE — 99397 PER PM REEVAL EST PAT 65+ YR: CPT | Performed by: FAMILY MEDICINE

## 2022-07-20 PROCEDURE — 85045 AUTOMATED RETICULOCYTE COUNT: CPT

## 2022-07-20 PROCEDURE — 82746 ASSAY OF FOLIC ACID SERUM: CPT

## 2022-07-20 PROCEDURE — 80061 LIPID PANEL: CPT

## 2022-07-20 PROCEDURE — 90471 IMMUNIZATION ADMIN: CPT | Performed by: FAMILY MEDICINE

## 2022-07-20 PROCEDURE — 3078F DIAST BP <80 MM HG: CPT | Performed by: FAMILY MEDICINE

## 2022-07-20 RX ORDER — LOSARTAN POTASSIUM 50 MG/1
50 TABLET ORAL DAILY
Qty: 90 TABLET | Refills: 3 | Status: SHIPPED | OUTPATIENT
Start: 2022-07-20

## 2022-07-20 RX ORDER — SIMVASTATIN 20 MG
TABLET ORAL
Qty: 90 TABLET | Refills: 3 | Status: SHIPPED | OUTPATIENT
Start: 2022-07-20

## 2022-07-20 RX ORDER — MONTELUKAST SODIUM 10 MG/1
10 TABLET ORAL NIGHTLY
Qty: 30 TABLET | Refills: 1 | Status: SHIPPED | OUTPATIENT
Start: 2022-07-20

## 2022-07-20 RX ORDER — OMEPRAZOLE 20 MG/1
1 TABLET, DELAYED RELEASE ORAL DAILY
COMMUNITY
Start: 2021-10-22 | End: 2022-07-20

## 2022-07-20 RX ORDER — MONTELUKAST SODIUM 10 MG/1
TABLET ORAL
Qty: 90 TABLET | Refills: 0 | OUTPATIENT
Start: 2022-07-20

## 2022-07-20 RX ORDER — OMEPRAZOLE 20 MG/1
20 TABLET, DELAYED RELEASE ORAL DAILY
Qty: 90 TABLET | Refills: 3 | Status: SHIPPED | OUTPATIENT
Start: 2022-07-20

## 2022-07-20 RX ORDER — METOPROLOL SUCCINATE 25 MG/1
25 TABLET, EXTENDED RELEASE ORAL DAILY
Qty: 90 TABLET | Refills: 3 | Status: SHIPPED | OUTPATIENT
Start: 2022-07-20

## 2022-07-25 ENCOUNTER — OFFICE VISIT (OUTPATIENT)
Dept: PAIN CLINIC | Facility: CLINIC | Age: 77
End: 2022-07-25
Payer: MEDICARE

## 2022-07-25 VITALS — HEART RATE: 50 BPM | OXYGEN SATURATION: 98 % | DIASTOLIC BLOOD PRESSURE: 70 MMHG | SYSTOLIC BLOOD PRESSURE: 110 MMHG

## 2022-07-25 DIAGNOSIS — M48.061 SPINAL STENOSIS OF LUMBAR REGION WITHOUT NEUROGENIC CLAUDICATION: Primary | ICD-10-CM

## 2022-07-25 PROCEDURE — 3074F SYST BP LT 130 MM HG: CPT | Performed by: NURSE PRACTITIONER

## 2022-07-25 PROCEDURE — 99214 OFFICE O/P EST MOD 30 MIN: CPT | Performed by: NURSE PRACTITIONER

## 2022-07-25 PROCEDURE — 3078F DIAST BP <80 MM HG: CPT | Performed by: NURSE PRACTITIONER

## 2022-07-25 RX ORDER — METHYLPREDNISOLONE 4 MG/1
TABLET ORAL
Qty: 1 EACH | Refills: 0 | Status: SHIPPED | OUTPATIENT
Start: 2022-07-25

## 2022-07-25 NOTE — PROGRESS NOTES
Last procedure: BILATERAL LUMBAR MEDIAL BRANCH BLOCK AT L2-L3 AND L3-L4 LEVEL UNDER FLUOROSCOPY WITH INTRAVENOUS SEDATION  Date: 07/11/22  Percentage of relief obtained: 5%  Duration of relief: current     Current Pain Score: 9/10

## 2022-08-01 ENCOUNTER — PATIENT MESSAGE (OUTPATIENT)
Dept: FAMILY MEDICINE CLINIC | Facility: CLINIC | Age: 77
End: 2022-08-01

## 2022-08-01 ENCOUNTER — PATIENT MESSAGE (OUTPATIENT)
Dept: PAIN CLINIC | Facility: CLINIC | Age: 77
End: 2022-08-01

## 2022-08-01 RX ORDER — FOLIC ACID 1 MG/1
1 TABLET ORAL DAILY
Qty: 30 TABLET | Refills: 0 | Status: SHIPPED | OUTPATIENT
Start: 2022-08-01

## 2022-08-01 NOTE — TELEPHONE ENCOUNTER
From: Luz Maria Griffin  To: Daren Stewart MD  Sent: 8/1/2022 11:32 AM CDT  Subject: Penelope Quezada test results    [facility] does not have 1mg Folic Acid OTC. Need script for same at [place] [address].  Thanks,   Sam Ellsworth

## 2022-08-02 RX ORDER — FOLIC ACID 1 MG/1
1 TABLET ORAL DAILY
Qty: 90 TABLET | Refills: 0 | Status: SHIPPED | OUTPATIENT
Start: 2022-08-02

## 2022-09-13 ENCOUNTER — OFFICE VISIT (OUTPATIENT)
Dept: SURGERY | Facility: CLINIC | Age: 77
End: 2022-09-13
Payer: MEDICARE

## 2022-09-13 VITALS
HEIGHT: 73 IN | HEART RATE: 68 BPM | SYSTOLIC BLOOD PRESSURE: 102 MMHG | DIASTOLIC BLOOD PRESSURE: 72 MMHG | WEIGHT: 161 LBS | BODY MASS INDEX: 21.34 KG/M2

## 2022-09-13 DIAGNOSIS — G89.29 CHRONIC MIDLINE LOW BACK PAIN WITHOUT SCIATICA: ICD-10-CM

## 2022-09-13 DIAGNOSIS — M47.816 LUMBAR SPONDYLOSIS: Primary | ICD-10-CM

## 2022-09-13 DIAGNOSIS — M54.50 CHRONIC MIDLINE LOW BACK PAIN WITHOUT SCIATICA: ICD-10-CM

## 2022-09-13 PROBLEM — H40.023 OPEN ANGLE WITH BORDERLINE FINDINGS AND HIGH GLAUCOMA RISK IN BOTH EYES: Status: ACTIVE | Noted: 2022-03-16

## 2022-09-13 PROCEDURE — 99205 OFFICE O/P NEW HI 60 MIN: CPT | Performed by: NEUROLOGICAL SURGERY

## 2022-09-13 PROCEDURE — 3078F DIAST BP <80 MM HG: CPT | Performed by: NEUROLOGICAL SURGERY

## 2022-09-13 PROCEDURE — 3074F SYST BP LT 130 MM HG: CPT | Performed by: NEUROLOGICAL SURGERY

## 2022-09-13 PROCEDURE — 1126F AMNT PAIN NOTED NONE PRSNT: CPT | Performed by: NEUROLOGICAL SURGERY

## 2022-09-13 PROCEDURE — 3008F BODY MASS INDEX DOCD: CPT | Performed by: NEUROLOGICAL SURGERY

## 2022-09-13 RX ORDER — PREDNISONE 10 MG/1
40 TABLET ORAL DAILY
Qty: 30 TABLET | Refills: 0 | Status: SHIPPED | OUTPATIENT
Start: 2022-09-13

## 2022-09-13 NOTE — PROGRESS NOTES
States he is having VIOLA lower back pain. No n/t in the legs.  Goes up the sides of he's back       Had a injection in pain- stated it did not help him       Pain 0/10 when sitting 8/10 when bending over and when walking       The MEDROL packed helped him

## 2022-09-29 ENCOUNTER — HOSPITAL ENCOUNTER (OUTPATIENT)
Dept: MRI IMAGING | Facility: HOSPITAL | Age: 77
Discharge: HOME | End: 2022-09-29
Attending: PHYSICIAN ASSISTANT
Payer: MEDICARE

## 2022-09-29 DIAGNOSIS — M47.816 LUMBAR SPONDYLOSIS: ICD-10-CM

## 2022-09-29 DIAGNOSIS — M54.50 CHRONIC MIDLINE LOW BACK PAIN WITHOUT SCIATICA: ICD-10-CM

## 2022-09-29 DIAGNOSIS — G89.29 CHRONIC MIDLINE LOW BACK PAIN WITHOUT SCIATICA: ICD-10-CM

## 2022-09-29 PROCEDURE — 72148 MRI LUMBAR SPINE W/O DYE: CPT | Performed by: PHYSICIAN ASSISTANT

## 2022-10-18 ENCOUNTER — TELEPHONE (OUTPATIENT)
Dept: FAMILY MEDICINE CLINIC | Facility: CLINIC | Age: 77
End: 2022-10-18

## 2022-10-18 DIAGNOSIS — E53.8 DEFICIENCY OF FOLIC ACID: Primary | ICD-10-CM

## 2022-10-19 ENCOUNTER — LAB ENCOUNTER (OUTPATIENT)
Dept: LAB | Age: 77
End: 2022-10-19
Attending: FAMILY MEDICINE
Payer: MEDICARE

## 2022-10-19 DIAGNOSIS — E53.8 DEFICIENCY OF FOLIC ACID: ICD-10-CM

## 2022-10-19 LAB — FOLATE SERPL-MCNC: 32 NG/ML (ref 8.7–?)

## 2022-10-19 PROCEDURE — 36415 COLL VENOUS BLD VENIPUNCTURE: CPT

## 2022-10-19 PROCEDURE — 82746 ASSAY OF FOLIC ACID SERUM: CPT

## 2022-10-25 ENCOUNTER — OFFICE VISIT (OUTPATIENT)
Dept: FAMILY MEDICINE CLINIC | Facility: CLINIC | Age: 77
End: 2022-10-25
Payer: MEDICARE

## 2022-10-25 ENCOUNTER — HOSPITAL ENCOUNTER (OUTPATIENT)
Dept: GENERAL RADIOLOGY | Age: 77
Discharge: HOME | End: 2022-10-25
Attending: FAMILY MEDICINE
Payer: MEDICARE

## 2022-10-25 VITALS
BODY MASS INDEX: 22.1 KG/M2 | HEIGHT: 72 IN | RESPIRATION RATE: 14 BRPM | WEIGHT: 163.19 LBS | DIASTOLIC BLOOD PRESSURE: 60 MMHG | TEMPERATURE: 98 F | HEART RATE: 77 BPM | SYSTOLIC BLOOD PRESSURE: 110 MMHG | OXYGEN SATURATION: 95 %

## 2022-10-25 DIAGNOSIS — L82.1 SEBORRHEIC KERATOSIS: ICD-10-CM

## 2022-10-25 DIAGNOSIS — R05.8 COUGH PRODUCTIVE OF PURULENT SPUTUM: ICD-10-CM

## 2022-10-25 DIAGNOSIS — M54.31 BILATERAL SCIATICA: Primary | ICD-10-CM

## 2022-10-25 DIAGNOSIS — M54.32 BILATERAL SCIATICA: Primary | ICD-10-CM

## 2022-10-25 PROCEDURE — 3008F BODY MASS INDEX DOCD: CPT | Performed by: FAMILY MEDICINE

## 2022-10-25 PROCEDURE — 71046 X-RAY EXAM CHEST 2 VIEWS: CPT | Performed by: FAMILY MEDICINE

## 2022-10-25 PROCEDURE — 3074F SYST BP LT 130 MM HG: CPT | Performed by: FAMILY MEDICINE

## 2022-10-25 PROCEDURE — 3078F DIAST BP <80 MM HG: CPT | Performed by: FAMILY MEDICINE

## 2022-10-25 PROCEDURE — 99214 OFFICE O/P EST MOD 30 MIN: CPT | Performed by: FAMILY MEDICINE

## 2022-10-25 RX ORDER — CEPHALEXIN 500 MG/1
500 CAPSULE ORAL 3 TIMES DAILY
Qty: 30 CAPSULE | Refills: 0 | Status: SHIPPED | OUTPATIENT
Start: 2022-10-25 | End: 2022-11-04

## 2022-10-25 RX ORDER — BENZONATATE 100 MG/1
100 CAPSULE ORAL 3 TIMES DAILY PRN
Qty: 20 CAPSULE | Refills: 0 | Status: SHIPPED | OUTPATIENT
Start: 2022-10-25 | End: 2022-10-31

## 2022-10-31 ENCOUNTER — PATIENT MESSAGE (OUTPATIENT)
Dept: FAMILY MEDICINE CLINIC | Facility: CLINIC | Age: 77
End: 2022-10-31

## 2022-10-31 DIAGNOSIS — R05.8 COUGH PRODUCTIVE OF PURULENT SPUTUM: ICD-10-CM

## 2022-10-31 RX ORDER — BENZONATATE 100 MG/1
100 CAPSULE ORAL 3 TIMES DAILY PRN
Qty: 20 CAPSULE | Refills: 0 | Status: SHIPPED | OUTPATIENT
Start: 2022-10-31

## 2022-10-31 NOTE — TELEPHONE ENCOUNTER
From: Marca Cheadle  To: Xi Chavez MD  Sent: 10/31/2022 8:50 AM CDT  Subject: Still coughing and difficulty breathing. Please advise, am out of pills as of today. Slight improvement, but still coughing and producing mucus.   Doreen Jeronimo

## 2022-11-03 NOTE — TELEPHONE ENCOUNTER
From: Padmini Mcclellan  Sent: 11/3/2022 11:12 AM CDT  To: [facility] Clinical Staff  Subject: Still coughing and difficulty breathing. Antibiotics used up. Cough, mucus, wheezing still present. Condition on and off for more than a year. Please advise next step.  Thanks,  Lissette Finch

## 2022-11-03 NOTE — TELEPHONE ENCOUNTER
Dr. Linda Lee, please see patient update from this morning. No findings on chest xray from 10/25/22.

## 2022-11-11 ENCOUNTER — TELEPHONE (OUTPATIENT)
Dept: FAMILY MEDICINE CLINIC | Facility: CLINIC | Age: 77
End: 2022-11-11

## 2022-11-11 NOTE — TELEPHONE ENCOUNTER
Pt said \"he's having trouble breathing\"   Pt refuses to go to ER or IC and wants appt with Dr. Marj Powell.   Pls triage

## 2022-11-11 NOTE — TELEPHONE ENCOUNTER
Spoke with pt he states cough and shortness of breath have not gone away. Pt states Dr Rodolfo Mccabe has been seeing him for this and he would like appt with him. Pt states symptoms have slightly worsened since 10/25/22 appt with Dr Rodolfo Mccabe. Pt states cough productive with phlegm ranging from clear to green. Pt denies any other Sx. Pt states short of breath also but this is not new. Instructed pt no availability today and he should go to Immediate Care for evaluation. Pt states he will not go to ER or Immediate Care for evaluation pt states he has done that before and wants to see Dr Rodolfo Mccabe. Please advise.

## 2022-11-17 ENCOUNTER — OFFICE VISIT (OUTPATIENT)
Dept: FAMILY MEDICINE CLINIC | Facility: CLINIC | Age: 77
End: 2022-11-17
Payer: MEDICARE

## 2022-11-17 VITALS
OXYGEN SATURATION: 95 % | RESPIRATION RATE: 16 BRPM | DIASTOLIC BLOOD PRESSURE: 64 MMHG | HEIGHT: 72 IN | TEMPERATURE: 98 F | BODY MASS INDEX: 21.97 KG/M2 | HEART RATE: 74 BPM | SYSTOLIC BLOOD PRESSURE: 110 MMHG | WEIGHT: 162.19 LBS

## 2022-11-17 DIAGNOSIS — J43.9 PULMONARY EMPHYSEMA, UNSPECIFIED EMPHYSEMA TYPE (HCC): ICD-10-CM

## 2022-11-17 DIAGNOSIS — Z86.010 H/O ADENOMATOUS POLYP OF COLON: ICD-10-CM

## 2022-11-17 DIAGNOSIS — J43.9 PULMONARY EMPHYSEMA, UNSPECIFIED EMPHYSEMA TYPE (HCC): Primary | ICD-10-CM

## 2022-11-17 PROCEDURE — 3074F SYST BP LT 130 MM HG: CPT | Performed by: FAMILY MEDICINE

## 2022-11-17 PROCEDURE — 3078F DIAST BP <80 MM HG: CPT | Performed by: FAMILY MEDICINE

## 2022-11-17 PROCEDURE — 3008F BODY MASS INDEX DOCD: CPT | Performed by: FAMILY MEDICINE

## 2022-11-17 PROCEDURE — 99214 OFFICE O/P EST MOD 30 MIN: CPT | Performed by: FAMILY MEDICINE

## 2022-11-17 RX ORDER — FLUTICASONE PROPIONATE AND SALMETEROL 250; 50 UG/1; UG/1
POWDER RESPIRATORY (INHALATION)
Qty: 180 EACH | Refills: 0 | Status: SHIPPED | OUTPATIENT
Start: 2022-11-17

## 2022-11-17 RX ORDER — LOSARTAN POTASSIUM 50 MG/1
50 TABLET ORAL DAILY
COMMUNITY
Start: 2022-11-05

## 2022-11-17 RX ORDER — FLUTICASONE PROPIONATE AND SALMETEROL 250; 50 UG/1; UG/1
1 POWDER RESPIRATORY (INHALATION) EVERY 12 HOURS SCHEDULED
Qty: 1 EACH | Refills: 1 | Status: SHIPPED | OUTPATIENT
Start: 2022-11-17 | End: 2022-11-17

## 2022-11-21 ENCOUNTER — HOSPITAL ENCOUNTER (OUTPATIENT)
Facility: HOSPITAL | Age: 77
Setting detail: OBSERVATION
Discharge: HOME | End: 2022-11-23
Attending: EMERGENCY MEDICINE | Admitting: HOSPITALIST
Payer: MEDICARE

## 2022-11-21 ENCOUNTER — APPOINTMENT (OUTPATIENT)
Dept: CT IMAGING | Facility: HOSPITAL | Age: 77
End: 2022-11-21
Attending: EMERGENCY MEDICINE
Payer: MEDICARE

## 2022-11-21 ENCOUNTER — APPOINTMENT (OUTPATIENT)
Dept: GENERAL RADIOLOGY | Facility: HOSPITAL | Age: 77
End: 2022-11-21
Attending: EMERGENCY MEDICINE
Payer: MEDICARE

## 2022-11-21 ENCOUNTER — TELEPHONE (OUTPATIENT)
Dept: FAMILY MEDICINE CLINIC | Facility: CLINIC | Age: 77
End: 2022-11-21

## 2022-11-21 DIAGNOSIS — E86.0 DEHYDRATION: ICD-10-CM

## 2022-11-21 DIAGNOSIS — R53.1 WEAKNESS GENERALIZED: ICD-10-CM

## 2022-11-21 DIAGNOSIS — R31.9 URINARY TRACT INFECTION WITH HEMATURIA, SITE UNSPECIFIED: Primary | ICD-10-CM

## 2022-11-21 DIAGNOSIS — N39.0 URINARY TRACT INFECTION WITH HEMATURIA, SITE UNSPECIFIED: Primary | ICD-10-CM

## 2022-11-21 LAB
ALBUMIN SERPL-MCNC: 2.9 G/DL (ref 3.4–5)
ALBUMIN/GLOB SERPL: 0.6 {RATIO} (ref 1–2)
ALP LIVER SERPL-CCNC: 55 U/L
ALT SERPL-CCNC: 19 U/L
ANION GAP SERPL CALC-SCNC: 5 MMOL/L (ref 0–18)
AST SERPL-CCNC: 25 U/L (ref 15–37)
BASOPHILS # BLD AUTO: 0.02 X10(3) UL (ref 0–0.2)
BASOPHILS NFR BLD AUTO: 0.3 %
BILIRUB SERPL-MCNC: 0.3 MG/DL (ref 0.1–2)
BILIRUB UR QL STRIP.AUTO: NEGATIVE
BUN BLD-MCNC: 31 MG/DL (ref 7–18)
CALCIUM BLD-MCNC: 9 MG/DL (ref 8.5–10.1)
CHLORIDE SERPL-SCNC: 104 MMOL/L (ref 98–112)
CO2 SERPL-SCNC: 24 MMOL/L (ref 21–32)
COLOR UR AUTO: YELLOW
CREAT BLD-MCNC: 1.26 MG/DL
EOSINOPHIL # BLD AUTO: 0.01 X10(3) UL (ref 0–0.7)
EOSINOPHIL NFR BLD AUTO: 0.2 %
ERYTHROCYTE [DISTWIDTH] IN BLOOD BY AUTOMATED COUNT: 12.1 %
FLUAV + FLUBV RNA SPEC NAA+PROBE: NEGATIVE
FLUAV + FLUBV RNA SPEC NAA+PROBE: NEGATIVE
GFR SERPLBLD BASED ON 1.73 SQ M-ARVRAT: 58 ML/MIN/1.73M2 (ref 60–?)
GLOBULIN PLAS-MCNC: 4.5 G/DL (ref 2.8–4.4)
GLUCOSE BLD-MCNC: 123 MG/DL (ref 70–99)
GLUCOSE UR STRIP.AUTO-MCNC: NEGATIVE MG/DL
HCT VFR BLD AUTO: 31.8 %
HGB BLD-MCNC: 10.6 G/DL
IMM GRANULOCYTES # BLD AUTO: 0.02 X10(3) UL (ref 0–1)
IMM GRANULOCYTES NFR BLD: 0.3 %
KETONES UR STRIP.AUTO-MCNC: NEGATIVE MG/DL
LACTATE SERPL-SCNC: 0.7 MMOL/L (ref 0.4–2)
LIPASE SERPL-CCNC: 138 U/L (ref 73–393)
LYMPHOCYTES # BLD AUTO: 0.72 X10(3) UL (ref 1–4)
LYMPHOCYTES NFR BLD AUTO: 11.8 %
MCH RBC QN AUTO: 32.1 PG (ref 26–34)
MCHC RBC AUTO-ENTMCNC: 33.3 G/DL (ref 31–37)
MCV RBC AUTO: 96.4 FL
MONOCYTES # BLD AUTO: 0.86 X10(3) UL (ref 0.1–1)
MONOCYTES NFR BLD AUTO: 14.1 %
NEUTROPHILS # BLD AUTO: 4.47 X10 (3) UL (ref 1.5–7.7)
NEUTROPHILS # BLD AUTO: 4.47 X10(3) UL (ref 1.5–7.7)
NEUTROPHILS NFR BLD AUTO: 73.3 %
NITRITE UR QL STRIP.AUTO: POSITIVE
OSMOLALITY SERPL CALC.SUM OF ELEC: 284 MOSM/KG (ref 275–295)
PH UR STRIP.AUTO: 5 [PH] (ref 5–8)
PLATELET # BLD AUTO: 191 10(3)UL (ref 150–450)
POTASSIUM SERPL-SCNC: 3.8 MMOL/L (ref 3.5–5.1)
PROCALCITONIN SERPL-MCNC: 1.22 NG/ML (ref ?–0.16)
PROT SERPL-MCNC: 7.4 G/DL (ref 6.4–8.2)
PROT UR STRIP.AUTO-MCNC: 100 MG/DL
RBC # BLD AUTO: 3.3 X10(6)UL
RBC #/AREA URNS AUTO: >10 /HPF
RSV RNA SPEC NAA+PROBE: NEGATIVE
SARS-COV-2 RNA RESP QL NAA+PROBE: NOT DETECTED
SODIUM SERPL-SCNC: 133 MMOL/L (ref 136–145)
SP GR UR STRIP.AUTO: 1.01 (ref 1–1.03)
UROBILINOGEN UR STRIP.AUTO-MCNC: <2 MG/DL
WBC # BLD AUTO: 6.1 X10(3) UL (ref 4–11)
WBC #/AREA URNS AUTO: >50 /HPF
WBC CLUMPS UR QL AUTO: PRESENT /HPF

## 2022-11-21 PROCEDURE — 71045 X-RAY EXAM CHEST 1 VIEW: CPT | Performed by: EMERGENCY MEDICINE

## 2022-11-21 PROCEDURE — 74177 CT ABD & PELVIS W/CONTRAST: CPT | Performed by: EMERGENCY MEDICINE

## 2022-11-21 PROCEDURE — 99223 1ST HOSP IP/OBS HIGH 75: CPT | Performed by: INTERNAL MEDICINE

## 2022-11-21 PROCEDURE — 71260 CT THORAX DX C+: CPT | Performed by: EMERGENCY MEDICINE

## 2022-11-21 RX ORDER — ACETAMINOPHEN 500 MG
1000 TABLET ORAL ONCE
Status: COMPLETED | OUTPATIENT
Start: 2022-11-21 | End: 2022-11-21

## 2022-11-21 RX ORDER — IOHEXOL 350 MG/ML
100 INJECTION, SOLUTION INTRAVENOUS
Status: COMPLETED | OUTPATIENT
Start: 2022-11-21 | End: 2022-11-21

## 2022-11-21 NOTE — TELEPHONE ENCOUNTER
PT WAS IN TO SEE DR CAMERON LAST WEEK Friday. NISHA GAVE HIM MEDS FOR BREATHING. HE IS SHAKING ALL THE TIME AND NOT RUNNING A FEVER NOW, BUT DID YESTERDAY AND Saturday. (102)    WON'T EAT OR DRINK. STILL HAVING A BREATHING ISSUE. NOT SURE IF IT IS THE NEW MEDS FROM NISHA. NISHA TOLD HIM TO CALL BACK IF ANY PROBS. PLS CALL BACK TO ADVISE.

## 2022-11-22 PROBLEM — R53.1 WEAKNESS GENERALIZED: Status: ACTIVE | Noted: 2022-11-22

## 2022-11-22 PROBLEM — E86.0 DEHYDRATION: Status: ACTIVE | Noted: 2022-11-22

## 2022-11-22 LAB
ANION GAP SERPL CALC-SCNC: 7 MMOL/L (ref 0–18)
BASOPHILS # BLD AUTO: 0.02 X10(3) UL (ref 0–0.2)
BASOPHILS NFR BLD AUTO: 0.3 %
BUN BLD-MCNC: 28 MG/DL (ref 7–18)
CALCIUM BLD-MCNC: 9.2 MG/DL (ref 8.5–10.1)
CHLORIDE SERPL-SCNC: 107 MMOL/L (ref 98–112)
CO2 SERPL-SCNC: 24 MMOL/L (ref 21–32)
CREAT BLD-MCNC: 1.23 MG/DL
EOSINOPHIL # BLD AUTO: 0.04 X10(3) UL (ref 0–0.7)
EOSINOPHIL NFR BLD AUTO: 0.7 %
ERYTHROCYTE [DISTWIDTH] IN BLOOD BY AUTOMATED COUNT: 12.3 %
GFR SERPLBLD BASED ON 1.73 SQ M-ARVRAT: 60 ML/MIN/1.73M2 (ref 60–?)
GLUCOSE BLD-MCNC: 98 MG/DL (ref 70–99)
HCT VFR BLD AUTO: 34.4 %
HGB BLD-MCNC: 11.2 G/DL
IMM GRANULOCYTES # BLD AUTO: 0.03 X10(3) UL (ref 0–1)
IMM GRANULOCYTES NFR BLD: 0.5 %
LYMPHOCYTES # BLD AUTO: 0.68 X10(3) UL (ref 1–4)
LYMPHOCYTES NFR BLD AUTO: 11.6 %
MCH RBC QN AUTO: 32.7 PG (ref 26–34)
MCHC RBC AUTO-ENTMCNC: 32.6 G/DL (ref 31–37)
MCV RBC AUTO: 100.3 FL
MONOCYTES # BLD AUTO: 0.9 X10(3) UL (ref 0.1–1)
MONOCYTES NFR BLD AUTO: 15.4 %
NEUTROPHILS # BLD AUTO: 4.18 X10 (3) UL (ref 1.5–7.7)
NEUTROPHILS # BLD AUTO: 4.18 X10(3) UL (ref 1.5–7.7)
NEUTROPHILS NFR BLD AUTO: 71.5 %
OSMOLALITY SERPL CALC.SUM OF ELEC: 291 MOSM/KG (ref 275–295)
PLATELET # BLD AUTO: 190 10(3)UL (ref 150–450)
POTASSIUM SERPL-SCNC: 4.3 MMOL/L (ref 3.5–5.1)
RBC # BLD AUTO: 3.43 X10(6)UL
SODIUM SERPL-SCNC: 138 MMOL/L (ref 136–145)
WBC # BLD AUTO: 5.9 X10(3) UL (ref 4–11)

## 2022-11-22 PROCEDURE — 99232 SBSQ HOSP IP/OBS MODERATE 35: CPT | Performed by: INTERNAL MEDICINE

## 2022-11-22 RX ORDER — FLUTICASONE FUROATE AND VILANTEROL 200; 25 UG/1; UG/1
1 POWDER RESPIRATORY (INHALATION) DAILY
Status: DISCONTINUED | OUTPATIENT
Start: 2022-11-22 | End: 2022-11-23

## 2022-11-22 RX ORDER — LOSARTAN POTASSIUM 50 MG/1
50 TABLET ORAL DAILY
Status: DISCONTINUED | OUTPATIENT
Start: 2022-11-22 | End: 2022-11-23

## 2022-11-22 RX ORDER — ATORVASTATIN CALCIUM 10 MG/1
10 TABLET, FILM COATED ORAL NIGHTLY
Status: DISCONTINUED | OUTPATIENT
Start: 2022-11-22 | End: 2022-11-23

## 2022-11-22 RX ORDER — SODIUM CHLORIDE 9 MG/ML
INJECTION, SOLUTION INTRAVENOUS CONTINUOUS
Status: DISCONTINUED | OUTPATIENT
Start: 2022-11-22 | End: 2022-11-23

## 2022-11-22 RX ORDER — ACETAMINOPHEN 500 MG
500 TABLET ORAL EVERY 4 HOURS PRN
Status: DISCONTINUED | OUTPATIENT
Start: 2022-11-22 | End: 2022-11-23

## 2022-11-22 RX ORDER — POTASSIUM CHLORIDE 20 MEQ/1
40 TABLET, EXTENDED RELEASE ORAL ONCE
Status: COMPLETED | OUTPATIENT
Start: 2022-11-22 | End: 2022-11-22

## 2022-11-22 RX ORDER — PANTOPRAZOLE SODIUM 20 MG/1
20 TABLET, DELAYED RELEASE ORAL
Status: DISCONTINUED | OUTPATIENT
Start: 2022-11-22 | End: 2022-11-23

## 2022-11-22 RX ORDER — ALBUTEROL SULFATE 90 UG/1
2 AEROSOL, METERED RESPIRATORY (INHALATION) EVERY 6 HOURS PRN
Status: DISCONTINUED | OUTPATIENT
Start: 2022-11-22 | End: 2022-11-23

## 2022-11-22 RX ORDER — ASPIRIN 81 MG/1
81 TABLET, CHEWABLE ORAL DAILY
Status: DISCONTINUED | OUTPATIENT
Start: 2022-11-22 | End: 2022-11-23

## 2022-11-22 RX ORDER — SODIUM CHLORIDE 9 MG/ML
INJECTION, SOLUTION INTRAVENOUS CONTINUOUS
Status: ACTIVE | OUTPATIENT
Start: 2022-11-22 | End: 2022-11-22

## 2022-11-22 RX ORDER — METOPROLOL SUCCINATE 25 MG/1
25 TABLET, EXTENDED RELEASE ORAL
Status: DISCONTINUED | OUTPATIENT
Start: 2022-11-22 | End: 2022-11-23

## 2022-11-22 RX ORDER — HEPARIN SODIUM 5000 [USP'U]/ML
5000 INJECTION, SOLUTION INTRAVENOUS; SUBCUTANEOUS EVERY 8 HOURS SCHEDULED
Status: DISCONTINUED | OUTPATIENT
Start: 2022-11-22 | End: 2022-11-23

## 2022-11-22 NOTE — ED QUICK NOTES
Orders for admission, patient is aware of plan and ready to go upstairs. Any questions, please call ED RN Will at extension [phone].      Patient Covid vaccination status: Fully vaccinated     COVID Test Ordered in ED: SARS-CoV-2/Flu A and B/RSV by PCR (GeneXpert)    COVID Suspicion at Admission: N/A    Running Infusions:  None    Mental Status/LOC at time of transport: aox4    Other pertinent information:   CIWA score: N/A   NIH score:  N/A

## 2022-11-22 NOTE — ED INITIAL ASSESSMENT (HPI)
Patient to ER w/ complaints of KRISTIN, shakiness, nausea, and intermittent fevers. Symptoms have been occurring for the last two weeks or so, however, shakiness is worse today. MD Jarrell advised patient to come to ER for further eval.     spo2 97% on RA  Hx pulmonary emphysema.

## 2022-11-22 NOTE — PLAN OF CARE
Problem: RISK FOR INFECTION - ADULT  Goal: Absence of fever/infection during anticipated neutropenic period  Description: INTERVENTIONS  - Monitor WBC  - Administer growth factors as ordered  - Implement neutropenic guidelines  Outcome: Progressing     Problem: PAIN - ADULT  Goal: Verbalizes/displays adequate comfort level or patient's stated pain goal  Description: INTERVENTIONS:  - Encourage pt to monitor pain and request assistance  - Assess pain using appropriate pain scale  - Administer analgesics based on type and severity of pain and evaluate response  - Implement non-pharmacological measures as appropriate and evaluate response  - Consider cultural and social influences on pain and pain management  - Manage/alleviate anxiety  - Utilize distraction and/or relaxation techniques  - Monitor for opioid side effects  - Notify MD/LIP if interventions unsuccessful or patient reports new pain  - Anticipate increased pain with activity and pre-medicate as appropriate  Outcome: Progressing     Patient alert and oriented. Initially complained of chills and pain in his upper quads, pain resolved with Tylenol. Patient comfortable trough the day, ambulates with standby in the hallway.

## 2022-11-22 NOTE — PROGRESS NOTES
NURSING ADMISSION NOTE      Patient admitted via Cart  Oriented to room. 510  Safety precautions initiated. Bed in low position. Call light in reach. Pt navigators and home meds placed. Admitted for UTI, SOB, wknss. Pt AOx4. VSS,afebrile. SpO2 >90% on RA. No tele, heparin. Continent of Bowel and Bladder. Cardiac diet. 0.9 at 125ml/hr. IV rocephin. No c/o pain, N/V/D/C. Pt udpated on POC. Call light within reach, safety precautions in place. No further pt needs at this time.

## 2022-11-23 VITALS
WEIGHT: 162 LBS | BODY MASS INDEX: 22 KG/M2 | OXYGEN SATURATION: 93 % | TEMPERATURE: 97 F | HEART RATE: 63 BPM | DIASTOLIC BLOOD PRESSURE: 66 MMHG | RESPIRATION RATE: 17 BRPM | SYSTOLIC BLOOD PRESSURE: 123 MMHG

## 2022-11-23 LAB — POTASSIUM SERPL-SCNC: 3.8 MMOL/L (ref 3.5–5.1)

## 2022-11-23 PROCEDURE — 99239 HOSP IP/OBS DSCHRG MGMT >30: CPT | Performed by: STUDENT IN AN ORGANIZED HEALTH CARE EDUCATION/TRAINING PROGRAM

## 2022-11-23 RX ORDER — POTASSIUM CHLORIDE 20 MEQ/1
40 TABLET, EXTENDED RELEASE ORAL ONCE
Status: COMPLETED | OUTPATIENT
Start: 2022-11-23 | End: 2022-11-23

## 2022-11-23 RX ORDER — CEFDINIR 300 MG/1
300 CAPSULE ORAL 2 TIMES DAILY
Qty: 20 CAPSULE | Refills: 0 | Status: SHIPPED | OUTPATIENT
Start: 2022-11-23 | End: 2022-11-30 | Stop reason: ALTCHOICE

## 2022-11-23 NOTE — PROGRESS NOTES
NURSING DISCHARGE NOTE    Discharged Home via Wheelchair. Accompanied by RN  Belongings Taken by patient/family. Pt discharged home. IV removed. Per MD Magdiel Milligan, will follow up on sensitivity result for pt if needed. Discharge instruction reviewed with pt and pts family.

## 2022-11-23 NOTE — PROGRESS NOTES
Problem: UTI and Hematuria     Data: Pt is A&Ox4. On room air. No tele. Gets heparin SubQ. Voids. Up independently. No c/o of Nausea, vomiting or diarrhea. So far pt has only c/o of chills and rigors x1 overnight. PRN tylenol given. Urine yellow and clear. Cultures pending. Intervention: IVF, IV abx, PRN tylenol and Ibuprofen     Edu:  Pt updated on POC. Vitals stable WCTM.

## 2022-11-23 NOTE — PLAN OF CARE
Problem: Patient/Family Goals  Goal: Patient/Family Long Term Goal  Description: Patient's Long Term Goal:  to be discharged     Interventions:  - follow MD orders    - See additional Care Plan goals for specific interventions  Outcome: Progressing  Goal: Patient/Family Short Term Goal  Description: Patient's Short Term Goal:     11/22NOC: control rigors and chills     Interventions:   - Tylenol, ibuprofen, ice packs    - See additional Care Plan goals for specific interventions  Outcome: Progressing     Problem: RISK FOR INFECTION - ADULT  Goal: Absence of fever/infection during anticipated neutropenic period  Description: INTERVENTIONS  - Monitor WBC  - Administer growth factors as ordered  - Implement neutropenic guidelines  Outcome: Progressing     Problem: PAIN - ADULT  Goal: Verbalizes/displays adequate comfort level or patient's stated pain goal  Description: INTERVENTIONS:  - Encourage pt to monitor pain and request assistance  - Assess pain using appropriate pain scale  - Administer analgesics based on type and severity of pain and evaluate response  - Implement non-pharmacological measures as appropriate and evaluate response  - Consider cultural and social influences on pain and pain management  - Manage/alleviate anxiety  - Utilize distraction and/or relaxation techniques  - Monitor for opioid side effects  - Notify MD/LIP if interventions unsuccessful or patient reports new pain  - Anticipate increased pain with activity and pre-medicate as appropriate  Outcome: Progressing

## 2022-11-24 ENCOUNTER — TELEPHONE (OUTPATIENT)
Dept: INTERNAL MEDICINE UNIT | Facility: HOSPITAL | Age: 77
End: 2022-11-24

## 2022-11-24 DIAGNOSIS — N30.00 ACUTE CYSTITIS WITHOUT HEMATURIA: Primary | ICD-10-CM

## 2022-11-24 RX ORDER — NITROFURANTOIN 25; 75 MG/1; MG/1
100 CAPSULE ORAL 2 TIMES DAILY
Qty: 14 CAPSULE | Refills: 0 | Status: SHIPPED | OUTPATIENT
Start: 2022-11-24 | End: 2022-11-30 | Stop reason: ALTCHOICE

## 2022-11-24 RX ORDER — NITROFURANTOIN 25; 75 MG/1; MG/1
100 CAPSULE ORAL 2 TIMES DAILY
Qty: 14 CAPSULE | Refills: 0 | Status: SHIPPED | OUTPATIENT
Start: 2022-11-24 | End: 2022-12-01

## 2022-11-24 NOTE — TELEPHONE ENCOUNTER
Called family regarding susceptibilities for Urine cultures, switched antibiotics to Macrobid for 7 days, sent to patient's preferred 24hr pharmacy. Pt feeling well, plans to follow-up with PCP to ensure resolution of infection.      Allison Jimenez MD

## 2022-11-25 ENCOUNTER — TELEPHONE (OUTPATIENT)
Dept: FAMILY MEDICINE CLINIC | Facility: CLINIC | Age: 77
End: 2022-11-25

## 2022-11-25 ENCOUNTER — PATIENT OUTREACH (OUTPATIENT)
Dept: CASE MANAGEMENT | Age: 77
End: 2022-11-25

## 2022-11-25 DIAGNOSIS — Z02.9 ENCOUNTERS FOR UNSPECIFIED ADMINISTRATIVE PURPOSE: ICD-10-CM

## 2022-11-25 DIAGNOSIS — N39.0 URINARY TRACT INFECTION WITH HEMATURIA, SITE UNSPECIFIED: ICD-10-CM

## 2022-11-25 DIAGNOSIS — R31.9 URINARY TRACT INFECTION WITH HEMATURIA, SITE UNSPECIFIED: ICD-10-CM

## 2022-11-25 PROCEDURE — 1111F DSCHRG MED/CURRENT MED MERGE: CPT

## 2022-11-25 NOTE — TELEPHONE ENCOUNTER
VICENTA, Spoke to pt for TCM today. Pt declined to schedule with PCP at this time stating that he will be calling PCP office to schedule, he will also send my chart message. TCM/HFU appt recommended by 11/30/22 as pt is a high risk for readmission.        BOOK BY DATE (last date for TCM): 12/7/22

## 2022-11-30 ENCOUNTER — OFFICE VISIT (OUTPATIENT)
Dept: FAMILY MEDICINE CLINIC | Facility: CLINIC | Age: 77
End: 2022-11-30
Payer: MEDICARE

## 2022-11-30 VITALS
RESPIRATION RATE: 16 BRPM | HEIGHT: 72 IN | WEIGHT: 160.38 LBS | SYSTOLIC BLOOD PRESSURE: 110 MMHG | DIASTOLIC BLOOD PRESSURE: 70 MMHG | HEART RATE: 66 BPM | BODY MASS INDEX: 21.72 KG/M2 | OXYGEN SATURATION: 97 %

## 2022-11-30 DIAGNOSIS — N39.0 URINARY TRACT INFECTION WITH HEMATURIA, SITE UNSPECIFIED: Primary | ICD-10-CM

## 2022-11-30 DIAGNOSIS — J43.9 PULMONARY EMPHYSEMA, UNSPECIFIED EMPHYSEMA TYPE (HCC): ICD-10-CM

## 2022-11-30 DIAGNOSIS — R31.9 URINARY TRACT INFECTION WITH HEMATURIA, SITE UNSPECIFIED: Primary | ICD-10-CM

## 2022-11-30 PROBLEM — E86.0 DEHYDRATION: Status: RESOLVED | Noted: 2022-11-22 | Resolved: 2022-11-30

## 2022-11-30 PROBLEM — R53.1 WEAKNESS GENERALIZED: Status: RESOLVED | Noted: 2022-11-22 | Resolved: 2022-11-30

## 2022-11-30 PROBLEM — H40.001 GLAUCOMA SUSPECT OF RIGHT EYE: Status: RESOLVED | Noted: 2022-01-21 | Resolved: 2022-11-30

## 2022-11-30 PROCEDURE — 1111F DSCHRG MED/CURRENT MED MERGE: CPT | Performed by: FAMILY MEDICINE

## 2022-11-30 PROCEDURE — 3008F BODY MASS INDEX DOCD: CPT | Performed by: FAMILY MEDICINE

## 2022-11-30 PROCEDURE — 3074F SYST BP LT 130 MM HG: CPT | Performed by: FAMILY MEDICINE

## 2022-11-30 PROCEDURE — 3078F DIAST BP <80 MM HG: CPT | Performed by: FAMILY MEDICINE

## 2022-11-30 PROCEDURE — 99214 OFFICE O/P EST MOD 30 MIN: CPT | Performed by: FAMILY MEDICINE

## 2022-12-21 DIAGNOSIS — N52.9 ED (ERECTILE DYSFUNCTION) OF ORGANIC ORIGIN: ICD-10-CM

## 2022-12-21 RX ORDER — SILDENAFIL 100 MG/1
TABLET, FILM COATED ORAL
Qty: 4 TABLET | Refills: 11 | Status: SHIPPED | OUTPATIENT
Start: 2022-12-21

## 2023-01-31 ENCOUNTER — OFFICE VISIT (OUTPATIENT)
Dept: SURGERY | Facility: CLINIC | Age: 78
End: 2023-01-31
Payer: MEDICARE

## 2023-01-31 VITALS
SYSTOLIC BLOOD PRESSURE: 118 MMHG | WEIGHT: 160 LBS | DIASTOLIC BLOOD PRESSURE: 64 MMHG | HEIGHT: 72 IN | BODY MASS INDEX: 21.67 KG/M2 | HEART RATE: 52 BPM

## 2023-01-31 DIAGNOSIS — M47.816 LUMBAR SPONDYLOSIS: Primary | ICD-10-CM

## 2023-01-31 DIAGNOSIS — M54.50 CHRONIC MIDLINE LOW BACK PAIN WITHOUT SCIATICA: ICD-10-CM

## 2023-01-31 DIAGNOSIS — G89.29 CHRONIC MIDLINE LOW BACK PAIN WITHOUT SCIATICA: ICD-10-CM

## 2023-01-31 PROCEDURE — 3008F BODY MASS INDEX DOCD: CPT | Performed by: NEUROLOGICAL SURGERY

## 2023-01-31 PROCEDURE — 99212 OFFICE O/P EST SF 10 MIN: CPT | Performed by: NEUROLOGICAL SURGERY

## 2023-01-31 PROCEDURE — 3078F DIAST BP <80 MM HG: CPT | Performed by: NEUROLOGICAL SURGERY

## 2023-01-31 PROCEDURE — 3074F SYST BP LT 130 MM HG: CPT | Performed by: NEUROLOGICAL SURGERY

## 2023-01-31 RX ORDER — MELOXICAM 7.5 MG/1
7.5 TABLET ORAL DAILY
Qty: 30 TABLET | Refills: 0 | Status: SHIPPED | OUTPATIENT
Start: 2023-01-31

## 2023-01-31 NOTE — PROGRESS NOTES
Patient here to follow up on lower back pain and left hip pain      Pain score: 1+/10 when sitting, worsens when walking

## 2023-02-01 ENCOUNTER — TELEPHONE (OUTPATIENT)
Dept: SURGERY | Facility: CLINIC | Age: 78
End: 2023-02-01

## 2023-02-01 NOTE — TELEPHONE ENCOUNTER
Received LSO DME orders from provider for [facility] collar).       All following information printed and faxed:  DME order YES  Patient face sheet YES   insurance information YES  Last office visit notes YES       fax number:   [phone]      Received conformation

## 2023-02-08 ENCOUNTER — OFFICE VISIT (OUTPATIENT)
Dept: FAMILY MEDICINE CLINIC | Facility: CLINIC | Age: 78
End: 2023-02-08
Payer: MEDICARE

## 2023-02-08 ENCOUNTER — LAB ENCOUNTER (OUTPATIENT)
Dept: LAB | Age: 78
End: 2023-02-08
Attending: FAMILY MEDICINE
Payer: MEDICARE

## 2023-02-08 VITALS
WEIGHT: 160 LBS | OXYGEN SATURATION: 96 % | SYSTOLIC BLOOD PRESSURE: 138 MMHG | HEIGHT: 72 IN | BODY MASS INDEX: 21.67 KG/M2 | HEART RATE: 59 BPM | DIASTOLIC BLOOD PRESSURE: 72 MMHG | RESPIRATION RATE: 16 BRPM

## 2023-02-08 DIAGNOSIS — J43.9 PULMONARY EMPHYSEMA, UNSPECIFIED EMPHYSEMA TYPE (HCC): Primary | ICD-10-CM

## 2023-02-08 DIAGNOSIS — F41.9 ANXIETY: ICD-10-CM

## 2023-02-08 DIAGNOSIS — Z79.899 MEDICATION MANAGEMENT: ICD-10-CM

## 2023-02-08 DIAGNOSIS — M54.32 BILATERAL SCIATICA: ICD-10-CM

## 2023-02-08 DIAGNOSIS — M54.31 BILATERAL SCIATICA: ICD-10-CM

## 2023-02-08 LAB
BUN BLD-MCNC: 30 MG/DL (ref 7–18)
CREAT BLD-MCNC: 1.15 MG/DL
GFR SERPLBLD BASED ON 1.73 SQ M-ARVRAT: 65 ML/MIN/1.73M2 (ref 60–?)

## 2023-02-08 PROCEDURE — 82565 ASSAY OF CREATININE: CPT

## 2023-02-08 PROCEDURE — 84520 ASSAY OF UREA NITROGEN: CPT

## 2023-02-08 PROCEDURE — 3008F BODY MASS INDEX DOCD: CPT | Performed by: FAMILY MEDICINE

## 2023-02-08 PROCEDURE — 99214 OFFICE O/P EST MOD 30 MIN: CPT | Performed by: FAMILY MEDICINE

## 2023-02-08 PROCEDURE — 3078F DIAST BP <80 MM HG: CPT | Performed by: FAMILY MEDICINE

## 2023-02-08 PROCEDURE — 3075F SYST BP GE 130 - 139MM HG: CPT | Performed by: FAMILY MEDICINE

## 2023-02-08 PROCEDURE — 36415 COLL VENOUS BLD VENIPUNCTURE: CPT

## 2023-02-08 RX ORDER — MELOXICAM 15 MG/1
15 TABLET ORAL DAILY
Qty: 90 TABLET | Refills: 3 | Status: SHIPPED | OUTPATIENT
Start: 2023-02-08

## 2023-02-08 RX ORDER — FLUTICASONE PROPIONATE AND SALMETEROL 250; 50 UG/1; UG/1
1 POWDER RESPIRATORY (INHALATION) EVERY 12 HOURS
Qty: 180 EACH | Refills: 3 | Status: SHIPPED | OUTPATIENT
Start: 2023-02-08

## 2023-02-08 RX ORDER — ESCITALOPRAM OXALATE 5 MG/1
5 TABLET ORAL DAILY
Qty: 30 TABLET | Refills: 1 | Status: SHIPPED | OUTPATIENT
Start: 2023-02-08

## 2023-02-08 RX ORDER — ESCITALOPRAM OXALATE 5 MG/1
5 TABLET ORAL DAILY
Qty: 30 TABLET | Refills: 1 | Status: SHIPPED | OUTPATIENT
Start: 2023-02-08 | End: 2023-02-08

## 2023-03-08 ENCOUNTER — OFFICE VISIT (OUTPATIENT)
Dept: FAMILY MEDICINE CLINIC | Facility: CLINIC | Age: 78
End: 2023-03-08
Payer: MEDICARE

## 2023-03-08 VITALS
OXYGEN SATURATION: 97 % | SYSTOLIC BLOOD PRESSURE: 120 MMHG | RESPIRATION RATE: 16 BRPM | WEIGHT: 163.81 LBS | DIASTOLIC BLOOD PRESSURE: 62 MMHG | BODY MASS INDEX: 22.19 KG/M2 | HEART RATE: 62 BPM | HEIGHT: 72 IN

## 2023-03-08 DIAGNOSIS — F41.9 ANXIETY: ICD-10-CM

## 2023-03-08 DIAGNOSIS — M54.31 BILATERAL SCIATICA: Primary | ICD-10-CM

## 2023-03-08 DIAGNOSIS — E78.00 HYPERCHOLESTEROLEMIA: ICD-10-CM

## 2023-03-08 DIAGNOSIS — M54.32 BILATERAL SCIATICA: Primary | ICD-10-CM

## 2023-03-08 PROCEDURE — 3008F BODY MASS INDEX DOCD: CPT | Performed by: FAMILY MEDICINE

## 2023-03-08 PROCEDURE — 3074F SYST BP LT 130 MM HG: CPT | Performed by: FAMILY MEDICINE

## 2023-03-08 PROCEDURE — 3078F DIAST BP <80 MM HG: CPT | Performed by: FAMILY MEDICINE

## 2023-03-08 PROCEDURE — 99213 OFFICE O/P EST LOW 20 MIN: CPT | Performed by: FAMILY MEDICINE

## 2023-03-08 RX ORDER — SIMVASTATIN 20 MG
TABLET ORAL
Qty: 90 TABLET | Refills: 3 | Status: SHIPPED | OUTPATIENT
Start: 2023-03-08

## 2023-03-08 RX ORDER — ESCITALOPRAM OXALATE 5 MG/1
5 TABLET ORAL DAILY
Qty: 90 TABLET | Refills: 1 | Status: SHIPPED | OUTPATIENT
Start: 2023-03-08

## 2023-07-12 ENCOUNTER — OFFICE VISIT (OUTPATIENT)
Dept: FAMILY MEDICINE CLINIC | Facility: CLINIC | Age: 78
End: 2023-07-12
Payer: MEDICARE

## 2023-07-12 ENCOUNTER — LAB ENCOUNTER (OUTPATIENT)
Dept: LAB | Age: 78
End: 2023-07-12
Attending: FAMILY MEDICINE
Payer: MEDICARE

## 2023-07-12 VITALS
HEART RATE: 66 BPM | TEMPERATURE: 98 F | SYSTOLIC BLOOD PRESSURE: 130 MMHG | HEIGHT: 72 IN | OXYGEN SATURATION: 98 % | BODY MASS INDEX: 22.62 KG/M2 | WEIGHT: 167 LBS | RESPIRATION RATE: 16 BRPM | DIASTOLIC BLOOD PRESSURE: 82 MMHG

## 2023-07-12 DIAGNOSIS — Z00.00 ENCOUNTER FOR ANNUAL HEALTH EXAMINATION: Primary | ICD-10-CM

## 2023-07-12 DIAGNOSIS — Z13.6 SCREENING FOR CARDIOVASCULAR CONDITION: ICD-10-CM

## 2023-07-12 DIAGNOSIS — N40.1 BENIGN PROSTATIC HYPERPLASIA WITH NOCTURIA: ICD-10-CM

## 2023-07-12 DIAGNOSIS — R01.1 HEART MURMUR, SYSTOLIC: ICD-10-CM

## 2023-07-12 DIAGNOSIS — H40.023 OPEN ANGLE WITH BORDERLINE FINDINGS AND HIGH GLAUCOMA RISK IN BOTH EYES: ICD-10-CM

## 2023-07-12 DIAGNOSIS — N52.9 ED (ERECTILE DYSFUNCTION) OF ORGANIC ORIGIN: ICD-10-CM

## 2023-07-12 DIAGNOSIS — I77.819 AORTIC ECTASIA (HCC): ICD-10-CM

## 2023-07-12 DIAGNOSIS — E78.00 HYPERCHOLESTEROLEMIA: ICD-10-CM

## 2023-07-12 DIAGNOSIS — D75.89 MACROCYTOSIS WITHOUT ANEMIA: ICD-10-CM

## 2023-07-12 DIAGNOSIS — R35.1 BENIGN PROSTATIC HYPERPLASIA WITH NOCTURIA: ICD-10-CM

## 2023-07-12 DIAGNOSIS — M54.31 BILATERAL SCIATICA: ICD-10-CM

## 2023-07-12 DIAGNOSIS — I10 PRIMARY HYPERTENSION: ICD-10-CM

## 2023-07-12 DIAGNOSIS — F41.9 ANXIETY: ICD-10-CM

## 2023-07-12 DIAGNOSIS — M54.32 BILATERAL SCIATICA: ICD-10-CM

## 2023-07-12 DIAGNOSIS — I70.0 AORTO-ILIAC ATHEROSCLEROSIS: ICD-10-CM

## 2023-07-12 DIAGNOSIS — J43.9 PULMONARY EMPHYSEMA, UNSPECIFIED EMPHYSEMA TYPE (HCC): ICD-10-CM

## 2023-07-12 DIAGNOSIS — I70.8 AORTO-ILIAC ATHEROSCLEROSIS: ICD-10-CM

## 2023-07-12 DIAGNOSIS — Z98.890 H/O COLONOSCOPY WITH POLYPECTOMY: ICD-10-CM

## 2023-07-12 DIAGNOSIS — I10 ESSENTIAL HYPERTENSION: ICD-10-CM

## 2023-07-12 DIAGNOSIS — Z86.010 H/O COLONOSCOPY WITH POLYPECTOMY: ICD-10-CM

## 2023-07-12 PROBLEM — R31.9 URINARY TRACT INFECTION WITH HEMATURIA, SITE UNSPECIFIED: Status: RESOLVED | Noted: 2022-11-21 | Resolved: 2023-07-12

## 2023-07-12 PROBLEM — N39.0 URINARY TRACT INFECTION WITH HEMATURIA, SITE UNSPECIFIED: Status: RESOLVED | Noted: 2022-11-21 | Resolved: 2023-07-12

## 2023-07-12 LAB
ALBUMIN SERPL-MCNC: 3.8 G/DL (ref 3.4–5)
ALBUMIN/GLOB SERPL: 1.2 {RATIO} (ref 1–2)
ALP LIVER SERPL-CCNC: 56 U/L
ALT SERPL-CCNC: 25 U/L
ANION GAP SERPL CALC-SCNC: 3 MMOL/L (ref 0–18)
AST SERPL-CCNC: 29 U/L (ref 15–37)
BASOPHILS # BLD AUTO: 0.04 X10(3) UL (ref 0–0.2)
BASOPHILS NFR BLD AUTO: 0.9 %
BILIRUB SERPL-MCNC: 0.4 MG/DL (ref 0.1–2)
BUN BLD-MCNC: 42 MG/DL (ref 7–18)
CALCIUM BLD-MCNC: 9.1 MG/DL (ref 8.5–10.1)
CHLORIDE SERPL-SCNC: 111 MMOL/L (ref 98–112)
CHOLEST SERPL-MCNC: 156 MG/DL (ref ?–200)
CO2 SERPL-SCNC: 25 MMOL/L (ref 21–32)
COMPLEXED PSA SERPL-MCNC: 2.33 NG/ML (ref ?–4)
CREAT BLD-MCNC: 1.42 MG/DL
EOSINOPHIL # BLD AUTO: 0.26 X10(3) UL (ref 0–0.7)
EOSINOPHIL NFR BLD AUTO: 5.8 %
ERYTHROCYTE [DISTWIDTH] IN BLOOD BY AUTOMATED COUNT: 12.6 %
FASTING PATIENT LIPID ANSWER: NO
FASTING STATUS PATIENT QL REPORTED: NO
GFR SERPLBLD BASED ON 1.73 SQ M-ARVRAT: 50 ML/MIN/1.73M2 (ref 60–?)
GLOBULIN PLAS-MCNC: 3.3 G/DL (ref 2.8–4.4)
GLUCOSE BLD-MCNC: 84 MG/DL (ref 70–99)
HCT VFR BLD AUTO: 38.4 %
HDLC SERPL-MCNC: 62 MG/DL (ref 40–59)
HGB BLD-MCNC: 12.6 G/DL
IMM GRANULOCYTES # BLD AUTO: 0.02 X10(3) UL (ref 0–1)
IMM GRANULOCYTES NFR BLD: 0.4 %
LDLC SERPL CALC-MCNC: 81 MG/DL (ref ?–100)
LYMPHOCYTES # BLD AUTO: 0.68 X10(3) UL (ref 1–4)
LYMPHOCYTES NFR BLD AUTO: 15.1 %
MCH RBC QN AUTO: 33.3 PG (ref 26–34)
MCHC RBC AUTO-ENTMCNC: 32.8 G/DL (ref 31–37)
MCV RBC AUTO: 101.6 FL
MONOCYTES # BLD AUTO: 0.56 X10(3) UL (ref 0.1–1)
MONOCYTES NFR BLD AUTO: 12.4 %
NEUTROPHILS # BLD AUTO: 2.95 X10 (3) UL (ref 1.5–7.7)
NEUTROPHILS # BLD AUTO: 2.95 X10(3) UL (ref 1.5–7.7)
NEUTROPHILS NFR BLD AUTO: 65.4 %
NONHDLC SERPL-MCNC: 94 MG/DL (ref ?–130)
OSMOLALITY SERPL CALC.SUM OF ELEC: 298 MOSM/KG (ref 275–295)
PLATELET # BLD AUTO: 198 10(3)UL (ref 150–450)
POTASSIUM SERPL-SCNC: 4.6 MMOL/L (ref 3.5–5.1)
PROT SERPL-MCNC: 7.1 G/DL (ref 6.4–8.2)
RBC # BLD AUTO: 3.78 X10(6)UL
SODIUM SERPL-SCNC: 139 MMOL/L (ref 136–145)
TRIGL SERPL-MCNC: 63 MG/DL (ref 30–149)
VLDLC SERPL CALC-MCNC: 10 MG/DL (ref 0–30)
WBC # BLD AUTO: 4.5 X10(3) UL (ref 4–11)

## 2023-07-12 PROCEDURE — 1170F FXNL STATUS ASSESSED: CPT | Performed by: FAMILY MEDICINE

## 2023-07-12 PROCEDURE — 80053 COMPREHEN METABOLIC PANEL: CPT

## 2023-07-12 PROCEDURE — 80061 LIPID PANEL: CPT

## 2023-07-12 PROCEDURE — 96160 PT-FOCUSED HLTH RISK ASSMT: CPT | Performed by: FAMILY MEDICINE

## 2023-07-12 PROCEDURE — 3008F BODY MASS INDEX DOCD: CPT | Performed by: FAMILY MEDICINE

## 2023-07-12 PROCEDURE — 1159F MED LIST DOCD IN RCRD: CPT | Performed by: FAMILY MEDICINE

## 2023-07-12 PROCEDURE — 90715 TDAP VACCINE 7 YRS/> IM: CPT | Performed by: FAMILY MEDICINE

## 2023-07-12 PROCEDURE — 1125F AMNT PAIN NOTED PAIN PRSNT: CPT | Performed by: FAMILY MEDICINE

## 2023-07-12 PROCEDURE — G0439 PPPS, SUBSEQ VISIT: HCPCS | Performed by: FAMILY MEDICINE

## 2023-07-12 PROCEDURE — 3079F DIAST BP 80-89 MM HG: CPT | Performed by: FAMILY MEDICINE

## 2023-07-12 PROCEDURE — 99213 OFFICE O/P EST LOW 20 MIN: CPT | Performed by: FAMILY MEDICINE

## 2023-07-12 PROCEDURE — 36415 COLL VENOUS BLD VENIPUNCTURE: CPT

## 2023-07-12 PROCEDURE — 3075F SYST BP GE 130 - 139MM HG: CPT | Performed by: FAMILY MEDICINE

## 2023-07-12 PROCEDURE — 1160F RVW MEDS BY RX/DR IN RCRD: CPT | Performed by: FAMILY MEDICINE

## 2023-07-12 PROCEDURE — 90471 IMMUNIZATION ADMIN: CPT | Performed by: FAMILY MEDICINE

## 2023-07-12 PROCEDURE — 85025 COMPLETE CBC W/AUTO DIFF WBC: CPT

## 2023-07-12 RX ORDER — SILDENAFIL 100 MG/1
100 TABLET, FILM COATED ORAL AS NEEDED
Qty: 4 TABLET | Refills: 11 | Status: SHIPPED | OUTPATIENT
Start: 2023-07-12

## 2023-07-12 RX ORDER — LOSARTAN POTASSIUM 50 MG/1
50 TABLET ORAL DAILY
Qty: 90 TABLET | Refills: 3 | Status: SHIPPED | OUTPATIENT
Start: 2023-07-12

## 2023-07-12 RX ORDER — METOPROLOL SUCCINATE 25 MG/1
25 TABLET, EXTENDED RELEASE ORAL DAILY
Qty: 90 TABLET | Refills: 3 | Status: SHIPPED | OUTPATIENT
Start: 2023-07-12

## 2023-07-12 RX ORDER — MELOXICAM 15 MG/1
15 TABLET ORAL DAILY
Qty: 90 TABLET | Refills: 3 | Status: SHIPPED | OUTPATIENT
Start: 2023-07-12

## 2023-07-12 RX ORDER — SERTRALINE HYDROCHLORIDE 25 MG/1
25 TABLET, FILM COATED ORAL DAILY
Qty: 30 TABLET | Refills: 1 | Status: SHIPPED | OUTPATIENT
Start: 2023-07-12

## 2023-07-12 RX ORDER — SIMVASTATIN 20 MG
TABLET ORAL
Qty: 90 TABLET | Refills: 3 | Status: SHIPPED | OUTPATIENT
Start: 2023-07-12

## 2023-07-12 RX ORDER — FLUTICASONE PROPIONATE AND SALMETEROL 250; 50 UG/1; UG/1
1 POWDER RESPIRATORY (INHALATION) EVERY 12 HOURS
Qty: 180 EACH | Refills: 3 | Status: CANCELLED | OUTPATIENT
Start: 2023-07-12

## 2023-07-12 RX ORDER — ESCITALOPRAM OXALATE 5 MG/1
5 TABLET ORAL DAILY
Qty: 90 TABLET | Refills: 3 | Status: CANCELLED | OUTPATIENT
Start: 2023-07-12

## 2023-07-12 RX ORDER — FLUTICASONE PROPIONATE AND SALMETEROL 500; 50 UG/1; UG/1
1 POWDER RESPIRATORY (INHALATION) 2 TIMES DAILY
Qty: 3 EACH | Refills: 3 | Status: SHIPPED | OUTPATIENT
Start: 2023-07-12 | End: 2023-08-11

## 2023-08-23 ENCOUNTER — HOSPITAL ENCOUNTER (OUTPATIENT)
Dept: GENERAL RADIOLOGY | Age: 78
Discharge: HOME | End: 2023-08-23
Attending: NURSE PRACTITIONER
Payer: MEDICARE

## 2023-08-23 ENCOUNTER — TELEPHONE (OUTPATIENT)
Dept: SURGERY | Facility: CLINIC | Age: 78
End: 2023-08-23

## 2023-08-23 DIAGNOSIS — M54.50 LUMBAR BACK PAIN: ICD-10-CM

## 2023-08-23 DIAGNOSIS — M54.50 LUMBAR BACK PAIN: Primary | ICD-10-CM

## 2023-08-23 DIAGNOSIS — S32.050A COMPRESSION FRACTURE OF L5 VERTEBRA, INITIAL ENCOUNTER (HCC): ICD-10-CM

## 2023-08-23 DIAGNOSIS — S32.010A COMPRESSION FRACTURE OF L1 VERTEBRA, INITIAL ENCOUNTER (HCC): Primary | ICD-10-CM

## 2023-08-23 PROCEDURE — 72110 X-RAY EXAM L-2 SPINE 4/>VWS: CPT | Performed by: NURSE PRACTITIONER

## 2023-08-23 RX ORDER — METHYLPREDNISOLONE 4 MG/1
TABLET ORAL
Qty: 1 EACH | Refills: 0 | Status: SHIPPED | OUTPATIENT
Start: 2023-08-23

## 2023-08-23 NOTE — TELEPHONE ENCOUNTER
Pt's spouse called to advise pt had a fall and is in a lot of pain; Pt would like to be seen sooner if possible, advised pt no sooner appts are avail and pt is currently on the wait list if any cancellations occur. Pt would like to know what should be done if no sooner apts opens up.

## 2023-08-23 NOTE — TELEPHONE ENCOUNTER
Called and spoke with patient's spouse. She states pt sustained a fall and is having 10/10 pain. She states he was previously given a steroid pack for this and this had helped. Pt states this is similar to pain he had before only worse. Pt denies new weakness. MDP sent to pharmacy and lumbar Xrs ordered. Advised pt that if he has new weakness or no improvement by 8/25, would have him go to the ED.   Otherwise he will call next week with update

## 2023-08-23 NOTE — TELEPHONE ENCOUNTER
LOV 1.31.23 w/ Dr. 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 Karla North spoke with the patient, reviewed imaging, and agreed with the plan. Mark Upton has a combination of axial back pain and claudication type back pain. He responded extremely well to steroids. His last 2 GFR has been 58-59, borderline. Can trial low dose mobic, he will f/u with his PCP to follow his kidney function. LSO to be worn when standing for long periods of time. Discussed possible laminectomy for claudication type pain but wouldn't help with axial pain. F/u in 2-3 months. \"    F/u w/ Dr. Acosta Decree 9.12.23    Routed to [facility].  For review & input

## 2023-08-24 NOTE — TELEPHONE ENCOUNTER
Noted DARLEEN Avila, BRIGIDO message in the imaging listed below. \"Xrays shows mild compression fracture of L1 and L5 although it is not possible to tell if these are new or old. I am going to order a MRI lumbar which looks for swelling of the bones. If he can wear his old LSO when up walking around that is usually how we treat these.   If his pain is too severe then I would have him go to the ED to expedite work up \"

## 2023-08-29 ENCOUNTER — TELEPHONE (OUTPATIENT)
Dept: SURGERY | Facility: CLINIC | Age: 78
End: 2023-08-29

## 2023-08-29 ENCOUNTER — HOSPITAL ENCOUNTER (OUTPATIENT)
Dept: MRI IMAGING | Age: 78
Discharge: HOME | End: 2023-08-29
Attending: NURSE PRACTITIONER
Payer: MEDICARE

## 2023-08-29 DIAGNOSIS — S32.050A COMPRESSION FRACTURE OF L5 VERTEBRA, INITIAL ENCOUNTER (HCC): ICD-10-CM

## 2023-08-29 DIAGNOSIS — S32.050A COMPRESSION FRACTURE OF L5 LUMBAR VERTEBRA, CLOSED, INITIAL ENCOUNTER (HCC): ICD-10-CM

## 2023-08-29 DIAGNOSIS — S32.010A COMPRESSION FRACTURE OF L1 VERTEBRA, INITIAL ENCOUNTER (HCC): Primary | ICD-10-CM

## 2023-08-29 DIAGNOSIS — S32.010A COMPRESSION FRACTURE OF L1 VERTEBRA, INITIAL ENCOUNTER (HCC): ICD-10-CM

## 2023-08-29 PROCEDURE — 72148 MRI LUMBAR SPINE W/O DYE: CPT | Performed by: NURSE PRACTITIONER

## 2023-08-29 RX ORDER — HYDROCODONE BITARTRATE AND ACETAMINOPHEN 5; 325 MG/1; MG/1
1-2 TABLET ORAL EVERY 6 HOURS PRN
Qty: 60 TABLET | Refills: 0 | Status: SHIPPED | OUTPATIENT
Start: 2023-08-29 | End: 2023-09-01 | Stop reason: ALTCHOICE

## 2023-09-11 ENCOUNTER — OFFICE VISIT (OUTPATIENT)
Dept: FAMILY MEDICINE CLINIC | Facility: CLINIC | Age: 78
End: 2023-09-11
Payer: MEDICARE

## 2023-09-11 ENCOUNTER — HOSPITAL ENCOUNTER (OUTPATIENT)
Dept: GENERAL RADIOLOGY | Age: 78
Discharge: HOME | End: 2023-09-11
Attending: NURSE PRACTITIONER
Payer: MEDICARE

## 2023-09-11 ENCOUNTER — HOSPITAL ENCOUNTER (OUTPATIENT)
Dept: GENERAL RADIOLOGY | Age: 78
Discharge: HOME | DRG: 690 | End: 2023-09-11
Attending: NURSE PRACTITIONER
Payer: MEDICARE

## 2023-09-11 ENCOUNTER — HOSPITAL ENCOUNTER (EMERGENCY)
Facility: HOSPITAL | Age: 78
Discharge: HOME | DRG: 690 | End: 2023-09-11
Attending: EMERGENCY MEDICINE
Payer: MEDICARE

## 2023-09-11 ENCOUNTER — HOSPITAL ENCOUNTER (EMERGENCY)
Facility: HOSPITAL | Age: 78
Discharge: HOME | End: 2023-09-11
Attending: EMERGENCY MEDICINE
Payer: MEDICARE

## 2023-09-11 VITALS
OXYGEN SATURATION: 93 % | HEART RATE: 78 BPM | TEMPERATURE: 98 F | DIASTOLIC BLOOD PRESSURE: 54 MMHG | SYSTOLIC BLOOD PRESSURE: 92 MMHG | RESPIRATION RATE: 18 BRPM

## 2023-09-11 VITALS
HEART RATE: 65 BPM | WEIGHT: 160 LBS | SYSTOLIC BLOOD PRESSURE: 120 MMHG | HEIGHT: 72 IN | TEMPERATURE: 98 F | BODY MASS INDEX: 21.67 KG/M2 | DIASTOLIC BLOOD PRESSURE: 66 MMHG | OXYGEN SATURATION: 99 % | RESPIRATION RATE: 18 BRPM

## 2023-09-11 DIAGNOSIS — S32.050A COMPRESSION FRACTURE OF L5 LUMBAR VERTEBRA, CLOSED, INITIAL ENCOUNTER (HCC): ICD-10-CM

## 2023-09-11 DIAGNOSIS — N30.00 ACUTE CYSTITIS WITHOUT HEMATURIA: ICD-10-CM

## 2023-09-11 DIAGNOSIS — R68.83 CHILLS: ICD-10-CM

## 2023-09-11 DIAGNOSIS — S32.010A COMPRESSION FRACTURE OF L1 VERTEBRA, INITIAL ENCOUNTER (HCC): ICD-10-CM

## 2023-09-11 DIAGNOSIS — R68.89 RIGORS: Primary | ICD-10-CM

## 2023-09-11 LAB
ALBUMIN SERPL-MCNC: 3 G/DL (ref 3.4–5)
ALBUMIN/GLOB SERPL: 0.8 {RATIO} (ref 1–2)
ALP LIVER SERPL-CCNC: 119 U/L
ALT SERPL-CCNC: 29 U/L
ANION GAP SERPL CALC-SCNC: 7 MMOL/L (ref 0–18)
AST SERPL-CCNC: 18 U/L (ref 15–37)
BASOPHILS # BLD AUTO: 0.02 X10(3) UL (ref 0–0.2)
BASOPHILS NFR BLD AUTO: 0.2 %
BILIRUB SERPL-MCNC: 0.4 MG/DL (ref 0.1–2)
BILIRUB UR QL STRIP.AUTO: NEGATIVE
BUN BLD-MCNC: 28 MG/DL (ref 7–18)
CALCIUM BLD-MCNC: 8.4 MG/DL (ref 8.5–10.1)
CHLORIDE SERPL-SCNC: 103 MMOL/L (ref 98–112)
CO2 SERPL-SCNC: 23 MMOL/L (ref 21–32)
CREAT BLD-MCNC: 1.17 MG/DL
EGFRCR SERPLBLD CKD-EPI 2021: 63 ML/MIN/1.73M2 (ref 60–?)
EOSINOPHIL # BLD AUTO: 0.19 X10(3) UL (ref 0–0.7)
EOSINOPHIL NFR BLD AUTO: 2.1 %
ERYTHROCYTE [DISTWIDTH] IN BLOOD BY AUTOMATED COUNT: 12.6 %
GLOBULIN PLAS-MCNC: 3.6 G/DL (ref 2.8–4.4)
GLUCOSE BLD-MCNC: 112 MG/DL (ref 70–99)
GLUCOSE UR STRIP.AUTO-MCNC: NORMAL MG/DL
HCT VFR BLD AUTO: 30.9 %
HGB BLD-MCNC: 10.3 G/DL
IMM GRANULOCYTES # BLD AUTO: 0.04 X10(3) UL (ref 0–1)
IMM GRANULOCYTES NFR BLD: 0.4 %
KETONES UR STRIP.AUTO-MCNC: NEGATIVE MG/DL
LACTATE SERPL-SCNC: 0.8 MMOL/L (ref 0.4–2)
LEUKOCYTE ESTERASE UR QL STRIP.AUTO: 500
LYMPHOCYTES # BLD AUTO: 0.76 X10(3) UL (ref 1–4)
LYMPHOCYTES NFR BLD AUTO: 8.4 %
MCH RBC QN AUTO: 33.1 PG (ref 26–34)
MCHC RBC AUTO-ENTMCNC: 33.3 G/DL (ref 31–37)
MCV RBC AUTO: 99.4 FL
MONOCYTES # BLD AUTO: 1.09 X10(3) UL (ref 0.1–1)
MONOCYTES NFR BLD AUTO: 12 %
NEUTROPHILS # BLD AUTO: 6.96 X10 (3) UL (ref 1.5–7.7)
NEUTROPHILS # BLD AUTO: 6.96 X10(3) UL (ref 1.5–7.7)
NEUTROPHILS NFR BLD AUTO: 76.9 %
NITRITE UR QL STRIP.AUTO: NEGATIVE
OSMOLALITY SERPL CALC.SUM OF ELEC: 282 MOSM/KG (ref 275–295)
PH UR STRIP.AUTO: 5.5 [PH] (ref 5–8)
PLATELET # BLD AUTO: 141 10(3)UL (ref 150–450)
POTASSIUM SERPL-SCNC: 4 MMOL/L (ref 3.5–5.1)
PROT SERPL-MCNC: 6.6 G/DL (ref 6.4–8.2)
PROT UR STRIP.AUTO-MCNC: NEGATIVE MG/DL
RBC # BLD AUTO: 3.11 X10(6)UL
SARS-COV-2 RNA RESP QL NAA+PROBE: NOT DETECTED
SODIUM SERPL-SCNC: 133 MMOL/L (ref 136–145)
SP GR UR STRIP.AUTO: <1.005 (ref 1–1.03)
UROBILINOGEN UR STRIP.AUTO-MCNC: NORMAL MG/DL
WBC # BLD AUTO: 9.1 X10(3) UL (ref 4–11)
WBC #/AREA URNS AUTO: >50 /HPF
YEAST UR QL: PRESENT /HPF

## 2023-09-11 PROCEDURE — 87086 URINE CULTURE/COLONY COUNT: CPT | Performed by: EMERGENCY MEDICINE

## 2023-09-11 PROCEDURE — 83605 ASSAY OF LACTIC ACID: CPT | Performed by: EMERGENCY MEDICINE

## 2023-09-11 PROCEDURE — 99284 EMERGENCY DEPT VISIT MOD MDM: CPT

## 2023-09-11 PROCEDURE — 87186 SC STD MICRODIL/AGAR DIL: CPT | Performed by: EMERGENCY MEDICINE

## 2023-09-11 PROCEDURE — 36415 COLL VENOUS BLD VENIPUNCTURE: CPT

## 2023-09-11 PROCEDURE — 72110 X-RAY EXAM L-2 SPINE 4/>VWS: CPT | Performed by: NURSE PRACTITIONER

## 2023-09-11 PROCEDURE — 85025 COMPLETE CBC W/AUTO DIFF WBC: CPT | Performed by: EMERGENCY MEDICINE

## 2023-09-11 PROCEDURE — 87150 DNA/RNA AMPLIFIED PROBE: CPT | Performed by: EMERGENCY MEDICINE

## 2023-09-11 PROCEDURE — 81001 URINALYSIS AUTO W/SCOPE: CPT | Performed by: EMERGENCY MEDICINE

## 2023-09-11 PROCEDURE — 87077 CULTURE AEROBIC IDENTIFY: CPT | Performed by: EMERGENCY MEDICINE

## 2023-09-11 PROCEDURE — 87088 URINE BACTERIA CULTURE: CPT | Performed by: EMERGENCY MEDICINE

## 2023-09-11 PROCEDURE — 81001 URINALYSIS AUTO W/SCOPE: CPT

## 2023-09-11 PROCEDURE — 96365 THER/PROPH/DIAG IV INF INIT: CPT

## 2023-09-11 PROCEDURE — 87040 BLOOD CULTURE FOR BACTERIA: CPT | Performed by: EMERGENCY MEDICINE

## 2023-09-11 PROCEDURE — 80053 COMPREHEN METABOLIC PANEL: CPT | Performed by: EMERGENCY MEDICINE

## 2023-09-11 RX ORDER — NITROFURANTOIN 25; 75 MG/1; MG/1
100 CAPSULE ORAL 2 TIMES DAILY
Qty: 20 CAPSULE | Refills: 0 | Status: SHIPPED | OUTPATIENT
Start: 2023-09-11 | End: 2023-09-15

## 2023-09-11 NOTE — ED INITIAL ASSESSMENT (HPI)
Patient to ER from walk in clinic. Patient complains of chills & tremors since last night. Per patient, last time he had these symptoms he was admitted to hospital for a UTI. Patient denies urinary symptoms at this time.

## 2023-09-12 ENCOUNTER — HOSPITAL ENCOUNTER (INPATIENT)
Facility: HOSPITAL | Age: 78
LOS: 3 days | Discharge: HOME | End: 2023-09-15
Attending: EMERGENCY MEDICINE | Admitting: STUDENT IN AN ORGANIZED HEALTH CARE EDUCATION/TRAINING PROGRAM
Payer: MEDICARE

## 2023-09-12 ENCOUNTER — APPOINTMENT (OUTPATIENT)
Dept: CT IMAGING | Facility: HOSPITAL | Age: 78
End: 2023-09-12
Attending: EMERGENCY MEDICINE
Payer: MEDICARE

## 2023-09-12 ENCOUNTER — HOSPITAL ENCOUNTER (INPATIENT)
Facility: HOSPITAL | Age: 78
LOS: 3 days | Discharge: HOME HEALTH | End: 2023-09-15
Attending: EMERGENCY MEDICINE | Admitting: STUDENT IN AN ORGANIZED HEALTH CARE EDUCATION/TRAINING PROGRAM
Payer: MEDICARE

## 2023-09-12 ENCOUNTER — OFFICE VISIT (OUTPATIENT)
Dept: SURGERY | Facility: CLINIC | Age: 78
End: 2023-09-12
Payer: MEDICARE

## 2023-09-12 ENCOUNTER — HOSPITAL ENCOUNTER (INPATIENT)
Facility: HOSPITAL | Age: 78
LOS: 3 days | Discharge: HOME HEALTH | DRG: 690 | End: 2023-09-15
Attending: EMERGENCY MEDICINE | Admitting: STUDENT IN AN ORGANIZED HEALTH CARE EDUCATION/TRAINING PROGRAM
Payer: MEDICARE

## 2023-09-12 ENCOUNTER — TELEPHONE (OUTPATIENT)
Dept: FAMILY MEDICINE CLINIC | Facility: CLINIC | Age: 78
End: 2023-09-12

## 2023-09-12 ENCOUNTER — APPOINTMENT (OUTPATIENT)
Dept: CT IMAGING | Facility: HOSPITAL | Age: 78
DRG: 690 | End: 2023-09-12
Attending: EMERGENCY MEDICINE
Payer: MEDICARE

## 2023-09-12 ENCOUNTER — TELEPHONE (OUTPATIENT)
Dept: SURGERY | Facility: CLINIC | Age: 78
End: 2023-09-12

## 2023-09-12 VITALS — SYSTOLIC BLOOD PRESSURE: 110 MMHG | WEIGHT: 160 LBS | BODY MASS INDEX: 22 KG/M2 | DIASTOLIC BLOOD PRESSURE: 62 MMHG

## 2023-09-12 DIAGNOSIS — S32.010A COMPRESSION FRACTURE OF L1 VERTEBRA, INITIAL ENCOUNTER (HCC): Primary | ICD-10-CM

## 2023-09-12 DIAGNOSIS — R78.81 BACTEREMIA: Primary | ICD-10-CM

## 2023-09-12 DIAGNOSIS — S32.050A COMPRESSION FRACTURE OF L5 LUMBAR VERTEBRA, CLOSED, INITIAL ENCOUNTER (HCC): ICD-10-CM

## 2023-09-12 DIAGNOSIS — Z01.818 PREOP EXAMINATION: Primary | ICD-10-CM

## 2023-09-12 LAB
ANION GAP SERPL CALC-SCNC: 4 MMOL/L (ref 0–18)
BASOPHILS # BLD AUTO: 0.01 X10(3) UL (ref 0–0.2)
BASOPHILS NFR BLD AUTO: 0.1 %
BUN BLD-MCNC: 23 MG/DL (ref 7–18)
CALCIUM BLD-MCNC: 9.2 MG/DL (ref 8.5–10.1)
CHLORIDE SERPL-SCNC: 109 MMOL/L (ref 98–112)
CO2 SERPL-SCNC: 25 MMOL/L (ref 21–32)
CREAT BLD-MCNC: 1.19 MG/DL
EGFRCR SERPLBLD CKD-EPI 2021: 62 ML/MIN/1.73M2 (ref 60–?)
EOSINOPHIL # BLD AUTO: 0.1 X10(3) UL (ref 0–0.7)
EOSINOPHIL NFR BLD AUTO: 1.4 %
ERYTHROCYTE [DISTWIDTH] IN BLOOD BY AUTOMATED COUNT: 12.8 %
GLUCOSE BLD-MCNC: 118 MG/DL (ref 70–99)
HCT VFR BLD AUTO: 33.5 %
HGB BLD-MCNC: 11 G/DL
IMM GRANULOCYTES # BLD AUTO: 0.03 X10(3) UL (ref 0–1)
IMM GRANULOCYTES NFR BLD: 0.4 %
LACTATE SERPL-SCNC: 0.9 MMOL/L (ref 0.4–2)
LYMPHOCYTES # BLD AUTO: 0.46 X10(3) UL (ref 1–4)
LYMPHOCYTES NFR BLD AUTO: 6.7 %
MCH RBC QN AUTO: 32.6 PG (ref 26–34)
MCHC RBC AUTO-ENTMCNC: 32.8 G/DL (ref 31–37)
MCV RBC AUTO: 99.4 FL
MONOCYTES # BLD AUTO: 0.73 X10(3) UL (ref 0.1–1)
MONOCYTES NFR BLD AUTO: 10.6 %
NEUTROPHILS # BLD AUTO: 5.58 X10 (3) UL (ref 1.5–7.7)
NEUTROPHILS # BLD AUTO: 5.58 X10(3) UL (ref 1.5–7.7)
NEUTROPHILS NFR BLD AUTO: 80.8 %
OSMOLALITY SERPL CALC.SUM OF ELEC: 291 MOSM/KG (ref 275–295)
PLATELET # BLD AUTO: 142 10(3)UL (ref 150–450)
POTASSIUM SERPL-SCNC: 3.9 MMOL/L (ref 3.5–5.1)
RBC # BLD AUTO: 3.37 X10(6)UL
SODIUM SERPL-SCNC: 138 MMOL/L (ref 136–145)
WBC # BLD AUTO: 6.9 X10(3) UL (ref 4–11)

## 2023-09-12 PROCEDURE — 1160F RVW MEDS BY RX/DR IN RCRD: CPT | Performed by: NEUROLOGICAL SURGERY

## 2023-09-12 PROCEDURE — 3008F BODY MASS INDEX DOCD: CPT | Performed by: NEUROLOGICAL SURGERY

## 2023-09-12 PROCEDURE — 3074F SYST BP LT 130 MM HG: CPT | Performed by: NEUROLOGICAL SURGERY

## 2023-09-12 PROCEDURE — 99204 OFFICE O/P NEW MOD 45 MIN: CPT | Performed by: NEUROLOGICAL SURGERY

## 2023-09-12 PROCEDURE — 99223 1ST HOSP IP/OBS HIGH 75: CPT | Performed by: STUDENT IN AN ORGANIZED HEALTH CARE EDUCATION/TRAINING PROGRAM

## 2023-09-12 PROCEDURE — 3078F DIAST BP <80 MM HG: CPT | Performed by: NEUROLOGICAL SURGERY

## 2023-09-12 PROCEDURE — 1159F MED LIST DOCD IN RCRD: CPT | Performed by: NEUROLOGICAL SURGERY

## 2023-09-12 PROCEDURE — 74176 CT ABD & PELVIS W/O CONTRAST: CPT | Performed by: EMERGENCY MEDICINE

## 2023-09-12 RX ORDER — METOPROLOL SUCCINATE 25 MG/1
25 TABLET, EXTENDED RELEASE ORAL
Status: DISCONTINUED | OUTPATIENT
Start: 2023-09-13 | End: 2023-09-15

## 2023-09-12 RX ORDER — BISACODYL 10 MG
10 SUPPOSITORY, RECTAL RECTAL
Status: DISCONTINUED | OUTPATIENT
Start: 2023-09-12 | End: 2023-09-15

## 2023-09-12 RX ORDER — CALCIUM CARBONATE 500 MG/1
500 TABLET, CHEWABLE ORAL 3 TIMES DAILY PRN
Status: DISCONTINUED | OUTPATIENT
Start: 2023-09-12 | End: 2023-09-15

## 2023-09-12 RX ORDER — POLYETHYLENE GLYCOL 3350 17 G/17G
17 POWDER, FOR SOLUTION ORAL DAILY PRN
Status: DISCONTINUED | OUTPATIENT
Start: 2023-09-12 | End: 2023-09-15

## 2023-09-12 RX ORDER — ENEMA 19; 7 G/133ML; G/133ML
1 ENEMA RECTAL ONCE AS NEEDED
Status: DISCONTINUED | OUTPATIENT
Start: 2023-09-12 | End: 2023-09-15

## 2023-09-12 RX ORDER — ASPIRIN 81 MG/1
81 TABLET, CHEWABLE ORAL DAILY
Status: DISCONTINUED | OUTPATIENT
Start: 2023-09-13 | End: 2023-09-15

## 2023-09-12 RX ORDER — LOSARTAN POTASSIUM 50 MG/1
50 TABLET ORAL DAILY
Status: DISCONTINUED | OUTPATIENT
Start: 2023-09-12 | End: 2023-09-12

## 2023-09-12 RX ORDER — SENNOSIDES 8.6 MG
17.2 TABLET ORAL NIGHTLY PRN
Status: DISCONTINUED | OUTPATIENT
Start: 2023-09-12 | End: 2023-09-15

## 2023-09-12 RX ORDER — HYDRALAZINE HYDROCHLORIDE 20 MG/ML
5 INJECTION INTRAMUSCULAR; INTRAVENOUS EVERY 6 HOURS PRN
Status: DISCONTINUED | OUTPATIENT
Start: 2023-09-12 | End: 2023-09-15

## 2023-09-12 RX ORDER — ACETAMINOPHEN 500 MG
500 TABLET ORAL EVERY 4 HOURS PRN
Status: DISCONTINUED | OUTPATIENT
Start: 2023-09-12 | End: 2023-09-15

## 2023-09-12 RX ORDER — ENOXAPARIN SODIUM 100 MG/ML
40 INJECTION SUBCUTANEOUS DAILY
Status: DISCONTINUED | OUTPATIENT
Start: 2023-09-13 | End: 2023-09-15

## 2023-09-12 RX ORDER — ECHINACEA PURPUREA EXTRACT 125 MG
1 TABLET ORAL
Status: DISCONTINUED | OUTPATIENT
Start: 2023-09-12 | End: 2023-09-15

## 2023-09-12 RX ORDER — ONDANSETRON 2 MG/ML
4 INJECTION INTRAMUSCULAR; INTRAVENOUS EVERY 6 HOURS PRN
Status: DISCONTINUED | OUTPATIENT
Start: 2023-09-12 | End: 2023-09-15

## 2023-09-12 RX ORDER — LOSARTAN POTASSIUM 50 MG/1
50 TABLET ORAL DAILY
Status: DISCONTINUED | OUTPATIENT
Start: 2023-09-13 | End: 2023-09-15

## 2023-09-12 RX ORDER — HYDROCODONE BITARTRATE AND ACETAMINOPHEN 5; 325 MG/1; MG/1
1-2 TABLET ORAL EVERY 6 HOURS PRN
COMMUNITY
Start: 2023-09-01 | End: 2023-09-15

## 2023-09-12 RX ORDER — MELATONIN
3 NIGHTLY PRN
Status: DISCONTINUED | OUTPATIENT
Start: 2023-09-12 | End: 2023-09-15

## 2023-09-12 RX ORDER — CALCITONIN SALMON 200 [IU]/.09ML
1 SPRAY, METERED NASAL DAILY
Status: DISCONTINUED | OUTPATIENT
Start: 2023-09-13 | End: 2023-09-15

## 2023-09-12 RX ORDER — PANTOPRAZOLE SODIUM 20 MG/1
20 TABLET, DELAYED RELEASE ORAL
Status: DISCONTINUED | OUTPATIENT
Start: 2023-09-13 | End: 2023-09-15

## 2023-09-12 RX ORDER — METOCLOPRAMIDE HYDROCHLORIDE 5 MG/ML
10 INJECTION INTRAMUSCULAR; INTRAVENOUS EVERY 8 HOURS PRN
Status: DISCONTINUED | OUTPATIENT
Start: 2023-09-12 | End: 2023-09-15

## 2023-09-12 RX ORDER — SODIUM CHLORIDE 9 MG/ML
INJECTION, SOLUTION INTRAVENOUS CONTINUOUS
Status: DISCONTINUED | OUTPATIENT
Start: 2023-09-12 | End: 2023-09-14

## 2023-09-12 RX ORDER — ATORVASTATIN CALCIUM 10 MG/1
10 TABLET, FILM COATED ORAL NIGHTLY
Status: DISCONTINUED | OUTPATIENT
Start: 2023-09-12 | End: 2023-09-15

## 2023-09-12 NOTE — ED INITIAL ASSESSMENT (HPI)
Patient  called by ER pharmacist and told to come to ER d/t abnormal blood cultures which were drawn last night.

## 2023-09-12 NOTE — PROGRESS NOTES
ED Culture Callback Results Review  Pharmacist reviewed culture results from ED visit     Positive  blood culture noted which is not optimally treated by previously prescribed Macrobid. Instruction was provided for the patient to return to ED for treatment of blood cultures positive for E coli ESBL with IV antibiotics. Patient verbalized that they will return to ED.       Blood Culture [[id number]] (Abnormal)    Collected: 09/11/23 2039    Updated: 09/12/23 1516    Specimen Source: Blood,peripheral     Blood Culture Smear Gram Negative Rods Abnormal    Blood Culture PCR [[id number]] (Abnormal)    Collected: 09/11/23 2039    Updated: 09/12/23 1516    Specimen Source: Blood,peripheral     Escherichia coli by PCR Detected Abnormal     CTX-M (ESBL gene) by PCR   Detected Abnormal     PCR Comment --       Victorina Bai, Jessica  09/12/23 3:49 PM  Clinical Pharmacist Specialist- Emergency Medicine  [facility] [phone]

## 2023-09-12 NOTE — TELEPHONE ENCOUNTER
You are scheduled for L3-L4 Laminectomies with medial facetectomies on 11.6. 21 with  at [facility].     PCP clearance is needed. We have faxed a request for pre-op clearance to your PCP Dr Primo Hemphill. Please contact their office for appointment. CARDIAC clearance is needed. We have faxed a request for pre-op clearance to your Cardiologist Dr Zayra García. Please contact their office for appointment    You will need  pre operative labs which will include MRSA/MSSA testing. You will need to contact the Pre-admission department at [phone] to schedule an appointment for your labs. The Pre-Admission nurse will review your health history and give you information from the hospital. The nurse will also get you scheduled for all your pre-op testing required for your surgery. Do not take any blood thinning medications such as over the counter non-steroidal antiinflammatories (advil, aleve, ibuprofen etc.), herbal supplements (garlic,tumeric etc.), vitamin E, fish oil or krill oil for at least 7 days prior to surgery. You may only take Tylenol, Extra Strength Tylenol, Arthritis Tylenol, or prescription Norco or Tramadol for pain if something is needed. You should have nothing to eat or drink after 11:00pm the night prior to surgery except for the following:    Do drink 12 ounces of regular Gatorade (NOT RED) 12 hours and 4 hours prior to your scheduled surgery time, Do not drink any other liquids (including water) before your surgery. Do not chew gum or eat candies before surgery. Take 1000 mg of Tylenol (Acetaminophen) 4 hours before your scheduled surgery time, take this with your scheduled Gatorade. In order to prevent infection, you will need to purchase Hibiclens soap and use it after your regular body soap for 5 days prior to your procedure. The last shower should be the night before surgery. This soap can be found at any pharmacy in the first aid section.  See detailed instructions below.      Our office will get prior authorization for surgery through your insurance. Surgery is usually scheduled as a 1 day admission. This is an estimate and varies from person to person. Ultimately, the surgeon will determine when you are ready to be discharged. The hospital will contact you 1-2 days before surgery with your arrival time. If you were on blood thinners (such as Coumadin, Pradaxa, Xarelto, etc) prior to surgery that we had you stop for surgery, please make sure you get instructions about when to resume the medication before you are discharged from the hospital.    Post operative appointments to be made 2 and 6 weeks after surgery. Your 2 week post-op appointment is on 11.21.23 at 9:00 am with BRIGIDO Mae    Your 6 week post-op appointment is on 12.19.23 at 9:00 am with Dr Traci Swenson for after surgery:  Lifting restriction of 10 pounds or less. No bending or twisting. No prolonged sitting or standing. Driving is restricted for the first 1-2 weeks (this will vary from surgeon to surgeon.)  Fusion patients may require bracing such as TLSO or LSO brace. Braces are custom made to fit the patient. Patient's are to keep their incisions covered for the first 3 days post surgery. After that they may leave the incision open to air. It is better for the healing process if the incision is left open to air. May shower after the third day. Do not scrub the incision and avoid any lotions or creams to the incision. Please make sure to arrive at least 15 minutes prior to your scheduled appointment in order to get checked in and roomed in a timely manner. Depending on provider availability, late patients may be required to reschedule.   REFILLS:  After surgery, please remember that we do have a 48 hour refill policy that does not include weekends, please make sure to request your medications in a timely manner so that you do not go days without medication. *Refills should be requested through your pharmacy or through the refill request in [facility] (log in, go to medications, then select refill request). [employer] MESSAGING:  Please remember that our office is closed during the weekend and no one is available for [facility]. If you have an urgent or emergent matter please go to a walk-in center or the emergency room. Also please remember your Mailsuite messages are part of your legal medical record and should not be utilized as a personal email with our providers as it is visible to all [facility] [employer] employees. Also, Since vufind messages are not for emergent matters it may be several days before there is a response to your message. FMLA/PAPERWORK:  If you require FMLA paperwork for your surgery, please make sure to either Drop it off or have it faxed to our office at [phone]. Make sure it has your NAME, , and has your signature. If your signature is not on the form you will need to have a Release of Information on file. To facilitate this process we ask that you requested it at the  on your way out and sign it. Without a signed BRIEN or signature on the form we will not be able to fax it and this will cause a delay with your forms. **We do have a 2 week policy for all forms and paperwork, please make sure to allow plenty of time for completion. Same day paperwork will not be completed. **    Regarding current visitor information:    Please visit the following website for the detailed and up-to-date visitor screening and restriction policy at [phone] [facility].. When speaking with Pre-admissions you will be told about a spine class as it relates to your surgery. Although the class is not mandatory, you are strongly encouraged to attend.  Make sure to bring your surgical binder to the class     The Pre-op Spine Surgery Class is held at [facility] most  from 4:00-5:00 pm.  Call Pre-admission Testing (PAT) to register at:  [phone]. Please park in the [facility] garage, check in at registration and meet by the [employer] tank in [place] for your escort to class on the Ortho Spine unit. If unable to attend, class is available online at [email or web address]. Please call the Care Coordinator, Channing Coffey, with any questions, at [phone]    If you have any questions or concerns please contact our office at [phone] #1 or via [employer] message. Hibiclens Bathing  Hibiclens is a body soap that is used before surgery protect you from getting an infection after surgery   Hibiclens comes in a large blue bottle and can be found in most pharmacies in the [address] with this daily for FIVE consecutive days before surgery, using the entire bottle over the five days. The last shower should be the night before surgery. Steps to bathing with Hibiclens  Do not use Hibiclens on your hair, face or private areas  Wash your hair and face as normal with your usual cleansers  Rinse well  Using a clean wet washcloth apply enough Hibiclens to cover your body. Wash from the neck down avoiding the genital areas and concentrating on the surgical area  Rinse well  Dry yourself with a clean, dry towel  Do not use any powders, creams, lotions or sprays on your body as these attract bacteria  Deodorant and facial creams are acceptable. (Laundering/cleaning: Chlorhexidine gluconate skin cleansers will cause stains if used with chlorine releasing products.  Rinse completely and use only non-chlorine detergents.)    For Office Use Only:    Medical Clearances Needed:  PCP, CARDS- ASA  PA: PRIMO GARCIA PPO  CPT Codes:

## 2023-09-12 NOTE — DISCHARGE INSTRUCTIONS
Please drink plenty of fluids, take antibiotics as prescribed start first thing in the morning, if you continue to have any rigors or if your symptoms worsen you need to immediately return to the emergency department.

## 2023-09-13 ENCOUNTER — TELEPHONE (OUTPATIENT)
Dept: SURGERY | Facility: CLINIC | Age: 78
End: 2023-09-13

## 2023-09-13 LAB
ANION GAP SERPL CALC-SCNC: 5 MMOL/L (ref 0–18)
BASOPHILS # BLD AUTO: 0.03 X10(3) UL (ref 0–0.2)
BASOPHILS NFR BLD AUTO: 0.6 %
BLACTX-M ISLT/SPM QL: DETECTED
BUN BLD-MCNC: 17 MG/DL (ref 7–18)
CALCIUM BLD-MCNC: 8.2 MG/DL (ref 8.5–10.1)
CHLORIDE SERPL-SCNC: 113 MMOL/L (ref 98–112)
CO2 SERPL-SCNC: 24 MMOL/L (ref 21–32)
CREAT BLD-MCNC: 0.99 MG/DL
E COLI DNA BLD POS QL NAA+NON-PROBE: DETECTED
EGFRCR SERPLBLD CKD-EPI 2021: 77 ML/MIN/1.73M2 (ref 60–?)
EOSINOPHIL # BLD AUTO: 0.15 X10(3) UL (ref 0–0.7)
EOSINOPHIL NFR BLD AUTO: 3 %
ERYTHROCYTE [DISTWIDTH] IN BLOOD BY AUTOMATED COUNT: 12.7 %
GLUCOSE BLD-MCNC: 102 MG/DL (ref 70–99)
HCT VFR BLD AUTO: 27.6 %
HGB BLD-MCNC: 9.2 G/DL
IMM GRANULOCYTES # BLD AUTO: 0.02 X10(3) UL (ref 0–1)
IMM GRANULOCYTES NFR BLD: 0.4 %
LYMPHOCYTES # BLD AUTO: 0.68 X10(3) UL (ref 1–4)
LYMPHOCYTES NFR BLD AUTO: 13.7 %
MCH RBC QN AUTO: 33.2 PG (ref 26–34)
MCHC RBC AUTO-ENTMCNC: 33.3 G/DL (ref 31–37)
MCV RBC AUTO: 99.6 FL
MONOCYTES # BLD AUTO: 0.77 X10(3) UL (ref 0.1–1)
MONOCYTES NFR BLD AUTO: 15.6 %
NEUTROPHILS # BLD AUTO: 3.3 X10 (3) UL (ref 1.5–7.7)
NEUTROPHILS # BLD AUTO: 3.3 X10(3) UL (ref 1.5–7.7)
NEUTROPHILS NFR BLD AUTO: 66.7 %
OSMOLALITY SERPL CALC.SUM OF ELEC: 296 MOSM/KG (ref 275–295)
PLATELET # BLD AUTO: 124 10(3)UL (ref 150–450)
POTASSIUM SERPL-SCNC: 3.8 MMOL/L (ref 3.5–5.1)
RBC # BLD AUTO: 2.77 X10(6)UL
SODIUM SERPL-SCNC: 142 MMOL/L (ref 136–145)
WBC # BLD AUTO: 5 X10(3) UL (ref 4–11)

## 2023-09-13 PROCEDURE — 99232 SBSQ HOSP IP/OBS MODERATE 35: CPT | Performed by: HOSPITALIST

## 2023-09-13 RX ORDER — TEMAZEPAM 15 MG/1
15 CAPSULE ORAL NIGHTLY PRN
Status: DISCONTINUED | OUTPATIENT
Start: 2023-09-13 | End: 2023-09-15

## 2023-09-13 NOTE — TELEPHONE ENCOUNTER
Patient is scheduled for L3-L4 Laminectomy with medial facetectomies on 11.6. 21 with Dr Hans Flores at [facility].    Y pre-op apt scheduled (if sx is more than 30 days from last apt)  Y Surgical instructions reviewed by nursing staff with patient  Elisha Vee form completed  Y Surgery order signed   Jayme Payer on surgery sheet  Y Placed on outlook calendar  Y Afrigator [employer] message sent to patient with sx instructions  Y Faxed pre-op clearance request to PCP [id number] [place] [address] Rd letter to prescribing provider requesting anticoagulants be held for surgery  NA E-mail sent to [facility] [address] [place] [address] appointments made  Y PA NEEDED. Routed to PA team to initiate. Y Post-Op outreach pt reminder placed.    Y Entire Neurosurgery Checklist Completed

## 2023-09-13 NOTE — PROGRESS NOTES
NURSING ADMISSION NOTE      Patient admitted via Cart from ER. Oriented to room 301. Safety precautions initiated. Bed in low position. Call light in reach. Patient admitted alert and oriented from ER accompanied by girlfriend. Patient denies pain, admission data obtained, assessment complete, admitting orders received and carried out. Patient made comfortable and updated on plan of care. Vital signs stable.

## 2023-09-13 NOTE — PLAN OF CARE
Problem: Patient/Family Goals  Goal: Patient/Family Long Term Goal  Description: Patient's Long Term Goal: DC home    Interventions:  - comply with plan of care  - See additional Care Plan goals for specific interventions  9/13/2023 1206 by Jory Morrell RN  Outcome: Progressing  9/13/2023 1205 by Jory Morrell RN  Outcome: Progressing  Goal: Patient/Family Short Term Goal  Description: Patient's Short Term Goal: have no further episodes of shakes    Interventions:   - monitor  - See additional Care Plan goals for specific interventions  9/13/2023 1206 by Jory Morrell RN  Outcome: Progressing  9/13/2023 1205 by Jory Morrell RN  Outcome: Progressing     Problem: SAFETY ADULT - FALL  Goal: Free from fall injury  Description: INTERVENTIONS:  - Assess pt frequently for physical needs  - Identify cognitive and physical deficits and behaviors that affect risk of falls.   - Energy fall precautions as indicated by assessment.  - Educate pt/family on patient safety including physical limitations  - Instruct pt to call for assistance with activity based on assessment  - Modify environment to reduce risk of injury  - Provide assistive devices as appropriate  - Consider OT/PT consult to assist with strengthening/mobility  - Encourage toileting schedule  9/13/2023 1206 by Jory Morrell RN  Outcome: Progressing  9/13/2023 1205 by Jory Morrell RN  Outcome: Progressing

## 2023-09-13 NOTE — PROGRESS NOTES
ALert & oriented x4. Denies pain at this time. Tolerated diet. Up in room with standby assist.Ambulation in hallway encouraged. Plan of care discussed with patient. All questions and concerns addressed.

## 2023-09-13 NOTE — ED QUICK NOTES
Orders for admission, patient is aware of plan and ready to go upstairs. Any questions, please call ED RN anna at extension [phone].      Patient Covid vaccination status: Fully vaccinated     COVID Test Ordered in ED: None    COVID Suspicion at Admission: N/A    Running Infusions:  None    Mental Status/LOC at time of transport: x3    Other pertinent information:   CIWA score: N/A   NIH score:  N/A

## 2023-09-13 NOTE — PLAN OF CARE
Problem: bacteremia  Data: Patient alert and oriented overnight, on room air, reports minimal back pain at this time. Patient up in room as with standby assist, voiding freely, vital signs stable. Action: Due medications given, IVF infusing, IV abx overnight, all patient's needs attended to. Education (patient/family): Patient updated on plan of care. Plan for ID consult in AM. DC back home with girlfriend when appropriate. Response: Patient verbalizes understanding of plan of care and appears to be resting comfortably at this time. Problem: Patient/Family Goals  Goal: Patient/Family Long Term Goal  Description: Patient's Long Term Goal: DC home    Interventions:  - comply with plan of care  - See additional Care Plan goals for specific interventions  Outcome: Progressing  Goal: Patient/Family Short Term Goal  Description: Patient's Short Term Goal: have no further episodes of shakes    Interventions:   - monitor  - See additional Care Plan goals for specific interventions  Outcome: Progressing     Problem: SAFETY ADULT - FALL  Goal: Free from fall injury  Description: INTERVENTIONS:  - Assess pt frequently for physical needs  - Identify cognitive and physical deficits and behaviors that affect risk of falls.   - [place] fall precautions as indicated by assessment.  - Educate pt/family on patient safety including physical limitations  - Instruct pt to call for assistance with activity based on assessment  - Modify environment to reduce risk of injury  - Provide assistive devices as appropriate  - Consider OT/PT consult to assist with strengthening/mobility  - Encourage toileting schedule  Outcome: Progressing

## 2023-09-13 NOTE — PLAN OF CARE
Problem: Patient/Family Goals  Goal: Patient/Family Long Term Goal  Description: Patient's Long Term Goal: DC home    Interventions:  - comply with plan of care  - See additional Care Plan goals for specific interventions  Outcome: Progressing  Goal: Patient/Family Short Term Goal  Description: Patient's Short Term Goal: have no further episodes of shakes    Interventions:   - monitor  - See additional Care Plan goals for specific interventions  Outcome: Progressing     Problem: SAFETY ADULT - FALL  Goal: Free from fall injury  Description: INTERVENTIONS:  - Assess pt frequently for physical needs  - Identify cognitive and physical deficits and behaviors that affect risk of falls.   - [place] fall precautions as indicated by assessment.  - Educate pt/family on patient safety including physical limitations  - Instruct pt to call for assistance with activity based on assessment  - Modify environment to reduce risk of injury  - Provide assistive devices as appropriate  - Consider OT/PT consult to assist with strengthening/mobility  - Encourage toileting schedule  Outcome: Progressing

## 2023-09-13 NOTE — TELEPHONE ENCOUNTER
Patient admitted for bacteremia. Has chronic R UPJ, stable renal function and seems to be clinically improving. Discussed with hospitalist.  Marce Raymundo with no surgical intervention unless fails to medically improve which at that time he will consult us for evaluation and stent placement. If ongoing improvement plan to d'c on antibiotic per ID services and follow up outpatient as scheduled with Dr. Kierra Nation.     Future Appointments   Date Time Provider Ike Serrano   9/22/2023  8:45 AM Traci Reyes MD ENIPain [facility] [place]   9/25/2023  1:30 PM Zoltan Simental MD [facility] EC [address]   10/10/2023  9:00 AM Azul Montoya MD EMG [address] [facility] [address]   10/18/2023  8:15 AM BK XR RM1 [facility] [address]   10/19/2023  9:00 AM Ruddy Spicer MD ENINAPER2 [facility] [place]   11/21/2023  9:00 AM ELVIN Russell ENINAPER2 [facility] [place]   12/19/2023  9:00 AM Ruddy Spicer MD ENINAPER2 [facility] [place]

## 2023-09-14 PROCEDURE — 99232 SBSQ HOSP IP/OBS MODERATE 35: CPT | Performed by: HOSPITALIST

## 2023-09-14 RX ORDER — ERTAPENEM 1 G/1
1 INJECTION, POWDER, LYOPHILIZED, FOR SOLUTION INTRAMUSCULAR; INTRAVENOUS DAILY
Qty: 14 EACH | Refills: 0 | Status: SHIPPED | OUTPATIENT
Start: 2023-09-14 | End: 2023-09-28

## 2023-09-14 NOTE — CM/SW NOTE
Met w/ pt and his wife to discuss DC planning/ options for antibiotics. Pt would be covered for IOI at 85% until pt's out of pocket is met ($1300) Pt has currently met $479.70. Pt would owe $16/day until out of pocket met. Pt/wife prefer to have home antibiotics w/ C. [facility] is able to accept pt. [facility] is able to provide IV antibiotics. Awaiting verification of pt's benefits to get pt's out of pocket cost for home infusion. Pt and his wife report the cost will be the final factor in their choice. CM/SW will follow up w/ cost of home infusion to finalize DC abx plan.     ROOSEVELT LiebermanN, VIA [facility]    [phone]

## 2023-09-14 NOTE — PROGRESS NOTES
A&Ox4. VSS. RA, afebrile. GI: Abdomen soft, nondistended. : Voids. Pain controlled with PRN pain medications  Up with standby assist.  Diet:tolerating general  IVF running per order. All appropriate safety measures in place. All questions and concerns addressed.  Will continue to monitor

## 2023-09-14 NOTE — PLAN OF CARE
Pt vitals stable, A&O x4. Pt denied chest pain, shortness of breath, and calf pain. Pt denied pain at this time. PRN meds given for sleep. Ivf infusing. Tolerating diet. Bed locked in low position, call light in reach, rounding provided.

## 2023-09-14 NOTE — CM/SW NOTE
Orders received to coordinate IV antibiotics for DC. Infusion and home health referrals sent via [facility].  Pt is pending line placement and final prescription, but likely plan of Invanz communicated in referral.     SW/CM will follow up to finalize arrangements prior to BISI Calabrese, VIA [facility]    [phone]

## 2023-09-15 VITALS
RESPIRATION RATE: 18 BRPM | WEIGHT: 163 LBS | DIASTOLIC BLOOD PRESSURE: 49 MMHG | TEMPERATURE: 98 F | SYSTOLIC BLOOD PRESSURE: 114 MMHG | HEIGHT: 72 IN | HEART RATE: 77 BPM | BODY MASS INDEX: 22.08 KG/M2 | OXYGEN SATURATION: 94 %

## 2023-09-15 PROCEDURE — 99239 HOSP IP/OBS DSCHRG MGMT >30: CPT | Performed by: HOSPITALIST

## 2023-09-15 NOTE — HOME CARE LIAISON
Received referral via [facility] for Home Health services.  Spoke w/ patient at the bedside along iwht his partner and is agreeable for home health care

## 2023-09-15 NOTE — PLAN OF CARE
Pt a&o x 4. Cheerful & cooperative w/ care  Wife @ bedside. Participating & helpful with plan of care  Picc line placed to right arm this am  Voids w/out difficulty  Tolerating diet  Denies pain  Up ad marcelo.   Pt has good safety awareness  Plan of care:  home today w/ PICC & Invanz  SW following

## 2023-09-15 NOTE — CM/SW NOTE
LEROY spoke with Ascencion Salinas at [facility] regarding Abigail Devlin stated cost for [facility] will be $85.35/week. Pt will need Invanz for two weeks and total cost would estimate to $170.70. Ascencion Salinas stated if pt meets his deductible, co-pay will be $10/week. LEROY met with pt and pt's spouse at bedside and provided co-pay information for Invanz of $85.35/week. Pt and pt's spouse in agreement with co-pay and prefer home w/ HH. LEROY messaged [facility] liaison for [facility] tomorrow, pt is potentially discharging today. [facility] liaison stated SOC has been scheduled for tomorrow 9/16/23 in the morning. Pt will have Midline placed today. Pt will need a dose of IV ABX prior to discharge. [facility] and [facility] contact information placed on AVS. LEROY will continue to follow. Addendum: LEROY attached IV assessment to [address] [place] [address] referral and RN stated pt received dose of Invanz. LEROY updated Ascencion Salinas at [facility]. Ascencion Salinas stated she will reach out to pt to coordinate delivery of IV ABX for today. Updated RN.      CHELSEA ShoemakerW  Discharge Planner

## 2023-09-15 NOTE — DISCHARGE INSTRUCTIONS
Sometimes managing your health at home requires assistance. The [facility] team has recognized your preference to use [facility]. They can be reached by phone at [phone]. The fax number for your reference is [phone]. . A representative from the home health agency will contact you or your family to schedule your first visit. [facility]  [phone]        Eat a heart healthy diet  Stay well hydrated  No strenuous activities  No lifting anything over 10#'s until your follow up appointment with urology  Remember to use good hand washing @ home        It has been a pleasure taking care of you! Best wishes for a speedy recovery!   Ese WALTERS

## 2023-09-15 NOTE — PLAN OF CARE
Pt discharged home. Discharge instructions given to pt & pt's wife, including:   Rx for invanz given to pt's wife  Review of home meds to continue  Diet & hydration  Midline IV care, hygiene  HH & [facility] for iv abx administration  Follow up care. Pt & wife verbalized understanding of all instructions  Left unit stable via w/c.

## 2023-09-15 NOTE — PLAN OF CARE
A&Ox4. VSS. RA. Denies chest pain and SOB. GI: Abdomen soft, nondistended. Passing gas. Denies nausea. : Voids. Denies pain   Up ad marcelo   Diet:  Regular  IVF running [address]  IV abx given  Melatonin and Temazepam given for sleep  All appropriate safety measures in place. All questions and concerns addressed.     Waiting on results of 2nd blood culture to determine plan of care

## 2023-09-18 ENCOUNTER — PATIENT OUTREACH (OUTPATIENT)
Dept: CASE MANAGEMENT | Age: 78
End: 2023-09-18

## 2023-09-18 DIAGNOSIS — Z02.9 ENCOUNTERS FOR UNSPECIFIED ADMINISTRATIVE PURPOSE: Primary | ICD-10-CM

## 2023-09-19 ENCOUNTER — LAB ENCOUNTER (OUTPATIENT)
Dept: LAB | Age: 78
End: 2023-09-19
Attending: INTERNAL MEDICINE
Payer: MEDICARE

## 2023-09-21 ENCOUNTER — LAB REQUISITION (OUTPATIENT)
Dept: LAB | Facility: HOSPITAL | Age: 78
End: 2023-09-21
Payer: MEDICARE

## 2023-09-21 DIAGNOSIS — N39.0 URINARY TRACT INFECTION, SITE NOT SPECIFIED: ICD-10-CM

## 2023-09-21 PROCEDURE — 87040 BLOOD CULTURE FOR BACTERIA: CPT | Performed by: INTERNAL MEDICINE

## 2023-09-22 ENCOUNTER — OFFICE VISIT (OUTPATIENT)
Dept: PAIN CLINIC | Facility: CLINIC | Age: 78
End: 2023-09-22
Payer: MEDICARE

## 2023-09-22 ENCOUNTER — TELEPHONE (OUTPATIENT)
Dept: PAIN CLINIC | Facility: CLINIC | Age: 78
End: 2023-09-22

## 2023-09-22 VITALS
SYSTOLIC BLOOD PRESSURE: 100 MMHG | HEART RATE: 68 BPM | DIASTOLIC BLOOD PRESSURE: 58 MMHG | WEIGHT: 163 LBS | BODY MASS INDEX: 22 KG/M2 | OXYGEN SATURATION: 98 %

## 2023-09-22 DIAGNOSIS — S32.000A LUMBAR COMPRESSION FRACTURE, CLOSED, INITIAL ENCOUNTER (HCC): ICD-10-CM

## 2023-09-22 DIAGNOSIS — S32.010A COMPRESSION FRACTURE OF L1 VERTEBRA, INITIAL ENCOUNTER (HCC): Primary | ICD-10-CM

## 2023-09-22 PROCEDURE — 1159F MED LIST DOCD IN RCRD: CPT | Performed by: ANESTHESIOLOGY

## 2023-09-22 PROCEDURE — 99215 OFFICE O/P EST HI 40 MIN: CPT | Performed by: ANESTHESIOLOGY

## 2023-09-22 PROCEDURE — 3074F SYST BP LT 130 MM HG: CPT | Performed by: ANESTHESIOLOGY

## 2023-09-22 PROCEDURE — 1111F DSCHRG MED/CURRENT MED MERGE: CPT | Performed by: ANESTHESIOLOGY

## 2023-09-22 PROCEDURE — 3078F DIAST BP <80 MM HG: CPT | Performed by: ANESTHESIOLOGY

## 2023-09-22 NOTE — PROGRESS NOTES
Patient presents in office today with reported pain in lumbar spine    Current pain level reported = 7/10    Last reported dose of dual action advil, meloxicam today      Narcotic Contract renewal none    Urine Drug screen none

## 2023-09-22 NOTE — TELEPHONE ENCOUNTER
Completed [employer] Pre Cert Form for L1 and L5 Kyphoplasty ([id number]) attached clinical information faxed to [phone] [email or web address]    Pending Case #: [id number]

## 2023-09-22 NOTE — TELEPHONE ENCOUNTER
Spoke with Jose Mora at [address] [place] [address] [facility] Dept [phone]  Pending Case #: [id number]    time: 29:13     Case created for L1 and L5 Kyphoplasty ([id number]) , per Jose Mora will be receiving PA Form that needs to be completed and attached with clinical faxed back to [facility] Dept at Fax #: [phone].

## 2023-09-25 ENCOUNTER — OFFICE VISIT (OUTPATIENT)
Dept: SURGERY | Facility: CLINIC | Age: 78
End: 2023-09-25

## 2023-09-25 DIAGNOSIS — N40.1 BPH WITH OBSTRUCTION/LOWER URINARY TRACT SYMPTOMS: ICD-10-CM

## 2023-09-25 DIAGNOSIS — N13.8 BPH WITH OBSTRUCTION/LOWER URINARY TRACT SYMPTOMS: ICD-10-CM

## 2023-09-25 DIAGNOSIS — R35.1 NOCTURIA: ICD-10-CM

## 2023-09-25 DIAGNOSIS — R82.90 URINE FINDING: Primary | ICD-10-CM

## 2023-09-25 DIAGNOSIS — N13.30 HYDRONEPHROSIS OF RIGHT KIDNEY: ICD-10-CM

## 2023-09-25 DIAGNOSIS — N39.0 RECURRENT UTI: ICD-10-CM

## 2023-09-25 DIAGNOSIS — N39.43 POST-VOID DRIBBLING: ICD-10-CM

## 2023-09-25 LAB
APPEARANCE: CLEAR
BILIRUBIN: NEGATIVE
GLUCOSE (URINE DIPSTICK): NEGATIVE MG/DL
KETONES (URINE DIPSTICK): NEGATIVE MG/DL
LEUKOCYTES: NEGATIVE
MULTISTIX LOT#: NORMAL NUMERIC
NITRITE, URINE: NEGATIVE
OCCULT BLOOD: NEGATIVE
PH, URINE: 5.5 (ref 4.5–8)
PROTEIN (URINE DIPSTICK): NEGATIVE MG/DL
SPECIFIC GRAVITY: 1.01 (ref 1–1.03)
URINE-COLOR: YELLOW
UROBILINOGEN,SEMI-QN: 0.2 MG/DL (ref 0–1.9)

## 2023-09-25 PROCEDURE — 1160F RVW MEDS BY RX/DR IN RCRD: CPT | Performed by: UROLOGY

## 2023-09-25 PROCEDURE — 51798 US URINE CAPACITY MEASURE: CPT | Performed by: UROLOGY

## 2023-09-25 PROCEDURE — 1111F DSCHRG MED/CURRENT MED MERGE: CPT | Performed by: UROLOGY

## 2023-09-25 PROCEDURE — 81003 URINALYSIS AUTO W/O SCOPE: CPT | Performed by: UROLOGY

## 2023-09-25 PROCEDURE — 1159F MED LIST DOCD IN RCRD: CPT | Performed by: UROLOGY

## 2023-09-25 PROCEDURE — 99204 OFFICE O/P NEW MOD 45 MIN: CPT | Performed by: UROLOGY

## 2023-09-25 RX ORDER — FINASTERIDE 5 MG/1
5 TABLET, FILM COATED ORAL DAILY
Qty: 90 TABLET | Refills: 6 | Status: SHIPPED | OUTPATIENT
Start: 2023-09-25

## 2023-09-25 RX ORDER — TAMSULOSIN HYDROCHLORIDE 0.4 MG/1
0.4 CAPSULE ORAL EVERY EVENING
Qty: 90 CAPSULE | Refills: 6 | Status: SHIPPED | OUTPATIENT
Start: 2023-09-25

## 2023-09-25 NOTE — PROGRESS NOTES
HPI:     Alanna Metcalf is a [de-identified]year old male with a PMH of HTN, HL, GERD OA. He presents as a consult with:  1. BPH/LUTS + h/o bladder stones  - s/p urolift and cystolithopaxy (Dolores Carvajal) 3/22/22  - carlos/pro since ~ 2014, stopped ~ 2022  2. chronic right UPJ obstruction  - noted on CT in 2012 and stable  3. Recurrent MDR UTIs  - UCx 9/11/23, 11/21/22 MDR E coli S to gent, macrobid, zosyn  3. ED  - 100 mg viagra prn  4. Fam h/o CaP  - dad in 62s tx with XRT    PCP - Chrystal  Prior Urologist - Dolores Carvajal 4/6/22    Presents to establish care. Discussed recurrent UTIs, worsening LUTS, nocturia, PVD. Had urolift in March 2022, stopped carlos/pro and developed 2 septic UTIs afterwards requiring hospitalization. No prior UTIs. He feels well today. Was admitted from 9/12/23 - 9/16/23 with UTI, bacteremia. He had severe right flank pain while admitted but has two fractured vertebrae which may have been contributing. Had another UTI in Nov 2022. Takes losartan and meloxicam in the morning. Not exercising d/t back pain. He has back surgery scheduled in November provided he doesn't get another UTI. Prior BPH/OAB meds: stopped carlos/pro ~ 2022 and urinary symptoms are stable    AUA SS is 17/35 with 4 n; 3 w, I, FERNANDO; 2 u; 1 f. Unhappy with LUTS. Incontinence: PVD which developed after urolift and goes through 1 PPD and damp  Pensocrotal: no abnormalities  BILLY: ~ 40 g prostate, no nodules or tenderness    UA is negative and PVR is zero    Hip/back pain: yes - b/l  Diarrhea/constipation: none  UTI hx: 2 since urolift.  Nothing prior  Gross hematuria: 5-6 y ago  Tobacco hx: 30 pack years, quit 2006  Kidney stone hx: none    < 5 % potency without meds and <30 % potency with 100 mg viagra prn and may want coupon later    PSA 2.33 7/12/23    CT SP 9/12/23: chronic, low lying right kidney with probable chronic mild-mod right UPJO, stable from CT in Nov 2022 and stable from CT report 1/9/12    Drinks no plain water, 40-60 dilute tea oz water with clear urine. I encouraged the pt drink at least 40-60 oz water per day or enough to keep urine clear to pale yellow for UTI prevention. We discussed Kegel exercises for incontinence. I provided and reviewed educational materials for this. Also discussed PFT as an option. We discussed resuming both flomax and proscar as options for LUTS and reviewed SEs (including low risk of hypotension with flomax and possible sexual side effects with both medications). He would like to resume both. Discussed the right hydronephrosis appears stable since at least 2012. As symptoms worsened since urolift I would recommend cysto at some point in the near future to determine if foreign bodies are noted within the lumen that could be contributing to issues. For nocturia, I'd recommend the patient drink plenty of fluids first thing in the morning and avoid drinking anything at least 2-3 hours prior to bedtime. If the patient takes diuretic I would recommend they take them first thing in the morning. If the patient takes NSAIDs I would consider they switch to taking prior to bedtime. I'd also encourage regular physical activity (for at least 30 minutes at least three times per week) as this has been shown to help with nocturia. Finally we discussed that setting a regular bedtime can help regulate nocturnal levels of melatonin and ADH, which can help with sleep and reduce nocturia. He will significantly increase water intake and cut back on tea for adequate hydration. Start exercising and consider switching meloxicam to qhs for nocturia with fluid adjustments. Resume flomax, proscar. Start Marana Party and recommend he eventually go through PFT for PVD. Continue 100 mg viagra prn. Office cysto and urinary symptom check in a few weeks. Consider TURP later after back surgery if urinary symptoms fail to improve with above strategies.     HISTORY:  Past Medical History:   Diagnosis Date    Adverse drug reaction 12/10/2014    Age-related cataract of both eyes 2021    Arthritis 2010    Back pain 2010    Benign neoplasm of colon     BPH (benign prostatic hyperplasia) 2013    Calculus of kidney ? Diverticulosis of colon (without mention of hemorrhage)     Easy bruising 2010    Flatulence/gas pain/belching occasional    Frequent urination     Frequent use of laxatives occasional    Heartburn ? Hemorrhoids ? History of cardiac murmur ? Hx of heart artery stent     Prostate nodule 2016    Had biopsy 2016    Stented coronary artery ?    one    Unspecified hemorrhoids without mention of complication     Wears glasses 2020      Past Surgical History:   Procedure Laterality Date    ANGIOPLASTY (CORONARY)  [address]    CATARACT Bilateral 2021    cataract removed    COLONOSCOPY  2012    + polyps    INSERT INTRACORONARY STENT      OTHER SURGICAL HISTORY  2022    Cystoscopy Dr. Penny Carroll  2012    esophagitis, repeat in 5 year      Family History   Problem Relation Age of Onset    Breast Cancer Sister     Other (CABG) Father         x4    Other (esophogeal reflux) Mother     Other (hip replacement) Mother       Social History:   Social History     Socioeconomic History    Marital status: Life Partner   Tobacco Use    Smoking status: Former     Packs/day: 1.00     Years: 40.00     Additional pack years: 0.00     Total pack years: 40.00     Types: Cigarettes     Start date: 1968     Quit date: 2011     Years since quittin.3    Smokeless tobacco: Never   Vaping Use    Vaping Use: Never used   Substance and Sexual Activity    Alcohol use: No     Comment: Quit in . Prior had been a heavier drinker.     Drug use: Not Currently     Types: Cannabis   Other Topics Concern    Caffeine Concern Yes    Exercise Yes     Comment: 6x/week   Social Determinants of Health  Financial Resource Strain: Low Risk  (9/18/2023)      Financial Resource Strain          Difficulty of Paying Living Expenses: Not very hard          Med Affordability: No  Transportation Needs: No Transportation Needs (9/18/2023)      Transportation Needs          Lack of Transportation: No       Medications (Active prior to today's visit):  Current Outpatient Medications   Medication Sig Dispense Refill    finasteride 5 MG Oral Tab Take 1 tablet (5 mg total) by mouth daily. 90 tablet 6    tamsulosin 0.4 MG Oral Cap Take 1 capsule (0.4 mg total) by mouth every evening. 90 capsule 6    Ibuprofen-Acetaminophen (ADVIL DUAL ACTION OR) Take by mouth.      ertapenem 1 g Injection Recon Soln Inject 1 g into the vein daily for 14 days. Will need weekly labs with CBC and BMP while on this medication. 14 each 0    calcitonin, salmon, 200 UNIT/ACT Nasal Solution 1 spray by Nasal route daily. 1 each 2    sertraline 50 MG Oral Tab Take 1 tablet (50 mg total) by mouth daily. 90 tablet 1    salmeterol 50 MCG/ACT Inhalation Aerosol Powder, Breath Activated Inhale 1 puff into the lungs 2 (two) times daily. 3 each 1    Meloxicam 15 MG Oral Tab Take 1 tablet (15 mg total) by mouth daily. 90 tablet 3    metoprolol succinate ER 25 MG Oral Tablet 24 Hr Take 1 tablet (25 mg total) by mouth daily. 90 tablet 3    Sildenafil Citrate 100 MG Oral Tab Take 1 tablet (100 mg total) by mouth as needed for Erectile Dysfunction. 4 tablet 11    simvastatin 20 MG Oral Tab TAKE 1 TABLET BY MOUTH EVERY NIGHT AT BEDTIME 90 tablet 3    losartan 50 MG Oral Tab Take 1 tablet (50 mg total) by mouth daily. 90 tablet 3    Omeprazole Magnesium 20 MG Oral Tab EC Take 1 tablet (20 mg total) by mouth daily. 90 tablet 3    Aspirin 81 MG Oral Tab Take 1 tablet (81 mg total) by mouth daily. Allergies:  No Known Allergies      ROS:     A comprehensive 10 point review of systems was completed.   Pertinent positives and negatives noted in the the HPI. PHYSICAL EXAM:     GENERAL APPEARANCE: well, developed, well nourished, in no acute distress  NEUROLOGIC: nonfocal, alert and oriented  HEAD: normocephalic, atraumatic  EYES: sclera non-icteric  EARS: hearing intact  ORAL CAVITY: mucosa moist  NECK/THYROID: no obvious goiter or masses  LUNGS: nonlabored breathing  ABDOMEN: soft, no obvious masses or tenderness  SKIN: no obvious rashes    : as noted above    ASSESSMENT/PLAN:   Diagnoses and all orders for this visit:    Urine finding  -     URINALYSIS, AUTO, W/O SCOPE    BPH with obstruction/lower urinary tract symptoms  -     finasteride 5 MG Oral Tab; Take 1 tablet (5 mg total) by mouth daily. -     tamsulosin 0.4 MG Oral Cap; Take 1 capsule (0.4 mg total) by mouth every evening. Recurrent UTI    Nocturia    Post-void dribbling    Hydronephrosis of right kidney      - as noted above. Thanks again for this consult.     Patricio Banuelos MD, [address]  Urologist  [facility]  Office: [phone]

## 2023-09-25 NOTE — PATIENT INSTRUCTIONS
1. Increase water intake to 40-60 ounces (2 liters) per day or enough to keep urine clear to pale yellow. 2. Cut back on dietary irritants such as coffee, tea, soda, juice, spicy foods, citrus fruits/juices. 3. Resume tamsulosin and finasteride  4. Start Kegels  5. Recommend pelvic floor therapy    Ways to Reduce Nocturia (voiding frequently at night):    Try to drink plenty of water first thing in the morning. Avoid drinking anything at least 2-3 hours before bedtime. Unless you are on a fluid-restriction we typically recommend drinking 40-60 ounces water per day. Avoid/limit dietary irritants such as alcohol, juice, sugary things, citrus fruits, soda, carbonated beverages, coffee (including decaf), tea, spicy foods. Try to exercise at least 3 times per week for at least 30 minutes at a time. We recommend cardiovascular exercise such as jogging, elliptical, biking, speed-walking. Go to bed around the same time every night. This helps maintain normal nightly levels of ADH and melatonin - both of which are needed for a good night's sleep. Practice good sleep hygiene - Try to only use your bed for sleeping and sex. You should specifically try to avoid doing the following in bed: eating/drinking, reading, watching TV, or using your laptop/phone. If you take a diuretic, you may benefit from taking this in the morning rather than the evening. Talk to your PCP if you do take a diuretic at night to see if you can switch. If you take NSAIDs (such as motrin, aleve, ibuprofen, naproxen) you may benefit from taking this at night rather than the morning. These medications tell your kidneys not to work as hard for a few hours. I would NOT recommend taking extra doses of these medications just to sleep better at night. Fluid accumulation in the lower extremities that gets reabsorbed into the circulation at night is another cause for nocturnal polyuria.  If you accumulate fluid in your legs we recommend you try to ambulate/move around as much as safely possible and avoid prolonged sitting. If you are sitting we would also recommend you elevate your legs to prevent fluid from accumulating.

## 2023-09-26 ENCOUNTER — LAB REQUISITION (OUTPATIENT)
Dept: LAB | Facility: HOSPITAL | Age: 78
End: 2023-09-26
Payer: MEDICARE

## 2023-09-26 DIAGNOSIS — N39.0 URINARY TRACT INFECTION, SITE NOT SPECIFIED: ICD-10-CM

## 2023-09-26 LAB
ANION GAP SERPL CALC-SCNC: 5 MMOL/L (ref 0–18)
BASOPHILS # BLD AUTO: 0.05 X10(3) UL (ref 0–0.2)
BASOPHILS NFR BLD AUTO: 0.8 %
BUN BLD-MCNC: 26 MG/DL (ref 7–18)
CALCIUM BLD-MCNC: 9.3 MG/DL (ref 8.5–10.1)
CHLORIDE SERPL-SCNC: 112 MMOL/L (ref 98–112)
CO2 SERPL-SCNC: 22 MMOL/L (ref 21–32)
CREAT BLD-MCNC: 1.22 MG/DL
EGFRCR SERPLBLD CKD-EPI 2021: 60 ML/MIN/1.73M2 (ref 60–?)
EOSINOPHIL # BLD AUTO: 0.26 X10(3) UL (ref 0–0.7)
EOSINOPHIL NFR BLD AUTO: 4.3 %
ERYTHROCYTE [DISTWIDTH] IN BLOOD BY AUTOMATED COUNT: 12.2 %
GLUCOSE BLD-MCNC: 149 MG/DL (ref 70–99)
HCT VFR BLD AUTO: 32.7 %
HGB BLD-MCNC: 10.4 G/DL
IMM GRANULOCYTES # BLD AUTO: 0.04 X10(3) UL (ref 0–1)
IMM GRANULOCYTES NFR BLD: 0.7 %
LYMPHOCYTES # BLD AUTO: 0.93 X10(3) UL (ref 1–4)
LYMPHOCYTES NFR BLD AUTO: 15.6 %
MCH RBC QN AUTO: 31.9 PG (ref 26–34)
MCHC RBC AUTO-ENTMCNC: 31.8 G/DL (ref 31–37)
MCV RBC AUTO: 100.3 FL
MONOCYTES # BLD AUTO: 0.49 X10(3) UL (ref 0.1–1)
MONOCYTES NFR BLD AUTO: 8.2 %
NEUTROPHILS # BLD AUTO: 4.21 X10 (3) UL (ref 1.5–7.7)
NEUTROPHILS # BLD AUTO: 4.21 X10(3) UL (ref 1.5–7.7)
NEUTROPHILS NFR BLD AUTO: 70.4 %
OSMOLALITY SERPL CALC.SUM OF ELEC: 296 MOSM/KG (ref 275–295)
PLATELET # BLD AUTO: 348 10(3)UL (ref 150–450)
POTASSIUM SERPL-SCNC: 4.7 MMOL/L (ref 3.5–5.1)
RBC # BLD AUTO: 3.26 X10(6)UL
SODIUM SERPL-SCNC: 139 MMOL/L (ref 136–145)
WBC # BLD AUTO: 6 X10(3) UL (ref 4–11)

## 2023-09-26 PROCEDURE — 85025 COMPLETE CBC W/AUTO DIFF WBC: CPT | Performed by: INTERNAL MEDICINE

## 2023-09-26 PROCEDURE — 80048 BASIC METABOLIC PNL TOTAL CA: CPT | Performed by: INTERNAL MEDICINE

## 2023-09-27 ENCOUNTER — TELEPHONE (OUTPATIENT)
Dept: FAMILY MEDICINE CLINIC | Facility: CLINIC | Age: 78
End: 2023-09-27

## 2023-09-29 NOTE — TELEPHONE ENCOUNTER
Prior Authorization for outpatient surgery initiated with Keron Amaya at [address] [place] [address]. Requesting coverage for: L3-L4 Laminectomy with medial facetectomies   Date of Service: 11/6/2023   Inpatient days requested:outpatient  CPT codes: 64342,92128    Request for surgery pending review, pending reference #[id number]. Received [employer] form back from nursing 10/3/23 fax to [phone].  Confirmed received

## 2023-10-02 ENCOUNTER — TELEPHONE (OUTPATIENT)
Dept: PAIN CLINIC | Facility: CLINIC | Age: 78
End: 2023-10-02

## 2023-10-02 NOTE — TELEPHONE ENCOUNTER
Received OV Note from [facility] JUDIE Logan 9/26/23. Endorsed to RN for Provider review.

## 2023-10-03 NOTE — TELEPHONE ENCOUNTER
Nurse reviewer Savita Renae called and said the Medical director has denied this claim for patient not having completed 6 weeks of recent PT by a licensed physical therapist.     P2P can be scheduled by calling [phone] #4    The P2P has to be completed by 10/5/23 before 2:30 est.case #[id number]    After that date of 10/5/23 a written appeal would have to be done by calling [phone] or faxing to [phone] both phone # and fax is for an expedited appeal

## 2023-10-04 NOTE — TELEPHONE ENCOUNTER
Called the Number listed below.      Scheduled P2P for 10/5/2023 10:30-1pm Central Standard Time with Dr. Woo Singh     Provider aware   Placed in [place]

## 2023-10-04 NOTE — TELEPHONE ENCOUNTER
ID presurgical clearance received per OV note dated 9.26.23, \"Pt needs to undergo kyphoplasty in the near future, no contraindication from my standpoint if no UTI symptoms, and once done with IV abx\"    Faxed to [facility]; sent to scan; copy placed in ppwrk sent to scan folder

## 2023-10-05 ENCOUNTER — TELEPHONE (OUTPATIENT)
Dept: SURGERY | Facility: CLINIC | Age: 78
End: 2023-10-05

## 2023-10-05 NOTE — TELEPHONE ENCOUNTER
Peer to peer with [facility]  Patient needs an updated note from physical therapy  He is done physical therapy in the past was 23 months ago and they would need a new note saying he has been seen by physical therapy now and that further physical therapy would not be beneficial  I gave the report to my nurses  We will attempt to get patient to physical therapy  This patient needs to be done by the end of the day on Monday for approval otherwise will have to be submitted again

## 2023-10-05 NOTE — TELEPHONE ENCOUNTER
Per Dr. Ralph Coffman note on 10/5/2023:  \"Peer to peer with [facility]  Patient needs an updated note from physical therapy  He is done physical therapy in the past was 23 months ago and they would need a new note saying he has been seen by physical therapy now and that further physical therapy would not be beneficial  I gave the report to my nurses  We will attempt to get patient to physical therapy  This patient needs to be done by the end of the day on Monday for approval otherwise will have to be submitted again\"    Called [facility] at [phone] to inform of their surgeons recommendations. And if the note is granted, then it will be faxed to the expedited appeal line at [phone]. Expedited appeal  number is actually   [phone]    Called the patient to inform he will need to go to a PT by Monday to be deemed a unfit for PT No answer     Verbal Release verified. Called pt daughter  Fortino Bernal to inform. Fortino Bernal acknowledged. Call was ended.

## 2023-10-06 ENCOUNTER — HOSPITAL ENCOUNTER (OUTPATIENT)
Dept: GENERAL RADIOLOGY | Age: 78
Discharge: HOME | End: 2023-10-06
Attending: FAMILY MEDICINE
Payer: MEDICARE

## 2023-10-06 ENCOUNTER — LABORATORY ENCOUNTER (OUTPATIENT)
Dept: LAB | Age: 78
End: 2023-10-06
Attending: FAMILY MEDICINE
Payer: MEDICARE

## 2023-10-06 DIAGNOSIS — Z01.818 PREOP EXAMINATION: ICD-10-CM

## 2023-10-06 LAB
ALBUMIN SERPL-MCNC: 3.2 G/DL (ref 3.4–5)
ALBUMIN/GLOB SERPL: 0.8 {RATIO} (ref 1–2)
ALP LIVER SERPL-CCNC: 109 U/L
ALT SERPL-CCNC: 55 U/L
ANION GAP SERPL CALC-SCNC: 4 MMOL/L (ref 0–18)
ANTIBODY SCREEN: NEGATIVE
APTT PPP: 34.3 SECONDS (ref 23.3–35.6)
AST SERPL-CCNC: 30 U/L (ref 15–37)
BASOPHILS # BLD AUTO: 0.03 X10(3) UL (ref 0–0.2)
BASOPHILS NFR BLD AUTO: 0.6 %
BILIRUB SERPL-MCNC: 0.3 MG/DL (ref 0.1–2)
BUN BLD-MCNC: 35 MG/DL (ref 7–18)
CALCIUM BLD-MCNC: 8.9 MG/DL (ref 8.5–10.1)
CHLORIDE SERPL-SCNC: 111 MMOL/L (ref 98–112)
CO2 SERPL-SCNC: 25 MMOL/L (ref 21–32)
CREAT BLD-MCNC: 1.34 MG/DL
EGFRCR SERPLBLD CKD-EPI 2021: 54 ML/MIN/1.73M2 (ref 60–?)
EOSINOPHIL # BLD AUTO: 0.31 X10(3) UL (ref 0–0.7)
EOSINOPHIL NFR BLD AUTO: 6.4 %
ERYTHROCYTE [DISTWIDTH] IN BLOOD BY AUTOMATED COUNT: 13.2 %
FASTING STATUS PATIENT QL REPORTED: YES
GLOBULIN PLAS-MCNC: 4.2 G/DL (ref 2.8–4.4)
GLUCOSE BLD-MCNC: 102 MG/DL (ref 70–99)
HCT VFR BLD AUTO: 31.9 %
HGB BLD-MCNC: 10.4 G/DL
IMM GRANULOCYTES # BLD AUTO: 0.02 X10(3) UL (ref 0–1)
IMM GRANULOCYTES NFR BLD: 0.4 %
INR BLD: 1.12 (ref 0.85–1.16)
LYMPHOCYTES # BLD AUTO: 0.6 X10(3) UL (ref 1–4)
LYMPHOCYTES NFR BLD AUTO: 12.4 %
MCH RBC QN AUTO: 32.5 PG (ref 26–34)
MCHC RBC AUTO-ENTMCNC: 32.6 G/DL (ref 31–37)
MCV RBC AUTO: 99.7 FL
MONOCYTES # BLD AUTO: 0.56 X10(3) UL (ref 0.1–1)
MONOCYTES NFR BLD AUTO: 11.6 %
NEUTROPHILS # BLD AUTO: 3.31 X10 (3) UL (ref 1.5–7.7)
NEUTROPHILS # BLD AUTO: 3.31 X10(3) UL (ref 1.5–7.7)
NEUTROPHILS NFR BLD AUTO: 68.6 %
OSMOLALITY SERPL CALC.SUM OF ELEC: 298 MOSM/KG (ref 275–295)
PLATELET # BLD AUTO: 231 10(3)UL (ref 150–450)
POTASSIUM SERPL-SCNC: 5.1 MMOL/L (ref 3.5–5.1)
PROT SERPL-MCNC: 7.4 G/DL (ref 6.4–8.2)
PROTHROMBIN TIME: 14.4 SECONDS (ref 11.6–14.8)
RBC # BLD AUTO: 3.2 X10(6)UL
RH BLOOD TYPE: POSITIVE
SODIUM SERPL-SCNC: 140 MMOL/L (ref 136–145)
WBC # BLD AUTO: 4.8 X10(3) UL (ref 4–11)

## 2023-10-06 PROCEDURE — 87081 CULTURE SCREEN ONLY: CPT

## 2023-10-06 PROCEDURE — 85730 THROMBOPLASTIN TIME PARTIAL: CPT

## 2023-10-06 PROCEDURE — 86900 BLOOD TYPING SEROLOGIC ABO: CPT

## 2023-10-06 PROCEDURE — 36415 COLL VENOUS BLD VENIPUNCTURE: CPT

## 2023-10-06 PROCEDURE — 71046 X-RAY EXAM CHEST 2 VIEWS: CPT | Performed by: FAMILY MEDICINE

## 2023-10-06 PROCEDURE — 85610 PROTHROMBIN TIME: CPT

## 2023-10-06 PROCEDURE — 86901 BLOOD TYPING SEROLOGIC RH(D): CPT

## 2023-10-06 PROCEDURE — 85025 COMPLETE CBC W/AUTO DIFF WBC: CPT

## 2023-10-06 PROCEDURE — 80053 COMPREHEN METABOLIC PANEL: CPT

## 2023-10-06 PROCEDURE — 86850 RBC ANTIBODY SCREEN: CPT

## 2023-10-06 NOTE — TELEPHONE ENCOUNTER
PT Director, Geraldyne Severs called to address request for letter to be written to be sent to [facility] for appeal.     Additional information may be found in \"Schedule Surgery\" TE initiated on 9/12/23.

## 2023-10-06 NOTE — TELEPHONE ENCOUNTER
Received printed letter from PT department for appeal. Contact information for [employer] found below. Note sent to Dr. Ferny Szymanski at [facility].      #[phone]

## 2023-10-06 NOTE — TELEPHONE ENCOUNTER
Received call from pt Life Partner Martin. Martin states:    - that the insurance request to have a PT note in by Monday is impossible. Pt life partner expresses concern that the situation is overwhelming. Tisha Mcdermott to contact the pt old PT location to inquire of recommended next steps for pt. Martin acknowledged. She inquired what will happen if she cannot get the letter in the required time frame of the insurance. Informed that an official appeal will be filed for further review. Martin acknowledged. - requests that all phone calls are directed to her and not the daughter as Lin Myers is a  and does not want the daughter to be interrupted during work hours. Nursing acknowledged. Martin states she will attempt to obtain the letter that the insurance is requesting, however it seems unlikely it will be accomplished in the time frame they are requiring. She will inform the office of any updates. Nursing acknowledged. Call was ended.

## 2023-10-06 NOTE — TELEPHONE ENCOUNTER
[facility] PT Director, Kristie Manriquez called to address request for letter to be written to be sent to [facility] for appeal.     Additional information may be found in Prior Auth TE initiated on 10/5/23. Per Janette Evans:  -patient came into PT Department requesting a letter for insurance company.   -it is not within bounds of PT Department to write a letter that states patient cannot presently tolerate PT as he has not been seen for PT sessions since 2021.    -They can write a letter stating that \"patient underwent PT on the specific 2021 treatment dates, he met some goals, but is not able to achieve goals without experiencing pain. \"    Nursing thanked  Janette Evans for his assistance, [facility] fax number provided, and the call was ended.

## 2023-10-06 NOTE — TELEPHONE ENCOUNTER
Received PT note from [facility]. Contact information for [facility] found in Schedule Surgery TE. Note sent to Dr. Chico Tilley at [facility].

## 2023-10-10 ENCOUNTER — OFFICE VISIT (OUTPATIENT)
Dept: FAMILY MEDICINE CLINIC | Facility: CLINIC | Age: 78
End: 2023-10-10
Payer: MEDICARE

## 2023-10-10 VITALS
DIASTOLIC BLOOD PRESSURE: 58 MMHG | HEART RATE: 69 BPM | OXYGEN SATURATION: 95 % | WEIGHT: 167 LBS | HEIGHT: 72 IN | BODY MASS INDEX: 22.62 KG/M2 | SYSTOLIC BLOOD PRESSURE: 88 MMHG | RESPIRATION RATE: 15 BRPM

## 2023-10-10 DIAGNOSIS — I10 BENIGN ESSENTIAL HTN: ICD-10-CM

## 2023-10-10 DIAGNOSIS — S32.000A LUMBAR COMPRESSION FRACTURE, CLOSED, INITIAL ENCOUNTER (HCC): ICD-10-CM

## 2023-10-10 DIAGNOSIS — D64.9 ANEMIA, UNSPECIFIED TYPE: ICD-10-CM

## 2023-10-10 DIAGNOSIS — I95.2 HYPOTENSION DUE TO DRUGS: ICD-10-CM

## 2023-10-10 DIAGNOSIS — Z01.818 PREOP EXAMINATION: Primary | ICD-10-CM

## 2023-10-10 DIAGNOSIS — J43.9 PULMONARY EMPHYSEMA, UNSPECIFIED EMPHYSEMA TYPE (HCC): ICD-10-CM

## 2023-10-10 DIAGNOSIS — R01.1 HEART MURMUR, SYSTOLIC: ICD-10-CM

## 2023-10-10 DIAGNOSIS — M54.31 BILATERAL SCIATICA: ICD-10-CM

## 2023-10-10 DIAGNOSIS — F41.9 ANXIETY: ICD-10-CM

## 2023-10-10 DIAGNOSIS — M54.32 BILATERAL SCIATICA: ICD-10-CM

## 2023-10-10 PROCEDURE — 3074F SYST BP LT 130 MM HG: CPT | Performed by: FAMILY MEDICINE

## 2023-10-10 PROCEDURE — 3078F DIAST BP <80 MM HG: CPT | Performed by: FAMILY MEDICINE

## 2023-10-10 PROCEDURE — 99215 OFFICE O/P EST HI 40 MIN: CPT | Performed by: FAMILY MEDICINE

## 2023-10-10 PROCEDURE — 1160F RVW MEDS BY RX/DR IN RCRD: CPT | Performed by: FAMILY MEDICINE

## 2023-10-10 PROCEDURE — 1111F DSCHRG MED/CURRENT MED MERGE: CPT | Performed by: FAMILY MEDICINE

## 2023-10-10 PROCEDURE — 3008F BODY MASS INDEX DOCD: CPT | Performed by: FAMILY MEDICINE

## 2023-10-10 PROCEDURE — 1159F MED LIST DOCD IN RCRD: CPT | Performed by: FAMILY MEDICINE

## 2023-10-10 RX ORDER — FERROUS SULFATE 325(65) MG
325 TABLET ORAL
COMMUNITY
Start: 2023-10-10

## 2023-10-11 ENCOUNTER — LABORATORY ENCOUNTER (OUTPATIENT)
Dept: LAB | Facility: HOSPITAL | Age: 78
End: 2023-10-11
Attending: NEUROLOGICAL SURGERY
Payer: MEDICARE

## 2023-10-11 DIAGNOSIS — S32.000A LUMBAR COMPRESSION FRACTURE, CLOSED, INITIAL ENCOUNTER (HCC): ICD-10-CM

## 2023-10-11 LAB
ANTIBODY SCREEN: NEGATIVE
RH BLOOD TYPE: POSITIVE

## 2023-10-11 PROCEDURE — 86900 BLOOD TYPING SEROLOGIC ABO: CPT

## 2023-10-11 PROCEDURE — 86850 RBC ANTIBODY SCREEN: CPT

## 2023-10-11 PROCEDURE — 86901 BLOOD TYPING SEROLOGIC RH(D): CPT

## 2023-10-12 NOTE — PROGRESS NOTES
HPI:     Alanna Metcalf is a [de-identified]year old male with a PMH of HTN, HL, GERD OA. Following for:  1. BPH/LUTS + h/o bladder stones  - s/p urolift and cystolithopaxy ([facility] Car[facility]aj[facility]) 3/22/22  - carlos/pro since ~ 2014, stopped ~ 2022, back on carlos/pro 9/25/23  2. chronic right UPJ obstruction  - noted on CT in 2012 and stable  3. Recurrent MDR UTIs  - UCx 9/11/23, 11/21/22 MDR E coli S to gent, macrobid, zosyn  3. PVD  - Kegels reviewed  4. ED  - 100 mg viagra prn  5. Fam h/o CaP  - dad in 62s tx with XRT    PCP - Ch[facility]  Prior Urologist - Dolores Carvajal 4/6/22  LOV 9/25/2023    Presents for symptom check, cysto. Had urolift in March 2022, stopped carlos/pro and developed 2 septic UTIs afterwards requiring hospitalization. No prior UTIs. Admitted from 9/12/23 - 9/16/23 with UTI, bacteremia. He had severe right flank pain while admitted but has two fractured vertebrae which may have been contributing. Had another UTI in Nov 2022. He has resumed carlos/pro and driking a lot more water. Urinary symptoms are better but bothered by nocturia. Also with lightheadedness. Takes ibuprofen and alleve TID for back pain and just started riding bike. 2  No UTIs since LOV. He has back surgery scheduled in November provided he doesn't get another UTI. AUA SS is 6/35 with 3 n, FERNANDO; 1 w, u, f. Was 17/35 prior to carlos/pro with 4 n; 3 w, I, FERNANDO; 2 u; 1 f. Mostly unhappy with LUTS. Incontinence: PVD which developed after urolift and goes through 1 PPD and damp. He is doing Kegels  Pensocrotal LOV: no abnormalities  BILLY LOV: ~ 40 g prostate, no nodules or tenderness    UA is negative and PVR was zero    Hip/back pain: yes - b/l  Diarrhea/constipation: none  UTI hx: 2 since urolift.  Nothing prior  Gross hematuria: 5-6 y ago  Tobacco hx: 30 pack years, quit 2006  Kidney stone hx: none    < 5 % potency without meds and <30 % potency with 100 mg viagra prn and may want coupon later    PSA 2.33 7/12/23    CT SP 9/12/23: chronic, low lying right kidney with probable chronic mild-mod right UPJO, stable from CT in Nov 2022 and stable from CT report 1/9/12    Reported no plain water, 40-60 dilute tea oz water with clear urine. Now 40-60 water, no dilute tea with clear urine. I encouraged the pt drink at least 40-60 oz water per day or enough to keep urine clear to pale yellow for UTI prevention. Cysto today: moderate BPH with very high riding neck in the mid-prostate up to the bladder which is likely iatrogenic/due to prior urolift. Mod BREANA. Patulous right ureter presumably indicating reflux. Have discussed the right hydronephrosis appears stable since at least 2012. He has a patulous right distal ureteral orifice so may have a mild component of reflux as well. For nocturia, I'd recommend the patient drink plenty of fluids first thing in the morning and avoid drinking anything at least 2-3 hours prior to bedtime. If the patient takes diuretic I would recommend they take them first thing in the morning. If the patient takes NSAIDs I would consider they switch to taking prior to bedtime. I'd also encourage regular physical activity (for at least 30 minutes at least three times per week) as this has been shown to help with nocturia. Finally we discussed that setting a regular bedtime can help regulate nocturnal levels of melatonin and ADH, which can help with sleep and reduce nocturia. He will continue significantly increased water intake and has cut back on tea for adequate hydration/UTI prevention. Start exercising and consider switching meloxicam to qhs for nocturia with fluid adjustments for nocturia. Continue flomax, proscar. Continue Kegels and recommend he eventually go through PFT for PVD which he will do after back surgery. May continue 100 mg viagra prn.  Consider TURP later after back surgery if urinary symptoms fail to improve with above strategies but should get incontinence under better control first and obstructive symptoms are under much better control with carlos/pro. F/u in 6 mo for check-up. PROCEDURE NOTE    PROCEDURE PERFORMED: Flexible Cystoscopy    After informed consent and urinalysis was obtained, he was placed in the supine position and prepped and draped in the usual sterile fashion using Betadine. Local anesthesia was induced by the introduction of 2% Lidocaine jelly per urethra. A 16 Estonian flexible scope was passed through the anterior urethra, the posterior urethra and prostate were negotiated and the bladder was entered. The entirety of the bladder was examined and the scope was retroflexed to examine the bladder neck. Findings: as noted above    The patient tolerated the procedure well, suffered no complications, was able to void spontaneously after completion of the procedure in the office, and left the office in good condition. Carri-procedural antibiotics were given.  _________________________________      Prior note:  As symptoms worsened since urolift I would recommend cysto at some point in the near future to determine if foreign bodies are noted within the lumen that could be contributing to issues. For nocturia, I'd recommend the patient drink plenty of fluids first thing in the morning and avoid drinking anything at least 2-3 hours prior to bedtime. If the patient takes diuretic I would recommend they take them first thing in the morning. If the patient takes NSAIDs I would consider they switch to taking prior to bedtime. I'd also encourage regular physical activity (for at least 30 minutes at least three times per week) as this has been shown to help with nocturia. Finally we discussed that setting a regular bedtime can help regulate nocturnal levels of melatonin and ADH, which can help with sleep and reduce nocturia.       HISTORY:  Past Medical History:   Diagnosis Date    Adverse drug reaction 12/10/2014    Age-related cataract of both eyes 08/04/2021    Arthritis 2010    Asthma     Back pain 2010 Back problem     Benign neoplasm of colon     BPH (benign prostatic hyperplasia) 2013    Calculus of kidney ? COPD (chronic obstructive pulmonary disease) (HCC)     Coronary atherosclerosis     Diverticulosis of colon (without mention of hemorrhage)     Easy bruising     Esophageal reflux     Flatulence/gas pain/belching occasional    Frequent urination 2010    Frequent use of laxatives occasional    Heartburn ? Hemorrhoids ? High blood pressure     High cholesterol     History of cardiac murmur ? Hx of heart artery stent     Prostate nodule 2016    Had biopsy 2016    Stented coronary artery ?    one    Unspecified hemorrhoids without mention of complication     Visual impairment     READING GLASSES    Wears glasses 2020      Past Surgical History:   Procedure Laterality Date    ANGIOPLASTY (CORONARY)  2011    APPENDECTOMY  1949    CATARACT Bilateral 2021    cataract removed    COLONOSCOPY  2012    + polyps    INSERT INTRACORONARY STENT      OTHER SURGICAL HISTORY  2022    Cystoscopy Dr. Sergio Herrera  2012    esophagitis, repeat in 5 year      Family History   Problem Relation Age of Onset    Breast Cancer Sister     Other (CABG) Father         x4    Other (esophogeal reflux) Mother     Other (hip replacement) Mother       Social History:   Social History     Socioeconomic History    Marital status: Life Partner   Tobacco Use    Smoking status: Former     Packs/day: 1.00     Years: 40.00     Additional pack years: 0.00     Total pack years: 40.00     Types: Cigarettes     Start date: 1968     Quit date: 2011     Years since quittin.4    Smokeless tobacco: Never   Vaping Use    Vaping Use: Never used   Substance and Sexual Activity    Alcohol use: No     Comment: Quit in . Prior had been a heavier drinker.     Drug use: Not Currently     Types: Cannabis   Other Topics Concern Caffeine Concern Yes    Exercise Yes     Comment: 6x/week   Social Determinants of Health  Financial Resource Strain: Low Risk  (9/18/2023)      Financial Resource Strain          Difficulty of Paying Living Expenses: Not very hard          Med Affordability: No  Transportation Needs: No Transportation Needs (9/18/2023)      Transportation Needs          Lack of Transportation: No       Medications (Active prior to today's visit):  Current Outpatient Medications   Medication Sig Dispense Refill    silodosin (RAPAFLO) 8 MG Oral Cap Take 1 capsule (8 mg total) by mouth every evening. 90 capsule 5    Ferrous Sulfate 325 (65 Fe) MG Oral Tab Take 1 tablet (325 mg total) by mouth daily with breakfast.      finasteride 5 MG Oral Tab Take 1 tablet (5 mg total) by mouth daily. 90 tablet 6    Ibuprofen-Acetaminophen (ADVIL DUAL ACTION OR) Take by mouth.      calcitonin, salmon, 200 UNIT/ACT Nasal Solution 1 spray by Nasal route daily. 1 each 2    sertraline 50 MG Oral Tab Take 1 tablet (50 mg total) by mouth daily. 90 tablet 1    salmeterol 50 MCG/ACT Inhalation Aerosol Powder, Breath Activated Inhale 1 puff into the lungs 2 (two) times daily. 3 each 1    Meloxicam 15 MG Oral Tab Take 1 tablet (15 mg total) by mouth daily. 90 tablet 3    metoprolol succinate ER 25 MG Oral Tablet 24 Hr Take 1 tablet (25 mg total) by mouth daily. 90 tablet 3    Sildenafil Citrate 100 MG Oral Tab Take 1 tablet (100 mg total) by mouth as needed for Erectile Dysfunction. 4 tablet 11    simvastatin 20 MG Oral Tab TAKE 1 TABLET BY MOUTH EVERY NIGHT AT BEDTIME 90 tablet 3    losartan 50 MG Oral Tab Take 1 tablet (50 mg total) by mouth daily. 90 tablet 3    Omeprazole Magnesium 20 MG Oral Tab EC Take 1 tablet (20 mg total) by mouth daily. 90 tablet 3    Aspirin 81 MG Oral Tab Take 1 tablet (81 mg total) by mouth daily. Allergies:  No Known Allergies      ROS:     A comprehensive 10 point review of systems was completed.   Pertinent positives and negatives noted in the the HPI. PHYSICAL EXAM:     GENERAL APPEARANCE: well, developed, well nourished, in no acute distress  NEUROLOGIC: nonfocal, alert and oriented  HEAD: normocephalic, atraumatic  EYES: sclera non-icteric  EARS: hearing intact  ORAL CAVITY: mucosa moist  NECK/THYROID: no obvious goiter or masses  LUNGS: nonlabored breathing  ABDOMEN: soft, no obvious masses or tenderness  SKIN: no obvious rashes    : as noted above    ASSESSMENT/PLAN:   Diagnoses and all orders for this visit:    BPH with obstruction/lower urinary tract symptoms  -     silodosin (RAPAFLO) 8 MG Oral Cap; Take 1 capsule (8 mg total) by mouth every evening. Recurrent UTI  -     sulfamethoxazole-trimethoprim DS (Bactrim DS) 800-160 MG per tab 1 tablet  -     POC Urinalysis, Automated Dip without microscopy (PCA and EMMG ONLY) [72779]  -     CYSTOURETHROSCOPY    Post-void dribbling  -     PELVIC FLOOR THERAPY - INTERNAL    Hydronephrosis of right kidney    Nocturia    - as noted above. Thanks again for this consult.     Yamilet Romero MD, [address]  Urologist  [address]  Office: [phone]

## 2023-10-18 ENCOUNTER — TELEPHONE (OUTPATIENT)
Dept: PHYSICAL THERAPY | Facility: HOSPITAL | Age: 78
End: 2023-10-18

## 2023-10-18 ENCOUNTER — HOSPITAL ENCOUNTER (OUTPATIENT)
Dept: GENERAL RADIOLOGY | Age: 78
Discharge: HOME | End: 2023-10-18
Attending: NEUROLOGICAL SURGERY
Payer: MEDICARE

## 2023-10-18 DIAGNOSIS — S32.010A COMPRESSION FRACTURE OF L1 VERTEBRA, INITIAL ENCOUNTER (HCC): ICD-10-CM

## 2023-10-18 DIAGNOSIS — S32.050A COMPRESSION FRACTURE OF L5 LUMBAR VERTEBRA, CLOSED, INITIAL ENCOUNTER (HCC): ICD-10-CM

## 2023-10-18 PROCEDURE — 72114 X-RAY EXAM L-S SPINE BENDING: CPT | Performed by: NEUROLOGICAL SURGERY

## 2023-10-19 ENCOUNTER — OFFICE VISIT (OUTPATIENT)
Dept: SURGERY | Facility: CLINIC | Age: 78
End: 2023-10-19
Payer: MEDICARE

## 2023-10-19 VITALS
WEIGHT: 167 LBS | DIASTOLIC BLOOD PRESSURE: 72 MMHG | BODY MASS INDEX: 23 KG/M2 | SYSTOLIC BLOOD PRESSURE: 118 MMHG | HEART RATE: 58 BPM

## 2023-10-19 DIAGNOSIS — S32.010A COMPRESSION FRACTURE OF L1 VERTEBRA, INITIAL ENCOUNTER (HCC): Primary | ICD-10-CM

## 2023-10-19 PROCEDURE — 3008F BODY MASS INDEX DOCD: CPT | Performed by: NEUROLOGICAL SURGERY

## 2023-10-19 PROCEDURE — 1160F RVW MEDS BY RX/DR IN RCRD: CPT | Performed by: NEUROLOGICAL SURGERY

## 2023-10-19 PROCEDURE — 3074F SYST BP LT 130 MM HG: CPT | Performed by: NEUROLOGICAL SURGERY

## 2023-10-19 PROCEDURE — 99213 OFFICE O/P EST LOW 20 MIN: CPT | Performed by: NEUROLOGICAL SURGERY

## 2023-10-19 PROCEDURE — 1159F MED LIST DOCD IN RCRD: CPT | Performed by: NEUROLOGICAL SURGERY

## 2023-10-19 PROCEDURE — 3078F DIAST BP <80 MM HG: CPT | Performed by: NEUROLOGICAL SURGERY

## 2023-10-19 NOTE — PROGRESS NOTES
Established patient:  Reason for follow up:  1 month FX     Numeric Rating Scale: (No Pain) 0  to  10 (Worst Pain)        Pain at Present:  6/10       Distribution of Pain:    bilateral, feel a little bump on his spine, states he can't wear a brace     Most recent treatments for Current Pain Condition:   NSAIDS  Response to treatment: some relief    New imaging or testing since your last office visit:      XR lumbar 10.18.23

## 2023-10-20 ENCOUNTER — OFFICE VISIT (OUTPATIENT)
Facility: LOCATION | Age: 78
End: 2023-10-20
Attending: NEUROLOGICAL SURGERY
Payer: MEDICARE

## 2023-10-20 DIAGNOSIS — S32.050A COMPRESSION FRACTURE OF L5 LUMBAR VERTEBRA, CLOSED, INITIAL ENCOUNTER (HCC): ICD-10-CM

## 2023-10-20 DIAGNOSIS — S32.010A COMPRESSION FRACTURE OF L1 VERTEBRA, INITIAL ENCOUNTER (HCC): Primary | ICD-10-CM

## 2023-10-20 PROCEDURE — 97161 PT EVAL LOW COMPLEX 20 MIN: CPT

## 2023-10-20 PROCEDURE — 97110 THERAPEUTIC EXERCISES: CPT

## 2023-10-23 ENCOUNTER — PROCEDURE (OUTPATIENT)
Dept: SURGERY | Facility: CLINIC | Age: 78
End: 2023-10-23
Payer: MEDICARE

## 2023-10-23 ENCOUNTER — OFFICE VISIT (OUTPATIENT)
Facility: LOCATION | Age: 78
End: 2023-10-23
Attending: NEUROLOGICAL SURGERY
Payer: MEDICARE

## 2023-10-23 DIAGNOSIS — N13.30 HYDRONEPHROSIS OF RIGHT KIDNEY: ICD-10-CM

## 2023-10-23 DIAGNOSIS — N39.0 RECURRENT UTI: ICD-10-CM

## 2023-10-23 DIAGNOSIS — N13.8 BPH WITH OBSTRUCTION/LOWER URINARY TRACT SYMPTOMS: Primary | ICD-10-CM

## 2023-10-23 DIAGNOSIS — R35.1 NOCTURIA: ICD-10-CM

## 2023-10-23 DIAGNOSIS — N40.1 BPH WITH OBSTRUCTION/LOWER URINARY TRACT SYMPTOMS: Primary | ICD-10-CM

## 2023-10-23 DIAGNOSIS — N39.43 POST-VOID DRIBBLING: ICD-10-CM

## 2023-10-23 LAB
APPEARANCE: CLEAR
BILIRUBIN: NEGATIVE
GLUCOSE (URINE DIPSTICK): NEGATIVE MG/DL
KETONES (URINE DIPSTICK): NEGATIVE MG/DL
LEUKOCYTES: NEGATIVE
MULTISTIX LOT#: NORMAL NUMERIC
NITRITE, URINE: NEGATIVE
OCCULT BLOOD: NEGATIVE
PH, URINE: 5.5 (ref 4.5–8)
PROTEIN (URINE DIPSTICK): NEGATIVE MG/DL
SPECIFIC GRAVITY: 1.02 (ref 1–1.03)
URINE-COLOR: YELLOW
UROBILINOGEN,SEMI-QN: 0.2 MG/DL (ref 0–1.9)

## 2023-10-23 PROCEDURE — 99214 OFFICE O/P EST MOD 30 MIN: CPT | Performed by: UROLOGY

## 2023-10-23 PROCEDURE — 1159F MED LIST DOCD IN RCRD: CPT | Performed by: UROLOGY

## 2023-10-23 PROCEDURE — 1160F RVW MEDS BY RX/DR IN RCRD: CPT | Performed by: UROLOGY

## 2023-10-23 PROCEDURE — 81003 URINALYSIS AUTO W/O SCOPE: CPT | Performed by: UROLOGY

## 2023-10-23 PROCEDURE — 97140 MANUAL THERAPY 1/> REGIONS: CPT

## 2023-10-23 PROCEDURE — 97110 THERAPEUTIC EXERCISES: CPT

## 2023-10-23 PROCEDURE — 52000 CYSTOURETHROSCOPY: CPT | Performed by: UROLOGY

## 2023-10-23 RX ORDER — SILODOSIN 8 MG/1
8 CAPSULE ORAL EVERY EVENING
Qty: 90 CAPSULE | Refills: 5 | Status: SHIPPED | OUTPATIENT
Start: 2023-10-23

## 2023-10-23 RX ORDER — SULFAMETHOXAZOLE AND TRIMETHOPRIM 800; 160 MG/1; MG/1
1 TABLET ORAL ONCE
Status: COMPLETED | OUTPATIENT
Start: 2023-10-23 | End: 2023-10-23

## 2023-10-23 RX ADMIN — SULFAMETHOXAZOLE AND TRIMETHOPRIM 1 TABLET: 800; 160 TABLET ORAL at 10:26:00

## 2023-10-23 NOTE — PROGRESS NOTES
Diagnosis:    Compression fracture of L1 vertebra, initial encounter ([facility]) (S32.010A)  Compression fracture of L5 lumbar vertebra, closed, initial encounter ([facility] [address] (S32.050A)       Referring Provider: Jeet Mathis  Date of Evaluation:    10/20/23    Precautions:  no twisting, bending or heavy lifting Next MD visit:   none scheduled  Date of Surgery: n/a   Insurance Primary/Secondary: Haven Anusha / N/A     # Auth Visits: 8            Subjective: frustrated about insurance issues; had pain with knee to chest; did not have any pain in the legs last night which is a change     Pain: 3/10      Objective:   See flowsheet      Assessment:   Increased irritability with gentle exercises  Minimal to moderate cueing needed  Some guarding in the hip flexors        Goals:   Pt will improve transversus abdominis recruitment to perform proper isometric contraction without requiring verbal or tactile cuing to promote advancement of therex   Pt will demonstrate good understanding of proper posture and body mechanics to decrease pain and improve spinal safety   Pt will improve lumbar spine AROM flexion to 70% motion to allow increase ease with bending forward to don shoes   Pt will achieve at least 4/5 (B) hip strength in order to maximize effectiveness and efficiency with functional activities such as walking, steps, transitions, squatting down to  things  Pt will report being able to walk for 3/4 to 1 block with less reported pain  Pt will be able to report getting out of bed with less pain and less interruptions in sleep  Pt will be able to independently don/doff pants, socks, shoes with less restriction and pain  Pt will be independent and compliant with comprehensive HEP to maintain progress achieved in PT      Plan: decrease low back pain via lumbar/hip mobility exercises, flexibility, core/hip strengthening  Date: 10/23/2023  TX#: 2/8 Date:                 TX#: 3/ Date:                 TX#: 4/ Date:                 TX#: 5/ Date:   Tx#: 6/   Manual Therapy       (B) hip flexor release       TherEx       NuStep x 6 min (L5)       H/L PPT x 15 (5 second hold)       H/L HS stretch x 5 (15 second hold) each       DKTC w/GPB x 2 min       DKTC x 5 (15 second hold)       SKTC x 5 (10 second hold) each       H/L piriformis stretch 30 sec x 3       Seated lumbar flexion w/GPB x 10 (10 second hold)       HEP:   Access Code: [email or web address]    Exercises  - Supine Posterior Pelvic Tilt  - 1-2 x daily - 7 x weekly - 3 sets - 10 reps  - Supine Piriformis Stretch with Foot on Ground  - 1-2 x daily - 7 x weekly - 3 sets - 30 seconds hold  - Supine Hamstring Stretch  - 1-2 x daily - 7 x weekly - 3 sets - 30 seconds hold  - Supine Double Knee to Chest  - 1-2 x daily - 7 x weekly - 10 reps - 10 seconds hold    Charges:  TherEx 2; Manual 1       Total Timed Treatment: 40 min  Total Treatment Time: 40 min

## 2023-10-25 ENCOUNTER — APPOINTMENT (OUTPATIENT)
Facility: LOCATION | Age: 78
End: 2023-10-25
Attending: NEUROLOGICAL SURGERY
Payer: MEDICARE

## 2023-10-26 ENCOUNTER — APPOINTMENT (OUTPATIENT)
Facility: LOCATION | Age: 78
End: 2023-10-26
Attending: NEUROLOGICAL SURGERY
Payer: MEDICARE

## 2023-10-30 ENCOUNTER — OFFICE VISIT (OUTPATIENT)
Facility: LOCATION | Age: 78
End: 2023-10-30
Attending: NEUROLOGICAL SURGERY
Payer: MEDICARE

## 2023-10-30 PROCEDURE — 97110 THERAPEUTIC EXERCISES: CPT

## 2023-10-30 NOTE — PROGRESS NOTES
Diagnosis:    Compression fracture of L1 vertebra, initial encounter ([facility]) (S32.010A)  Compression fracture of L5 lumbar vertebra, closed, initial encounter ([facility] [address] (S32.050A)       Referring Provider: Missy Alvarado  Date of Evaluation:    10/20/23    Precautions:  no twisting, bending or heavy lifting Next MD visit:   none scheduled  Date of Surgery: n/a   Insurance Primary/Secondary: [facility] / N/A     # Auth Visits: 8            Subjective: having pain after exercises; did ride the stationary bike and did ok with that for awhile     Pain: 4/10      Objective:   See flowsheet      Assessment:   Increased irritability with gentle exercises  Discussed proper supine to sit transition to minimize pain   Did not feel much relief with exercises implemented today  As patient obtains a more upright, neutral spine position he has increased discomfort           Goals:   Pt will improve transversus abdominis recruitment to perform proper isometric contraction without requiring verbal or tactile cuing to promote advancement of therex   Pt will demonstrate good understanding of proper posture and body mechanics to decrease pain and improve spinal safety   Pt will improve lumbar spine AROM flexion to 70% motion to allow increase ease with bending forward to don shoes   Pt will achieve at least 4/5 (B) hip strength in order to maximize effectiveness and efficiency with functional activities such as walking, steps, transitions, squatting down to  things  Pt will report being able to walk for 3/4 to 1 block with less reported pain  Pt will be able to report getting out of bed with less pain and less interruptions in sleep  Pt will be able to independently don/doff pants, socks, shoes with less restriction and pain  Pt will be independent and compliant with comprehensive HEP to maintain progress achieved in PT      Plan: decrease low back pain via lumbar/hip mobility exercises, flexibility, core/hip strengthening  Date: 10/23/2023  TX#: 2/8 Date:  10/30/23             TX#: 3/8 Date:                 TX#: 4/ Date:                 TX#: 5/ Date: Tx#: 6/   Manual Therapy Manual Therapy      (B) hip flexor release       TherEx TherEx      NuStep x 6 min (L5) NuStep x 6 min (L5)      H/L PPT x 15 (5 second hold) H/L PPT x 15 (5 second hold)      H/L HS stretch x 5 (15 second hold) each H/L HS stretch x 5 (15 second hold) each      DKTC w/GPB x 2 min DKTC w/GPB x 2 min      DKTC x 5 (15 second hold) H/L piriformis stretch 30 sec x 3      SKTC x 5 (10 second hold) each H/L TrA march x 20      H/L piriformis stretch 30 sec x 3 Seated lumbar flexion w/GPB x 10 (10 seconds)      Seated lumbar flexion w/GPB x 10 (10 second hold) H/L TrA ball press x 15 (5 second hold)       H/L clam GTB x 20      HEP:   Access Code: [email or web address]    Exercises  - Supine Posterior Pelvic Tilt  - 1-2 x daily - 7 x weekly - 3 sets - 10 reps  - Supine Piriformis Stretch with Foot on Ground  - 1-2 x daily - 7 x weekly - 3 sets - 30 seconds hold  - Supine Hamstring Stretch  - 1-2 x daily - 7 x weekly - 3 sets - 30 seconds hold  - Supine Double Knee to Chest  - 1-2 x daily - 7 x weekly - 10 reps - 10 seconds hold    Charges:  TherEx 2;      Total Timed Treatment: 30 min  Total Treatment Time: 30 min

## 2023-11-01 ENCOUNTER — HOSPITAL ENCOUNTER (OUTPATIENT)
Dept: CV DIAGNOSTICS | Age: 78
Discharge: HOME | End: 2023-11-01
Attending: INTERNAL MEDICINE
Payer: MEDICARE

## 2023-11-01 DIAGNOSIS — I77.9 CAROTID ARTERY DISEASE (HCC): ICD-10-CM

## 2023-11-01 PROCEDURE — 93306 TTE W/DOPPLER COMPLETE: CPT | Performed by: INTERNAL MEDICINE

## 2023-11-02 ENCOUNTER — APPOINTMENT (OUTPATIENT)
Facility: LOCATION | Age: 78
End: 2023-11-02
Attending: NEUROLOGICAL SURGERY
Payer: MEDICARE

## 2023-11-07 ENCOUNTER — APPOINTMENT (OUTPATIENT)
Facility: LOCATION | Age: 78
End: 2023-11-07
Attending: NEUROLOGICAL SURGERY
Payer: MEDICARE

## 2023-11-07 ENCOUNTER — OFFICE VISIT (OUTPATIENT)
Facility: LOCATION | Age: 78
End: 2023-11-07
Attending: NEUROLOGICAL SURGERY
Payer: MEDICARE

## 2023-11-07 PROCEDURE — 97110 THERAPEUTIC EXERCISES: CPT

## 2023-11-07 NOTE — PROGRESS NOTES
Diagnosis:    Compression fracture of L1 vertebra, initial encounter ([facility]) (S32.010A)  Compression fracture of L5 lumbar vertebra, closed, initial encounter ([facility] [address] (S32.050A)       Referring Provider: Ralph Coffman  Date of Evaluation:    10/20/23    Precautions:  no twisting, bending or heavy lifting Next MD visit:   none scheduled  Date of Surgery: n/a   Insurance Primary/Secondary: [employer]ha Peres / N/A     # Auth Visits: 8            Subjective: still having alot of pain; doing 15 minutes of upright bike at home    Pain: 4/10      Objective:   See flowsheet      Assessment:   Increased irritability with gentle exercises  Pelvic tilts seem to help along with forward bends  Discussed gentle exercises for pain relief           Goals:   Pt will improve transversus abdominis recruitment to perform proper isometric contraction without requiring verbal or tactile cuing to promote advancement of therex   Pt will demonstrate good understanding of proper posture and body mechanics to decrease pain and improve spinal safety   Pt will improve lumbar spine AROM flexion to 70% motion to allow increase ease with bending forward to don shoes   Pt will achieve at least 4/5 (B) hip strength in order to maximize effectiveness and efficiency with functional activities such as walking, steps, transitions, squatting down to  things  Pt will report being able to walk for 3/4 to 1 block with less reported pain  Pt will be able to report getting out of bed with less pain and less interruptions in sleep  Pt will be able to independently don/doff pants, socks, shoes with less restriction and pain  Pt will be independent and compliant with comprehensive HEP to maintain progress achieved in PT      Plan: decrease low back pain via lumbar/hip mobility exercises, flexibility, core/hip strengthening  Date: 10/23/2023  TX#: 2/8 Date:  10/30/23             TX#: 3/8 Date: 11/7/23                TX#: 4/8 Date:                 TX#: 5/ Date:    Tx#: 6/ Manual Therapy Manual Therapy Manual Therapy     (B) hip flexor release       TherEx TherEx TherEx     NuStep x 6 min (L5) NuStep x 6 min (L5) NuStep x 5 min (L5)     H/L PPT x 15 (5 second hold) H/L PPT x 15 (5 second hold) H/L PPT x 15 (5 second hold)     H/L HS stretch x 5 (15 second hold) each H/L HS stretch x 5 (15 second hold) each H/L HS stretch x 5 (15 second hold) each     DKTC w/GPB x 2 min DKTC w/GPB x 2 min DKTC w/GPB x 2 min     DKTC x 5 (15 second hold) H/L piriformis stretch 30 sec x 3 H/L piriformis stretch 30 sec x 3     SKTC x 5 (10 second hold) each H/L TrA march x 20 H/L TrA march x 20     H/L piriformis stretch 30 sec x 3 Seated lumbar flexion w/GPB x 10 (10 seconds) H/L TrA ball press x 15 (5 second hold)     Seated lumbar flexion w/GPB x 10 (10 second hold) H/L TrA ball press x 15 (5 second hold) H/L clam GTB x 20      H/L clam GTB x 20 Seated lumbar flexion w/GPB x 10 (10 seconds)     HEP:   Access Code: [email or web address]    Exercises  - Supine Posterior Pelvic Tilt  - 1-2 x daily - 7 x weekly - 3 sets - 10 reps  - Supine Piriformis Stretch with Foot on Ground  - 1-2 x daily - 7 x weekly - 3 sets - 30 seconds hold  - Supine Hamstring Stretch  - 1-2 x daily - 7 x weekly - 3 sets - 30 seconds hold  - Supine Double Knee to Chest  - 1-2 x daily - 7 x weekly - 10 reps - 10 seconds hold    Charges:  TherEx 3;      Total Timed Treatment: 40 min  Total Treatment Time: 40 min

## 2023-11-15 ENCOUNTER — OFFICE VISIT (OUTPATIENT)
Facility: LOCATION | Age: 78
End: 2023-11-15
Attending: NEUROLOGICAL SURGERY
Payer: MEDICARE

## 2023-11-15 PROCEDURE — 97110 THERAPEUTIC EXERCISES: CPT

## 2023-11-15 NOTE — PROGRESS NOTES
Diagnosis:    Compression fracture of L1 vertebra, initial encounter ([facility]) (S32.010A)  Compression fracture of L5 lumbar vertebra, closed, initial encounter ([facility] [address] (S32.050A)       Referring Provider: Missy Alvarado  Date of Evaluation:    10/20/23    Precautions:  no twisting, bending or heavy lifting Next MD visit:   none scheduled  Date of Surgery: n/a   Insurance Primary/Secondary: Whit Becerra / N/A     # Auth Visits: 8            Subjective: can do about 10-15 minutes of upright bike at home but has pain at the end; pelvic tilts help a bit    Pain: 5/10 upon arrival to clinic       Objective:   See flowsheet      Assessment:   Difficult to progress exercises at this point due to varying pain levels with exercises and transitions. Needed to have a slight forward lean on bike for some relief  Cueing needed for exercise performance   Minimal pain with table exercises today.   Attempted sit to stand & side stepping to progress to weight bearing exercises however pain levels started to increase (6/10)       Goals:   Pt will improve transversus abdominis recruitment to perform proper isometric contraction without requiring verbal or tactile cuing to promote advancement of therex   Pt will demonstrate good understanding of proper posture and body mechanics to decrease pain and improve spinal safety   Pt will improve lumbar spine AROM flexion to 70% motion to allow increase ease with bending forward to don shoes   Pt will achieve at least 4/5 (B) hip strength in order to maximize effectiveness and efficiency with functional activities such as walking, steps, transitions, squatting down to  things  Pt will report being able to walk for 3/4 to 1 block with less reported pain  Pt will be able to report getting out of bed with less pain and less interruptions in sleep  Pt will be able to independently don/doff pants, socks, shoes with less restriction and pain  Pt will be independent and compliant with comprehensive HEP to maintain progress achieved in PT      Plan: decrease low back pain via lumbar/hip mobility exercises, flexibility, core/hip strengthening  Date: 10/23/2023  TX#: 2/8 Date:  10/30/23             TX#: 3/8 Date: 11/7/23                TX#: 4/8 Date:  11/15/27               TX#: 5/8 Date:    Tx#: 6/   Manual Therapy Manual Therapy Manual Therapy Manual Therapy    (B) hip flexor release       TherEx TherEx TherEx TherEx (40 min)    NuStep x 6 min (L5) NuStep x 6 min (L5) NuStep x 5 min (L5) Upright bike x 5 min (L5)    H/L PPT x 15 (5 second hold) H/L PPT x 15 (5 second hold) H/L PPT x 15 (5 second hold) H/L PPT x 20 (5 second hold)    H/L HS stretch x 5 (15 second hold) each H/L HS stretch x 5 (15 second hold) each H/L HS stretch x 5 (15 second hold) each H/L HS stretch 30 sec x 2    DKTC w/GPB x 2 min DKTC w/GPB x 2 min DKTC w/GPB x 2 min H/L piriformis stretch 20 sec x 4    DKTC x 5 (15 second hold) H/L piriformis stretch 30 sec x 3 H/L piriformis stretch 30 sec x 3 H/L TrA ball press x 15 (5 second hold)    SKTC x 5 (10 second hold) each H/L TrA march x 20 H/L TrA march x 20 H/L TrA march x 20 (10 each side)    H/L piriformis stretch 30 sec x 3 Seated lumbar flexion w/GPB x 10 (10 seconds) H/L TrA ball press x 15 (5 second hold) H/L clam RTB x 15    Seated lumbar flexion w/GPB x 10 (10 second hold) H/L TrA ball press x 15 (5 second hold) H/L clam GTB x 20 Seated lumbar flexion x 10 (10 seconds)     H/L clam GTB x 20 Seated lumbar flexion w/GPB x 10 (10 seconds) Sit to stand x 10       Side stepping x 3 laps   (Attempted 2 laps with RTB but increased discomfort)    HEP:   Access Code: [email or web address]    Exercises  - Supine Posterior Pelvic Tilt  - 1-2 x daily - 7 x weekly - 3 sets - 10 reps  - Supine Piriformis Stretch with Foot on Ground  - 1-2 x daily - 7 x weekly - 3 sets - 30 seconds hold  - Supine Hamstring Stretch  - 1-2 x daily - 7 x weekly - 3 sets - 30 seconds hold  - Supine Double Knee to Chest  - 1-2 x daily - 7 x weekly - 10 reps - 10 seconds hold    Charges:  TherEx 3;      Total Timed Treatment: 40 min  Total Treatment Time: 40 min

## 2023-11-22 ENCOUNTER — OFFICE VISIT (OUTPATIENT)
Facility: LOCATION | Age: 78
End: 2023-11-22
Attending: NEUROLOGICAL SURGERY
Payer: MEDICARE

## 2023-11-22 PROCEDURE — 97110 THERAPEUTIC EXERCISES: CPT

## 2023-11-22 NOTE — PROGRESS NOTES
Diagnosis:    Compression fracture of L1 vertebra, initial encounter ([facility]) (S32.010A)  Compression fracture of L5 lumbar vertebra, closed, initial encounter ([facility] [address] (S32.050A)       Referring Provider: Olamide Reese  Date of Evaluation:    10/20/23    Precautions:  no twisting, bending or heavy lifting Next MD visit:   none scheduled  Date of Surgery: n/a   Insurance Primary/Secondary: [employer] / N/A     # Auth Visits: 8         Discharge Note (DN)       Subjective: trying exercises at home but continues to have pain    Pain: 4/10 upon arrival to clinic       Objective:     Gait Summary:   (Evaluation): pt ambulates on level ground with antalgia, decreased step and stride length (pain with each step), trendelenburg, sway/imbalance   (DN): continues to walk with decreased step and stride length, slight antalgia     Thoracolumbar AROM: (* denotes performed with pain)  (Evaluation): Flexion 50% motion * (significant pain), Extension <25% motion*(significant pain), (R) SB 50% motion* (L) SB 50% motion*  (DN): Flexion 50% motion*; Extension <25% motion*; (R) SB 50% motion*; (L) SB 50% motion*      Flexibility:   (Evaluation):   LE (R) (L)   Hip flexor Mod Mod   Hamstring Max Max   Piriformis Mod Mod   Quad     Gastroc-soleus Mod Mod   (DN): Continues to demonstrate mod to max LE flexibility deficits (quad, hip flexor, HS); although slight improvement in HS flexibility upon assessment    Strength Summary:  (Evaluation):   MMT (Scale 0-5)  (R) (L)   Hip Flexion (L2) 4 5   Knee Extension (L3) 4 5   Ankle DF (L4) 5 5   Great Toe Extension (L5) 5 5   Ankle PF (S1) 4 4   Ankle Eversion (S1)     Hip Extension (S1) NT NT   Knee Flexion (S2) 4 4   Hip ER 4 4   Hip IR 4 4   Hip Abduction 3+ 3+   Hip Adduction     (DN): MMT (B) LE grossly 4/5 (exception (B) hip abduction 3+/4- out of 5)      Accessory motion:   (Evaluation):    Unable to obtain prone positioning for assessment at this time (due to pain)  (DN):   Deferred       Assessment: Pt has not shown any objective or subjective improvements in his symptoms and overall function. Difficult to progress to weight bearing/closed chain exercises due to pain. Continues to report moderate to significant pain depending on activity. A few of the exercises provide some temporary relief but no significant carryover. He will be scheduled in December for lumbar surgery. Encouraged to walk within tolerance and perform exercises that seem to have some benefit with pain modulation. Going forward, patient will continue HEP up until his surgery date.        Goals:   Pt will improve transversus abdominis recruitment to perform proper isometric contraction without requiring verbal or tactile cuing to promote advancement of therex - met  Pt will demonstrate good understanding of proper posture and body mechanics to decrease pain and improve spinal safety - met  Pt will improve lumbar spine AROM flexion to 70% motion to allow increase ease with bending forward to don shoes - not met  Pt will achieve at least 4/5 (B) hip strength in order to maximize effectiveness and efficiency with functional activities such as walking, steps, transitions, squatting down to  things - not met  Pt will report being able to walk for 3/4 to 1 block with less reported pain - not met  Pt will be able to report getting out of bed with less pain and less interruptions in sleep - not met  Pt will be able to independently don/doff pants, socks, shoes with less restriction and pain - not met  Pt will be independent and compliant with comprehensive HEP to maintain progress achieved in PT - met    Plan:     Post Oswestry Disability Index Score  Post Score: 54 % (11/22/2023 11:03 AM)    10 % improvement    Continue to participate in HEP prescribed up until date of surgery    Date: 10/23/2023  TX#: 2/8 Date:  10/30/23             TX#: 3/8 Date: 11/7/23                TX#: 4/8 Date:  11/15/23              TX#: 5/8 Date: 11/22/23  Tx#: 6/8 Manual Therapy Manual Therapy Manual Therapy Manual Therapy Manual Therapy   (B) hip flexor release       TherEx TherEx TherEx TherEx (40 min) TherEx (30 min)   NuStep x 6 min (L5) NuStep x 6 min (L5) NuStep x 5 min (L5) Upright bike x 5 min (L5) HEP review/updated/handout given   H/L PPT x 15 (5 second hold) H/L PPT x 15 (5 second hold) H/L PPT x 15 (5 second hold) H/L PPT x 20 (5 second hold) H/L PPT x 20 (5 second hold)   H/L HS stretch x 5 (15 second hold) each H/L HS stretch x 5 (15 second hold) each H/L HS stretch x 5 (15 second hold) each H/L HS stretch 30 sec x 2 H/L HS stretch 30 sec x 2   DKTC w/GPB x 2 min DKTC w/GPB x 2 min DKTC w/GPB x 2 min H/L piriformis stretch 20 sec x 4 H/L piriformis stretch 20 sec x 4   DKTC x 5 (15 second hold) H/L piriformis stretch 30 sec x 3 H/L piriformis stretch 30 sec x 3 H/L TrA ball press x 15 (5 second hold)    SKTC x 5 (10 second hold) each H/L TrA march x 20 H/L TrA march x 20 H/L TrA march x 20 (10 each side) H/L TrA march x 20 (10 each side)   H/L piriformis stretch 30 sec x 3 Seated lumbar flexion w/GPB x 10 (10 seconds) H/L TrA ball press x 15 (5 second hold) H/L clam RTB x 15 H/L clam RTB x 15   Seated lumbar flexion w/GPB x 10 (10 second hold) H/L TrA ball press x 15 (5 second hold) H/L clam GTB x 20 Seated lumbar flexion x 10 (10 seconds) Seated lumbar flexion x 10 (10 seconds)    H/L clam GTB x 20 Seated lumbar flexion w/GPB x 10 (10 seconds) Sit to stand x 10 Discharge note objective testing      Side stepping x 3 laps   (Attempted 2 laps with RTB but increased discomfort)    HEP:   Access Code: [email or web address]  URL: [email or web address]  Date: 11/22/2023  Prepared by:  Chad Course    Exercises  - Supine Posterior Pelvic Tilt  - 1-2 x daily - 7 x weekly - 3 sets - 10 reps  - Supine Piriformis Stretch with Foot on Ground  - 1-2 x daily - 7 x weekly - 3 sets - 30 seconds hold  - Supine Hamstring Stretch  - 1-2 x daily - 7 x weekly - 3 sets - 30 seconds hold  - Supine March  - 1-2 x daily - 7 x weekly - 2 sets - 10 reps  - Bent Knee Fallouts  - 1-2 x daily - 7 x weekly - 2 sets - 10 reps  - Hooklying Single Leg Bent Knee Fallouts with Resistance  - 1-2 x daily - 7 x weekly - 2 sets - 10 reps  - Hooklying Clamshell with Resistance  - 1-2 x daily - 7 x weekly - 2 sets - 10 reps  - Clamshell  - 1-2 x daily - 7 x weekly - 2 sets - 10 reps  - Sit to Stand  - 1-2 x daily - 7 x weekly - 2 sets - 10 reps    Charges:  TherEx 2      Total Timed Treatment: 30 min  Total Treatment Time: 30 min

## 2023-11-28 ENCOUNTER — LABORATORY ENCOUNTER (OUTPATIENT)
Dept: LAB | Age: 78
End: 2023-11-28
Attending: NEUROLOGICAL SURGERY
Payer: MEDICARE

## 2023-11-28 ENCOUNTER — OFFICE VISIT (OUTPATIENT)
Dept: FAMILY MEDICINE CLINIC | Facility: CLINIC | Age: 78
End: 2023-11-28
Payer: MEDICARE

## 2023-11-28 VITALS
SYSTOLIC BLOOD PRESSURE: 118 MMHG | OXYGEN SATURATION: 95 % | WEIGHT: 162 LBS | RESPIRATION RATE: 17 BRPM | DIASTOLIC BLOOD PRESSURE: 80 MMHG | HEART RATE: 66 BPM | HEIGHT: 72 IN | BODY MASS INDEX: 21.94 KG/M2

## 2023-11-28 DIAGNOSIS — F41.9 ANXIETY: ICD-10-CM

## 2023-11-28 DIAGNOSIS — M54.32 BILATERAL SCIATICA: ICD-10-CM

## 2023-11-28 DIAGNOSIS — J43.9 PULMONARY EMPHYSEMA, UNSPECIFIED EMPHYSEMA TYPE (HCC): ICD-10-CM

## 2023-11-28 DIAGNOSIS — Z01.818 PREOP GENERAL PHYSICAL EXAM: Primary | ICD-10-CM

## 2023-11-28 DIAGNOSIS — N52.9 ED (ERECTILE DYSFUNCTION) OF ORGANIC ORIGIN: ICD-10-CM

## 2023-11-28 DIAGNOSIS — S32.000A LUMBAR COMPRESSION FRACTURE, CLOSED, INITIAL ENCOUNTER (HCC): ICD-10-CM

## 2023-11-28 DIAGNOSIS — M54.31 BILATERAL SCIATICA: ICD-10-CM

## 2023-11-28 DIAGNOSIS — R01.1 HEART MURMUR, SYSTOLIC: ICD-10-CM

## 2023-11-28 PROBLEM — D64.9 ANEMIA: Status: RESOLVED | Noted: 2023-10-10 | Resolved: 2023-11-28

## 2023-11-28 PROBLEM — R78.81 BACTEREMIA: Status: RESOLVED | Noted: 2023-09-12 | Resolved: 2023-11-28

## 2023-11-28 LAB
ANTIBODY SCREEN: NEGATIVE
APTT PPP: 30 SECONDS (ref 23.3–35.6)
INR BLD: 1.04 (ref 0.8–1.2)
PROTHROMBIN TIME: 13.7 SECONDS (ref 11.6–14.8)
RH BLOOD TYPE: POSITIVE

## 2023-11-28 PROCEDURE — 86901 BLOOD TYPING SEROLOGIC RH(D): CPT

## 2023-11-28 PROCEDURE — 86900 BLOOD TYPING SEROLOGIC ABO: CPT

## 2023-11-28 PROCEDURE — 99215 OFFICE O/P EST HI 40 MIN: CPT | Performed by: FAMILY MEDICINE

## 2023-11-28 PROCEDURE — 3079F DIAST BP 80-89 MM HG: CPT | Performed by: FAMILY MEDICINE

## 2023-11-28 PROCEDURE — 1160F RVW MEDS BY RX/DR IN RCRD: CPT | Performed by: FAMILY MEDICINE

## 2023-11-28 PROCEDURE — 85730 THROMBOPLASTIN TIME PARTIAL: CPT

## 2023-11-28 PROCEDURE — 85610 PROTHROMBIN TIME: CPT

## 2023-11-28 PROCEDURE — 36415 COLL VENOUS BLD VENIPUNCTURE: CPT

## 2023-11-28 PROCEDURE — 3074F SYST BP LT 130 MM HG: CPT | Performed by: FAMILY MEDICINE

## 2023-11-28 PROCEDURE — 1159F MED LIST DOCD IN RCRD: CPT | Performed by: FAMILY MEDICINE

## 2023-11-28 PROCEDURE — 3008F BODY MASS INDEX DOCD: CPT | Performed by: FAMILY MEDICINE

## 2023-11-28 PROCEDURE — 86850 RBC ANTIBODY SCREEN: CPT

## 2023-11-28 PROCEDURE — 87081 CULTURE SCREEN ONLY: CPT

## 2023-11-28 RX ORDER — IPRATROPIUM BROMIDE 17 UG/1
2 AEROSOL, METERED RESPIRATORY (INHALATION) 2 TIMES DAILY
Qty: 1 EACH | Refills: 1 | Status: SHIPPED | OUTPATIENT
Start: 2023-11-28

## 2023-12-18 ENCOUNTER — PATIENT OUTREACH (OUTPATIENT)
Dept: CASE MANAGEMENT | Age: 78
End: 2023-12-18

## 2023-12-20 ENCOUNTER — TELEPHONE (OUTPATIENT)
Dept: SURGERY | Facility: CLINIC | Age: 78
End: 2023-12-20

## 2023-12-20 NOTE — TELEPHONE ENCOUNTER
Pt call to advised Patricio Rod appears my sx has been approve and I would like a call back to discuss a couple lose ends\". Pt would like call back.  Best contact [phone]

## 2023-12-30 ENCOUNTER — TELEPHONE (OUTPATIENT)
Dept: SURGERY | Facility: CLINIC | Age: 78
End: 2023-12-30

## 2023-12-31 NOTE — TELEPHONE ENCOUNTER
Received reminder to conduct post op outreach, however surgery had to be rescheduled from December to January date due to late approval date by insurance carrier.

## 2024-01-12 ENCOUNTER — LABORATORY ENCOUNTER (OUTPATIENT)
Dept: LAB | Age: 79
End: 2024-01-12
Attending: NEUROLOGICAL SURGERY
Payer: COMMERCIAL

## 2024-01-12 ENCOUNTER — OFFICE VISIT (OUTPATIENT)
Dept: FAMILY MEDICINE CLINIC | Facility: CLINIC | Age: 79
End: 2024-01-12
Payer: MEDICARE

## 2024-01-12 ENCOUNTER — EKG ENCOUNTER (OUTPATIENT)
Dept: LAB | Age: 79
End: 2024-01-12
Attending: NEUROLOGICAL SURGERY
Payer: COMMERCIAL

## 2024-01-12 VITALS
WEIGHT: 165 LBS | HEART RATE: 61 BPM | OXYGEN SATURATION: 96 % | BODY MASS INDEX: 22.35 KG/M2 | HEIGHT: 72 IN | RESPIRATION RATE: 16 BRPM | SYSTOLIC BLOOD PRESSURE: 122 MMHG | DIASTOLIC BLOOD PRESSURE: 64 MMHG

## 2024-01-12 DIAGNOSIS — J43.9 PULMONARY EMPHYSEMA, UNSPECIFIED EMPHYSEMA TYPE (HCC): ICD-10-CM

## 2024-01-12 DIAGNOSIS — G25.3 MYOCLONIC JERKING: ICD-10-CM

## 2024-01-12 DIAGNOSIS — S32.000A LUMBAR COMPRESSION FRACTURE, CLOSED, INITIAL ENCOUNTER (HCC): ICD-10-CM

## 2024-01-12 DIAGNOSIS — M54.31 BILATERAL SCIATICA: Primary | ICD-10-CM

## 2024-01-12 DIAGNOSIS — Z01.818 PREOP GENERAL PHYSICAL EXAM: ICD-10-CM

## 2024-01-12 DIAGNOSIS — M54.32 BILATERAL SCIATICA: Primary | ICD-10-CM

## 2024-01-12 LAB
ALBUMIN SERPL-MCNC: 4 G/DL (ref 3.4–5)
ALBUMIN/GLOB SERPL: 1.2 {RATIO} (ref 1–2)
ALP LIVER SERPL-CCNC: 68 U/L
ALT SERPL-CCNC: 23 U/L
ANION GAP SERPL CALC-SCNC: 4 MMOL/L (ref 0–18)
ANTIBODY SCREEN: NEGATIVE
APTT PPP: 29.7 SECONDS (ref 23.3–35.6)
AST SERPL-CCNC: 19 U/L (ref 15–37)
ATRIAL RATE: 58 BPM
BASOPHILS # BLD AUTO: 0.04 X10(3) UL (ref 0–0.2)
BASOPHILS NFR BLD AUTO: 1 %
BILIRUB SERPL-MCNC: 0.4 MG/DL (ref 0.1–2)
BUN BLD-MCNC: 40 MG/DL (ref 9–23)
CALCIUM BLD-MCNC: 9.3 MG/DL (ref 8.5–10.1)
CHLORIDE SERPL-SCNC: 112 MMOL/L (ref 98–112)
CO2 SERPL-SCNC: 24 MMOL/L (ref 21–32)
CREAT BLD-MCNC: 1.48 MG/DL
EGFRCR SERPLBLD CKD-EPI 2021: 48 ML/MIN/1.73M2 (ref 60–?)
EOSINOPHIL # BLD AUTO: 0.21 X10(3) UL (ref 0–0.7)
EOSINOPHIL NFR BLD AUTO: 5.5 %
ERYTHROCYTE [DISTWIDTH] IN BLOOD BY AUTOMATED COUNT: 12.9 %
GLOBULIN PLAS-MCNC: 3.4 G/DL (ref 2.8–4.4)
GLUCOSE BLD-MCNC: 101 MG/DL (ref 70–99)
HCT VFR BLD AUTO: 37.3 %
HGB BLD-MCNC: 12.1 G/DL
IMM GRANULOCYTES # BLD AUTO: 0.02 X10(3) UL (ref 0–1)
IMM GRANULOCYTES NFR BLD: 0.5 %
INR BLD: 1.07 (ref 0.8–1.2)
LYMPHOCYTES # BLD AUTO: 0.61 X10(3) UL (ref 1–4)
LYMPHOCYTES NFR BLD AUTO: 16 %
MCH RBC QN AUTO: 32.4 PG (ref 26–34)
MCHC RBC AUTO-ENTMCNC: 32.4 G/DL (ref 31–37)
MCV RBC AUTO: 100 FL
MONOCYTES # BLD AUTO: 0.39 X10(3) UL (ref 0.1–1)
MONOCYTES NFR BLD AUTO: 10.2 %
NEUTROPHILS # BLD AUTO: 2.54 X10 (3) UL (ref 1.5–7.7)
NEUTROPHILS # BLD AUTO: 2.54 X10(3) UL (ref 1.5–7.7)
NEUTROPHILS NFR BLD AUTO: 66.8 %
OSMOLALITY SERPL CALC.SUM OF ELEC: 300 MOSM/KG (ref 275–295)
P AXIS: 68 DEGREES
P-R INTERVAL: 162 MS
PLATELET # BLD AUTO: 212 10(3)UL (ref 150–450)
POTASSIUM SERPL-SCNC: 5 MMOL/L (ref 3.5–5.1)
PROT SERPL-MCNC: 7.4 G/DL (ref 6.4–8.2)
PROTHROMBIN TIME: 13.9 SECONDS (ref 11.6–14.8)
Q-T INTERVAL: 406 MS
QRS DURATION: 84 MS
QTC CALCULATION (BEZET): 398 MS
R AXIS: 66 DEGREES
RBC # BLD AUTO: 3.73 X10(6)UL
RH BLOOD TYPE: POSITIVE
SODIUM SERPL-SCNC: 140 MMOL/L (ref 136–145)
T AXIS: 73 DEGREES
VENTRICULAR RATE: 58 BPM
WBC # BLD AUTO: 3.8 X10(3) UL (ref 4–11)

## 2024-01-12 PROCEDURE — 86901 BLOOD TYPING SEROLOGIC RH(D): CPT

## 2024-01-12 PROCEDURE — 93010 ELECTROCARDIOGRAM REPORT: CPT | Performed by: INTERNAL MEDICINE

## 2024-01-12 PROCEDURE — 1159F MED LIST DOCD IN RCRD: CPT | Performed by: FAMILY MEDICINE

## 2024-01-12 PROCEDURE — 1160F RVW MEDS BY RX/DR IN RCRD: CPT | Performed by: FAMILY MEDICINE

## 2024-01-12 PROCEDURE — 85025 COMPLETE CBC W/AUTO DIFF WBC: CPT

## 2024-01-12 PROCEDURE — 87081 CULTURE SCREEN ONLY: CPT

## 2024-01-12 PROCEDURE — 3008F BODY MASS INDEX DOCD: CPT | Performed by: FAMILY MEDICINE

## 2024-01-12 PROCEDURE — 86900 BLOOD TYPING SEROLOGIC ABO: CPT

## 2024-01-12 PROCEDURE — 99215 OFFICE O/P EST HI 40 MIN: CPT | Performed by: FAMILY MEDICINE

## 2024-01-12 PROCEDURE — 80053 COMPREHEN METABOLIC PANEL: CPT

## 2024-01-12 PROCEDURE — 85610 PROTHROMBIN TIME: CPT

## 2024-01-12 PROCEDURE — 93005 ELECTROCARDIOGRAM TRACING: CPT

## 2024-01-12 PROCEDURE — 86850 RBC ANTIBODY SCREEN: CPT

## 2024-01-12 PROCEDURE — 85730 THROMBOPLASTIN TIME PARTIAL: CPT

## 2024-01-12 PROCEDURE — 3074F SYST BP LT 130 MM HG: CPT | Performed by: FAMILY MEDICINE

## 2024-01-12 PROCEDURE — 36415 COLL VENOUS BLD VENIPUNCTURE: CPT

## 2024-01-12 PROCEDURE — 3078F DIAST BP <80 MM HG: CPT | Performed by: FAMILY MEDICINE

## 2024-01-12 RX ORDER — IPRATROPIUM BROMIDE 17 UG/1
2 AEROSOL, METERED RESPIRATORY (INHALATION) 3 TIMES DAILY
Qty: 1 EACH | Refills: 2 | Status: SHIPPED | OUTPATIENT
Start: 2024-01-12

## 2024-01-12 RX ORDER — GABAPENTIN 100 MG/1
CAPSULE ORAL
Qty: 75 CAPSULE | Refills: 0 | Status: SHIPPED | OUTPATIENT
Start: 2024-01-12 | End: 2024-02-11

## 2024-01-12 NOTE — PROGRESS NOTES
HPI:  Here for pre-operative evaluation requested by Salvador Sebastian.  Will have lumbar 3 and lumbar 4 laminectomies with medial facetectomies at [facility] on January 29, 2024.  Procedure has been canceled twice previously due to insurance.    Having jerking of the legs when he is trying to fall asleep at night.  Can fall asleep till 2 in the morning.  He is reading a novel to try to make himself drowsy.  He has taken melatonin without relief.  He asks about clonazepam which she researched online.    Emphysema.  Using Atrovent 2 relations twice a day.  Was downtown trying to go up 4 flights of stairs.  After 3 flights had to stop because of shortness of breath.    PAST MEDICAL HISTORY:  Past Medical History:   Diagnosis Date    Adverse drug reaction 12/10/2014    Age-related cataract of both eyes 08/04/2021    Arthritis 2010    Asthma     Back pain 2010    Back problem     Benign neoplasm of colon     BPH (benign prostatic hyperplasia) 07/29/2013    Calculus of kidney ?    COPD (chronic obstructive pulmonary disease) ([facility])     NO HOME O2    Coronary atherosclerosis     Diverticulosis of colon (without mention of hemorrhage)     Easy bruising 2010    Esophageal reflux     Flatulence/gas pain/belching occasional    Frequent urination 2010    Frequent use of laxatives occasional    Heartburn 1998?    Hemorrhoids ?    High blood pressure     High cholesterol     History of cardiac murmur ?    Hx of heart artery stent     Prostate nodule 07/27/2016    Had biopsy 2016    Stented coronary artery ?    one    Unspecified hemorrhoids without mention of complication 06/29/2012    Visual impairment     READING GLASSES    Wears glasses 2020    Wheezing 2010     PAST SURGICAL HISTORY:  Past Surgical History:   Procedure Laterality Date    ANGIOPLASTY (CORONARY)  05/2011    APPENDECTOMY  1949    CATARACT Bilateral 11/2021    cataract removed    COLONOSCOPY  11/2012    + polyps    INSERT INTRACORONARY STENT      OTHER  SURGICAL HISTORY  02/02/2022    Cystoscopy Dr. Olmos     TONSILLECTOMY      UPPER GI ENDOSCOPY - REFERRAL  03/22/2012    esophagitis, repeat in 5 year     MEDICATIONS:  Current Outpatient Medications   Medication Sig Dispense Refill    ipratropium (ATROVENT HFA) 17 MCG/ACT Inhalation Aero Soln Inhale 2 puffs into the lungs 3 (three) times daily. 1 each 2    gabapentin 100 MG Oral Cap Take 1 capsule (100 mg total) by mouth nightly for 5 days, THEN 2 capsules (200 mg total) nightly for 5 days, THEN 3 capsules (300 mg total) nightly for 20 days. 75 capsule 0    silodosin (RAPAFLO) 8 MG Oral Cap Take 1 capsule (8 mg total) by mouth every evening. 90 capsule 5    Ferrous Sulfate 325 (65 Fe) MG Oral Tab Take 1 tablet (325 mg total) by mouth daily with breakfast.      finasteride 5 MG Oral Tab Take 1 tablet (5 mg total) by mouth daily. 90 tablet 6    Ibuprofen-Acetaminophen (ADVIL DUAL ACTION OR) Take by mouth.      calcitonin, salmon, 200 UNIT/ACT Nasal Solution 1 spray by Nasal route daily. 1 each 2    sertraline 50 MG Oral Tab Take 1 tablet (50 mg total) by mouth daily. 90 tablet 1    salmeterol 50 MCG/ACT Inhalation Aerosol Powder, Breath Activated Inhale 1 puff into the lungs 2 (two) times daily. 3 each 1    Meloxicam 15 MG Oral Tab Take 1 tablet (15 mg total) by mouth daily. 90 tablet 3    metoprolol succinate ER 25 MG Oral Tablet 24 Hr Take 1 tablet (25 mg total) by mouth daily. 90 tablet 3    Sildenafil Citrate 100 MG Oral Tab Take 1 tablet (100 mg total) by mouth as needed for Erectile Dysfunction. 4 tablet 11    simvastatin 20 MG Oral Tab TAKE 1 TABLET BY MOUTH EVERY NIGHT AT BEDTIME 90 tablet 3    losartan 50 MG Oral Tab Take 1 tablet (50 mg total) by mouth daily. 90 tablet 3    Omeprazole Magnesium 20 MG Oral Tab EC Take 1 tablet (20 mg total) by mouth daily. 90 tablet 3    Aspirin 81 MG Oral Tab Take 1 tablet (81 mg total) by mouth daily.       ALLERGIES: Patient has no known allergies.  Social History:    Social History     Socioeconomic History    Marital status: Life Partner     Spouse name: Not on file    Number of children: Not on file    Years of education: Not on file    Highest education level: Not on file   Occupational History    Not on file   Tobacco Use    Smoking status: Former     Packs/day: 1.00     Years: 40.00     Additional pack years: 0.00     Total pack years: 40.00     Types: Cigarettes     Start date: 1968     Quit date: 2011     Years since quittin.6    Smokeless tobacco: Never   Vaping Use    Vaping Use: Never used   Substance and Sexual Activity    Alcohol use: No     Comment: Quit in . Prior had been a heavier drinker.    Drug use: Not Currently     Types: Cannabis    Sexual activity: Not on file   Other Topics Concern     Service Not Asked    Blood Transfusions Not Asked    Caffeine Concern Yes    Occupational Exposure Not Asked    Hobby Hazards Not Asked    Sleep Concern Not Asked    Stress Concern Not Asked    Weight Concern Not Asked    Special Diet Not Asked    Back Care Not Asked    Exercise Yes     Comment: 6x/week    Bike Helmet Not Asked    Seat Belt Not Asked    Self-Exams Not Asked   Social History Narrative    Not on file     Social Determinants of Health     Financial Resource Strain: Low Risk  (2023)    Financial Resource Strain     Difficulty of Paying Living Expenses: Not very hard     Med Affordability: No   Food Insecurity: Not on file   Transportation Needs: No Transportation Needs (2023)    Transportation Needs     Lack of Transportation: No   Physical Activity: Not on file   Stress: Not on file   Social Connections: Not on file   Housing Stability: Not on file       EXAM:  /64   Pulse 61   Resp 16   Ht 6' (1.829 m)   Wt 165 lb (74.8 kg)   SpO2 96%   BMI 22.38 kg/m²   NAD  GENERAL: well developed, well nourished, in no apparent distress.  EARS: Bilateral pinna / tragus are WNL in appearance, External canals patent and  without inflammation. Bilateral tympanic membranes pearly white. No effusions noted. Hearing grossly normal.  EYES: PERRLA, EOMI, bilateral sclera anicteric, non-injected. Conjunctiva pink.  OROPHARYNX: Mucosa moist without lesions. Dentition intact and gums appear healthy.  NECK: Supple, No lymphadenopathy. No thyromegaly or lesions palpated.  CARDIOVASCULAR: RRR, NL S1 and S2, 3 out of 6 systolic decrescendo murmur at the right upper sternal border consistent with aortic sclerosis.  Bilateral carotids without bruit. No abdominal bruits auscultated. Bilateral dorsalis pedis and posterior tibial pulses 2+/4+. No edema of the bilateral lower extremities. No JVD noted.  PULMONARY: CTA bilaterally, good air exchange, no rhonchi, wheezes, or rales. No dullness to percussion.  ABDOMEN: Soft, nontender, nondistended, NABS. No HSM; no masses; no bruits.   LYMPHATIC: no lymphadenopathy palpated about the anterior and posterior cervical chains, submandibular, supraclavicular, and inguinal areas.  EXTREMITIES / MUSCULOSKELETAL: 4 extremities with grossly normal ROM. Gait NL. No evidence of any synovitis. No cyanosis, clubbing or edema.  NEUROLOGIC: CN's II-XII grossly intact,  Alert and orientated.    ECG: Pending however he had a negative stress test on October 16, 2023.  He has not had any cardiac symptoms.    ASSESSMENT / PLAN:  CLEARED for procedure.  A copy of this H&P will go to the referring physician and/or the surgical site as appropriate.  LABS ordered: PT INR, PTT, type and screen, MRSA swab.    Myoclonic jerking may be secondary to the lumbar pathology.  He is not sleeping well.  I have asked him to try gabapentin 100 mg at bedtime for 5 nights may increase it to 200 mg for after 5 nights and then 300 mg after another 5 nights if necessary.  After his surgery we will hopefully be able to take him off of this medication.    Pulmonary emphysema.  Due to the symptoms we will increase his Atrovent to 2 and elations 3  times a day.    Level 5 visit, level 4 for preop exam and another level for managing 2 additional problems myoclonic jerking and pulmonary emphysema.    Patient understood above plan and agreed to do labs within the next 30 days as well as to make all appointments for referrals if given within the next 30 days. Patient understands to contact office if unable to do so.

## 2024-01-29 ENCOUNTER — HOSPITAL ENCOUNTER (OUTPATIENT)
Facility: HOSPITAL | Age: 79
Discharge: HOME | End: 2024-01-30
Attending: NEUROLOGICAL SURGERY | Admitting: NEUROLOGICAL SURGERY
Payer: MEDICARE

## 2024-01-29 ENCOUNTER — APPOINTMENT (OUTPATIENT)
Dept: GENERAL RADIOLOGY | Facility: HOSPITAL | Age: 79
End: 2024-01-29
Attending: NEUROLOGICAL SURGERY
Payer: MEDICARE

## 2024-01-29 ENCOUNTER — ANESTHESIA EVENT (OUTPATIENT)
Dept: SURGERY | Facility: HOSPITAL | Age: 79
End: 2024-01-29
Payer: MEDICARE

## 2024-01-29 ENCOUNTER — ANESTHESIA (OUTPATIENT)
Dept: SURGERY | Facility: HOSPITAL | Age: 79
End: 2024-01-29
Payer: MEDICARE

## 2024-01-29 DIAGNOSIS — G89.18 POST-OP PAIN: ICD-10-CM

## 2024-01-29 DIAGNOSIS — S32.000A LUMBAR COMPRESSION FRACTURE, CLOSED, INITIAL ENCOUNTER (HCC): Primary | ICD-10-CM

## 2024-01-29 PROBLEM — M48.062 LUMBAR STENOSIS WITH NEUROGENIC CLAUDICATION: Status: ACTIVE | Noted: 2024-01-29

## 2024-01-29 PROCEDURE — 76000 FLUOROSCOPY <1 HR PHYS/QHP: CPT | Performed by: NEUROLOGICAL SURGERY

## 2024-01-29 PROCEDURE — 99214 OFFICE O/P EST MOD 30 MIN: CPT | Performed by: HOSPITALIST

## 2024-01-29 PROCEDURE — 00NY0ZZ RELEASE LUMBAR SPINAL CORD, OPEN APPROACH: ICD-10-PCS | Performed by: NEUROLOGICAL SURGERY

## 2024-01-29 DEVICE — COLLAGEN DURAL REGENERATION MEMBRANE 1IN X 1IN (2.5CM X 2.5CM)
Type: IMPLANTABLE DEVICE | Site: BACK | Status: FUNCTIONAL
Brand: DURAMATRIX-ONLAY PLUS

## 2024-01-29 DEVICE — IMPLANTABLE DEVICE: Type: IMPLANTABLE DEVICE | Site: BACK | Status: FUNCTIONAL

## 2024-01-29 RX ORDER — SODIUM CHLORIDE, SODIUM LACTATE, POTASSIUM CHLORIDE, CALCIUM CHLORIDE 600; 310; 30; 20 MG/100ML; MG/100ML; MG/100ML; MG/100ML
INJECTION, SOLUTION INTRAVENOUS CONTINUOUS
Status: DISCONTINUED | OUTPATIENT
Start: 2024-01-29 | End: 2024-01-30

## 2024-01-29 RX ORDER — SENNOSIDES 8.6 MG
17.2 TABLET ORAL NIGHTLY
Status: DISCONTINUED | OUTPATIENT
Start: 2024-01-29 | End: 2024-01-30

## 2024-01-29 RX ORDER — PANTOPRAZOLE SODIUM 20 MG/1
20 TABLET, DELAYED RELEASE ORAL
Status: DISCONTINUED | OUTPATIENT
Start: 2024-01-30 | End: 2024-01-30

## 2024-01-29 RX ORDER — METOCLOPRAMIDE HYDROCHLORIDE 5 MG/ML
10 INJECTION INTRAMUSCULAR; INTRAVENOUS EVERY 8 HOURS PRN
Status: DISCONTINUED | OUTPATIENT
Start: 2024-01-29 | End: 2024-01-29 | Stop reason: HOSPADM

## 2024-01-29 RX ORDER — ONDANSETRON 2 MG/ML
4 INJECTION INTRAMUSCULAR; INTRAVENOUS EVERY 6 HOURS PRN
Status: DISCONTINUED | OUTPATIENT
Start: 2024-01-29 | End: 2024-01-29 | Stop reason: HOSPADM

## 2024-01-29 RX ORDER — METOCLOPRAMIDE HYDROCHLORIDE 5 MG/ML
10 INJECTION INTRAMUSCULAR; INTRAVENOUS EVERY 8 HOURS PRN
Status: DISCONTINUED | OUTPATIENT
Start: 2024-01-29 | End: 2024-01-30

## 2024-01-29 RX ORDER — DOCUSATE SODIUM 100 MG/1
100 CAPSULE, LIQUID FILLED ORAL 2 TIMES DAILY
Status: DISCONTINUED | OUTPATIENT
Start: 2024-01-29 | End: 2024-01-30

## 2024-01-29 RX ORDER — ACETAMINOPHEN 500 MG
1000 TABLET ORAL EVERY 8 HOURS SCHEDULED
Status: DISCONTINUED | OUTPATIENT
Start: 2024-01-29 | End: 2024-01-30

## 2024-01-29 RX ORDER — HYDROMORPHONE HYDROCHLORIDE 1 MG/ML
INJECTION, SOLUTION INTRAMUSCULAR; INTRAVENOUS; SUBCUTANEOUS
Status: COMPLETED
Start: 2024-01-29 | End: 2024-01-29

## 2024-01-29 RX ORDER — ACETAMINOPHEN 500 MG
1000 TABLET ORAL ONCE
Status: DISCONTINUED | OUTPATIENT
Start: 2024-01-29 | End: 2024-01-29 | Stop reason: HOSPADM

## 2024-01-29 RX ORDER — BISACODYL 10 MG
10 SUPPOSITORY, RECTAL RECTAL
Status: DISCONTINUED | OUTPATIENT
Start: 2024-01-29 | End: 2024-01-30

## 2024-01-29 RX ORDER — METOPROLOL SUCCINATE 25 MG/1
25 TABLET, EXTENDED RELEASE ORAL
Status: DISCONTINUED | OUTPATIENT
Start: 2024-01-30 | End: 2024-01-30

## 2024-01-29 RX ORDER — DEXAMETHASONE SODIUM PHOSPHATE 4 MG/ML
VIAL (ML) INJECTION AS NEEDED
Status: DISCONTINUED | OUTPATIENT
Start: 2024-01-29 | End: 2024-01-29 | Stop reason: SURG

## 2024-01-29 RX ORDER — CYCLOBENZAPRINE HCL 5 MG
2.5 TABLET ORAL EVERY 6 HOURS PRN
Status: DISCONTINUED | OUTPATIENT
Start: 2024-01-29 | End: 2024-01-30

## 2024-01-29 RX ORDER — HYDROMORPHONE HYDROCHLORIDE 1 MG/ML
0.6 INJECTION, SOLUTION INTRAMUSCULAR; INTRAVENOUS; SUBCUTANEOUS EVERY 5 MIN PRN
Status: DISCONTINUED | OUTPATIENT
Start: 2024-01-29 | End: 2024-01-29 | Stop reason: HOSPADM

## 2024-01-29 RX ORDER — DIPHENHYDRAMINE HYDROCHLORIDE 50 MG/ML
25 INJECTION INTRAMUSCULAR; INTRAVENOUS EVERY 4 HOURS PRN
Status: DISCONTINUED | OUTPATIENT
Start: 2024-01-29 | End: 2024-01-30

## 2024-01-29 RX ORDER — TRAMADOL HYDROCHLORIDE 50 MG/1
TABLET ORAL EVERY 6 HOURS PRN
Status: DISCONTINUED | OUTPATIENT
Start: 2024-01-29 | End: 2024-01-30

## 2024-01-29 RX ORDER — LABETALOL HYDROCHLORIDE 5 MG/ML
5 INJECTION, SOLUTION INTRAVENOUS EVERY 5 MIN PRN
Status: DISCONTINUED | OUTPATIENT
Start: 2024-01-29 | End: 2024-01-29 | Stop reason: HOSPADM

## 2024-01-29 RX ORDER — CEFAZOLIN SODIUM/WATER 2 G/20 ML
2 SYRINGE (ML) INTRAVENOUS ONCE
Status: COMPLETED | OUTPATIENT
Start: 2024-01-29 | End: 2024-01-29

## 2024-01-29 RX ORDER — MIDAZOLAM HYDROCHLORIDE 1 MG/ML
1 INJECTION INTRAMUSCULAR; INTRAVENOUS EVERY 5 MIN PRN
Status: DISCONTINUED | OUTPATIENT
Start: 2024-01-29 | End: 2024-01-29 | Stop reason: HOSPADM

## 2024-01-29 RX ORDER — LOSARTAN POTASSIUM 50 MG/1
50 TABLET ORAL DAILY
Status: DISCONTINUED | OUTPATIENT
Start: 2024-01-30 | End: 2024-01-30

## 2024-01-29 RX ORDER — FINASTERIDE 5 MG/1
5 TABLET, FILM COATED ORAL DAILY
Status: DISCONTINUED | OUTPATIENT
Start: 2024-01-30 | End: 2024-01-30

## 2024-01-29 RX ORDER — ATORVASTATIN CALCIUM 10 MG/1
10 TABLET, FILM COATED ORAL NIGHTLY
Status: DISCONTINUED | OUTPATIENT
Start: 2024-01-29 | End: 2024-01-30

## 2024-01-29 RX ORDER — HYDROMORPHONE HYDROCHLORIDE 1 MG/ML
0.2 INJECTION, SOLUTION INTRAMUSCULAR; INTRAVENOUS; SUBCUTANEOUS EVERY 5 MIN PRN
Status: DISCONTINUED | OUTPATIENT
Start: 2024-01-29 | End: 2024-01-29 | Stop reason: HOSPADM

## 2024-01-29 RX ORDER — NALOXONE HYDROCHLORIDE 0.4 MG/ML
0.08 INJECTION, SOLUTION INTRAMUSCULAR; INTRAVENOUS; SUBCUTANEOUS AS NEEDED
Status: DISCONTINUED | OUTPATIENT
Start: 2024-01-29 | End: 2024-01-29 | Stop reason: HOSPADM

## 2024-01-29 RX ORDER — MEPERIDINE HYDROCHLORIDE 25 MG/ML
12.5 INJECTION INTRAMUSCULAR; INTRAVENOUS; SUBCUTANEOUS AS NEEDED
Status: DISCONTINUED | OUTPATIENT
Start: 2024-01-29 | End: 2024-01-29 | Stop reason: HOSPADM

## 2024-01-29 RX ORDER — SODIUM CHLORIDE 9 MG/ML
INJECTION, SOLUTION INTRAVENOUS CONTINUOUS
Status: DISCONTINUED | OUTPATIENT
Start: 2024-01-29 | End: 2024-01-30

## 2024-01-29 RX ORDER — GLYCOPYRROLATE 0.2 MG/ML
INJECTION, SOLUTION INTRAMUSCULAR; INTRAVENOUS AS NEEDED
Status: DISCONTINUED | OUTPATIENT
Start: 2024-01-29 | End: 2024-01-29 | Stop reason: SURG

## 2024-01-29 RX ORDER — ONDANSETRON 2 MG/ML
INJECTION INTRAMUSCULAR; INTRAVENOUS AS NEEDED
Status: DISCONTINUED | OUTPATIENT
Start: 2024-01-29 | End: 2024-01-29 | Stop reason: SURG

## 2024-01-29 RX ORDER — SODIUM CHLORIDE, SODIUM LACTATE, POTASSIUM CHLORIDE, CALCIUM CHLORIDE 600; 310; 30; 20 MG/100ML; MG/100ML; MG/100ML; MG/100ML
INJECTION, SOLUTION INTRAVENOUS CONTINUOUS
Status: DISCONTINUED | OUTPATIENT
Start: 2024-01-29 | End: 2024-01-29 | Stop reason: HOSPADM

## 2024-01-29 RX ORDER — TAMSULOSIN HYDROCHLORIDE 0.4 MG/1
0.4 CAPSULE ORAL EVERY EVENING
Status: DISCONTINUED | OUTPATIENT
Start: 2024-01-29 | End: 2024-01-30

## 2024-01-29 RX ORDER — ALBUTEROL SULFATE 90 UG/1
2 AEROSOL, METERED RESPIRATORY (INHALATION) 4 TIMES DAILY
Status: DISCONTINUED | OUTPATIENT
Start: 2024-01-29 | End: 2024-01-30

## 2024-01-29 RX ORDER — DIAZEPAM 2 MG/1
2 TABLET ORAL EVERY 6 HOURS PRN
Status: DISCONTINUED | OUTPATIENT
Start: 2024-01-29 | End: 2024-01-30

## 2024-01-29 RX ORDER — ENEMA 19; 7 G/133ML; G/133ML
1 ENEMA RECTAL ONCE AS NEEDED
Status: DISCONTINUED | OUTPATIENT
Start: 2024-01-29 | End: 2024-01-30

## 2024-01-29 RX ORDER — ROCURONIUM BROMIDE 10 MG/ML
INJECTION, SOLUTION INTRAVENOUS AS NEEDED
Status: DISCONTINUED | OUTPATIENT
Start: 2024-01-29 | End: 2024-01-29 | Stop reason: SURG

## 2024-01-29 RX ORDER — BUPIVACAINE HYDROCHLORIDE AND EPINEPHRINE 2.5; 5 MG/ML; UG/ML
INJECTION, SOLUTION EPIDURAL; INFILTRATION; INTRACAUDAL; PERINEURAL AS NEEDED
Status: DISCONTINUED | OUTPATIENT
Start: 2024-01-29 | End: 2024-01-29 | Stop reason: HOSPADM

## 2024-01-29 RX ORDER — NEOSTIGMINE METHYLSULFATE 1 MG/ML
INJECTION, SOLUTION INTRAVENOUS AS NEEDED
Status: DISCONTINUED | OUTPATIENT
Start: 2024-01-29 | End: 2024-01-29 | Stop reason: SURG

## 2024-01-29 RX ORDER — DIPHENHYDRAMINE HCL 25 MG
25 CAPSULE ORAL EVERY 4 HOURS PRN
Status: DISCONTINUED | OUTPATIENT
Start: 2024-01-29 | End: 2024-01-30

## 2024-01-29 RX ORDER — ONDANSETRON 2 MG/ML
4 INJECTION INTRAMUSCULAR; INTRAVENOUS EVERY 6 HOURS PRN
Status: DISCONTINUED | OUTPATIENT
Start: 2024-01-29 | End: 2024-01-30

## 2024-01-29 RX ORDER — POLYETHYLENE GLYCOL 3350 17 G/17G
17 POWDER, FOR SOLUTION ORAL DAILY PRN
Status: DISCONTINUED | OUTPATIENT
Start: 2024-01-29 | End: 2024-01-30

## 2024-01-29 RX ORDER — HYDROMORPHONE HYDROCHLORIDE 1 MG/ML
0.4 INJECTION, SOLUTION INTRAMUSCULAR; INTRAVENOUS; SUBCUTANEOUS EVERY 5 MIN PRN
Status: DISCONTINUED | OUTPATIENT
Start: 2024-01-29 | End: 2024-01-29 | Stop reason: HOSPADM

## 2024-01-29 RX ORDER — CEFAZOLIN SODIUM/WATER 2 G/20 ML
2 SYRINGE (ML) INTRAVENOUS EVERY 8 HOURS
Status: COMPLETED | OUTPATIENT
Start: 2024-01-29 | End: 2024-01-29

## 2024-01-29 RX ADMIN — CEFAZOLIN SODIUM/WATER 2 G: 2 G/20 ML SYRINGE (ML) INTRAVENOUS at 11:10:00

## 2024-01-29 RX ADMIN — GLYCOPYRROLATE 0.8 MG: 0.2 INJECTION, SOLUTION INTRAMUSCULAR; INTRAVENOUS at 12:39:00

## 2024-01-29 RX ADMIN — SODIUM CHLORIDE, SODIUM LACTATE, POTASSIUM CHLORIDE, CALCIUM CHLORIDE: 600; 310; 30; 20 INJECTION, SOLUTION INTRAVENOUS at 10:52:00

## 2024-01-29 RX ADMIN — SODIUM CHLORIDE, SODIUM LACTATE, POTASSIUM CHLORIDE, CALCIUM CHLORIDE: 600; 310; 30; 20 INJECTION, SOLUTION INTRAVENOUS at 12:56:00

## 2024-01-29 RX ADMIN — GLYCOPYRROLATE 0.4 MG: 0.2 INJECTION, SOLUTION INTRAMUSCULAR; INTRAVENOUS at 11:32:00

## 2024-01-29 RX ADMIN — NEOSTIGMINE METHYLSULFATE 5 MG: 1 INJECTION, SOLUTION INTRAVENOUS at 12:39:00

## 2024-01-29 RX ADMIN — SODIUM CHLORIDE, SODIUM LACTATE, POTASSIUM CHLORIDE, CALCIUM CHLORIDE: 600; 310; 30; 20 INJECTION, SOLUTION INTRAVENOUS at 12:33:00

## 2024-01-29 RX ADMIN — ONDANSETRON 4 MG: 2 INJECTION INTRAMUSCULAR; INTRAVENOUS at 12:39:00

## 2024-01-29 RX ADMIN — DEXAMETHASONE SODIUM PHOSPHATE 8 MG: 4 MG/ML VIAL (ML) INJECTION at 11:10:00

## 2024-01-29 RX ADMIN — ROCURONIUM BROMIDE 50 MG: 10 INJECTION, SOLUTION INTRAVENOUS at 10:57:00

## 2024-01-29 NOTE — H&P
Neurosurgery History & Physical Examination    Patient Name: Dev Johnston  MRN: [id number]  CSN: [id number]  YOB: 1945    Diagnosis:  Lumbar stenosis    Present Illness: Very pleasant presents today for planned procedure.  Pt was last seen in office on 10/19.  Due to insurance denial, his surgery was delayed until today.  Pt denies changes in HPI since his LOV.  He denies chest pain, shortness of breath, fever, or cough    Previous HPI 10/19  Dev Johnston is a(n) 78 year old male here for follow-up  He got his x-rays  His pain is fracture sites  He is having some knee pain lower thoracic upper lumbar spine  He notes a new bulge there  He is here for follow-up    Has been NPO Yes  Does not take ASA, Plavix, or other anticoagulants.  Denies recent illness such as fevers or GI symptoms.  Labs from 1/12/2024 reviewed: BMP, CBC, PT/INR all WNL.    Current Facility-Administered Medications   Medication Dose Route Frequency    acetaminophen (Tylenol Extra Strength) tab 1,000 mg  1,000 mg Oral Once    lactated ringers infusion   Intravenous Continuous    ceFAZolin (Ancef) 2 g in 20mL IV syringe premix  2 g Intravenous Once       Allergies: No Known Allergies    Past Medical History:   Diagnosis Date    Adverse drug reaction 12/10/2014    Age-related cataract of both eyes 08/04/2021    Arthritis 2010    Asthma     Back pain 2010    Back problem     Benign neoplasm of colon     BPH (benign prostatic hyperplasia) 07/29/2013    Calculus of kidney ?    COPD (chronic obstructive pulmonary disease) ([facility])     NO HOME O2    Coronary atherosclerosis     Diverticulosis of colon (without mention of hemorrhage)     Easy bruising 2010    Esophageal reflux     Flatulence/gas pain/belching occasional    Frequent urination 2010    Frequent use of laxatives occasional    Heartburn 1998?    Hemorrhoids ?    High blood pressure     High cholesterol     History of cardiac murmur ?    Hx of heart artery stent      Prostate nodule 2016    Had biopsy 2016    Stented coronary artery ?    one    Unspecified hemorrhoids without mention of complication 2012    Visual impairment     READING GLASSES    Wears glasses 2020     Past Surgical History:   Procedure Laterality Date    ANGIOPLASTY (CORONARY)  2011    APPENDECTOMY  1949    CATARACT Bilateral 2021    cataract removed    COLONOSCOPY  2012    + polyps    INSERT INTRACORONARY STENT      OTHER SURGICAL HISTORY  2022    Cystoscopy Dr. Olmos     TONSILLECTOMY      UPPER GI ENDOSCOPY - REFERRAL  2012    esophagitis, repeat in 5 year     Family History   Problem Relation Age of Onset    Breast Cancer Sister     Other (CABG) Father         x4    Other (esophogeal reflux) Mother     Other (hip replacement) Mother      Social History     Tobacco Use    Smoking status: Former     Packs/day: 1.00     Years: 40.00     Additional pack years: 0.00     Total pack years: 40.00     Types: Cigarettes     Start date: 1968     Quit date: 2011     Years since quittin.7    Smokeless tobacco: Never   Substance Use Topics    Alcohol use: No     Comment: Quit in . Prior had been a heavier drinker.       SYSTEM Check if Review is Normal Check if Physical Exam is Normal If not normal, please explain:   HEENT [X] [X]    NECK & BACK [ ] [X] See HPI   HEART [X] [X]    LUNGS [X] [X]    ABDOMEN [X] [X]    UROGENITAL [ ] [ ] deferred   EXTREMITIES [X] [X]      Neurological Exam:  Mental status: Oriented to person, place, and time   Speech: Fluent, no dysarthria  CN: II-XII grossly intact  Memory and comprehension: Intact   Motor: No drift, no focal arm or leg weakness.  Sensory: Intact to light touch    The above referenced H&P was reviewed by Dilcia Pressley NP on 2024.  The patient was examined and no significant changes have occurred in the patient's condition since the H&P was performed.  Dr. Spicer reviewed with the patient and/or  legal representative the potential benefits, risks and side effects of this procedure; the likelihood of the patient achieving goals; and potential problems that might occur during recuperation. Dr. Spicer reviewed reasonable alternatives to the procedure, including risks, benefits and side effects related to the alternatives and risks related to not receiving this procedure. We will proceed with procedure as planned.       Dilcia Pressley, ELVIN  1/29/2024, 10:19 AM  [facility] [id number]

## 2024-01-29 NOTE — ANESTHESIA PREPROCEDURE EVALUATION
PRE-OP EVALUATION    Patient Name: Dev Johnston    Admit Diagnosis: Compression fracture of L1 vertebra, initial encounter ([facility]) [S32.010A]  Compression fracture of L5 lumbar vertebra, closed, initial encounter ([facility]) [S32.050A]    Pre-op Diagnosis: Compression fracture of L1 vertebra, initial encounter ([facility]) [S32.010A]  Compression fracture of L5 lumbar vertebra, closed, initial encounter ([facility]) [S32.050A]    LUMBAR 3 AND LUMBAR 4 LAMINECTOMIES WITH MEDIAL FACETECTOMIES AND ALL INDICATED PROCEDURES    Anesthesia Procedure: LUMBAR 3 AND LUMBAR 4 LAMINECTOMIES WITH MEDIAL FACETECTOMIES AND ALL INDICATED PROCEDURES (Spine Lumbar)    Surgeon(s) and Role:     * Salvador Spicer MD - Primary    Pre-op vitals reviewed.  Temp: 97.9 °F (36.6 °C)  Pulse: 54  Resp: 16  BP: 140/65  SpO2: 95 %  Body mass index is 21.56 kg/m².    Current medications reviewed.  Hospital Medications:   acetaminophen (Tylenol Extra Strength) tab 1,000 mg  1,000 mg Oral Once    lactated ringers infusion   Intravenous Continuous    ceFAZolin (Ancef) 2 g in 20mL IV syringe premix  2 g Intravenous Once    [COMPLETED] ertapenem (Invanz) 1 g in sodium chloride 0.9% 100 mL IVPB-MBP  1 g Intravenous Once    [COMPLETED] meropenem (Merrem) 1 g in sodium chloride 0.9% 100 mL IVPB-MBP  1 g Intravenous Once       Outpatient Medications:     Medications Prior to Admission   Medication Sig Dispense Refill Last Dose    ipratropium (ATROVENT HFA) 17 MCG/ACT Inhalation Aero Soln Inhale 2 puffs into the lungs 3 (three) times daily. 1 each 2 1/29/2024 at 0500    silodosin (RAPAFLO) 8 MG Oral Cap Take 1 capsule (8 mg total) by mouth every evening. 90 capsule 5 1/28/2024 at 1900    finasteride 5 MG Oral Tab Take 1 tablet (5 mg total) by mouth daily. 90 tablet 6 1/29/2024 at 0500    sertraline 50 MG Oral Tab Take 1 tablet (50 mg total) by mouth daily. 90 tablet 1 1/28/2024 at 0800    Meloxicam 15 MG Oral Tab Take 1 tablet (15 mg total) by mouth daily. 90  tablet 3 1/22/2024    metoprolol succinate ER 25 MG Oral Tablet 24 Hr Take 1 tablet (25 mg total) by mouth daily. 90 tablet 3 1/29/2024 at 0500    Sildenafil Citrate 100 MG Oral Tab Take 1 tablet (100 mg total) by mouth as needed for Erectile Dysfunction. 4 tablet 11     simvastatin 20 MG Oral Tab TAKE 1 TABLET BY MOUTH EVERY NIGHT AT BEDTIME 90 tablet 3 1/28/2024 at 1900    losartan 50 MG Oral Tab Take 1 tablet (50 mg total) by mouth daily. 90 tablet 3 1/29/2024 at 0500    Omeprazole Magnesium 20 MG Oral Tab EC Take 1 tablet (20 mg total) by mouth daily. 90 tablet 3 1/29/2024 at 0500    Aspirin 81 MG Oral Tab Take 1 tablet (81 mg total) by mouth daily.   1/22/2024       Allergies: Patient has no known allergies.      Anesthesia Evaluation    Patient summary reviewed.    Anesthetic Complications  (-) history of anesthetic complications         GI/Hepatic/Renal      (+) GERD                           Cardiovascular        Exercise tolerance: good     MET: >4      (+) hypertension     (+) CAD    (+) CABG/stent                            Endo/Other    Negative endo/other ROS.                              Pulmonary        (+) COPD                   Neuro/Psych                 (+) neuromuscular disease (lumbar radiculopathy)                     Past Surgical History:   Procedure Laterality Date    ANGIOPLASTY (CORONARY)  05/2011    APPENDECTOMY  1949    CATARACT Bilateral 11/2021    cataract removed    COLONOSCOPY  11/2012    + polyps    INSERT INTRACORONARY STENT      OTHER SURGICAL HISTORY  02/02/2022    Cystoscopy Dr. Olmos     TONSILLECTOMY      UPPER GI ENDOSCOPY - REFERRAL  03/22/2012    esophagitis, repeat in 5 year     Social History     Socioeconomic History    Marital status: Life Partner   Tobacco Use    Smoking status: Former     Packs/day: 1.00     Years: 40.00     Additional pack years: 0.00     Total pack years: 40.00     Types: Cigarettes     Start date: 1/7/1968     Quit date: 5/7/2011     Years since  quittin.7    Smokeless tobacco: Never   Vaping Use    Vaping Use: Never used   Substance and Sexual Activity    Alcohol use: No     Comment: Quit in . Prior had been a heavier drinker.    Drug use: Not Currently     Types: Cannabis   Other Topics Concern    Caffeine Concern Yes    Exercise Yes     Comment: 6x/week     History   Drug Use Unknown     Available pre-op labs reviewed.  Lab Results   Component Value Date    WBC 3.8 (L) 2024    RBC 3.73 (L) 2024    HGB 12.1 (L) 2024    HCT 37.3 (L) 2024    .0 2024    MCH 32.4 2024    MCHC 32.4 2024    RDW 12.9 2024    .0 2024     Lab Results   Component Value Date     2024    K 5.0 2024     2024    CO2 24.0 2024    BUN 40 (H) 2024    CREATSERUM 1.48 (H) 2024     (H) 2024    CA 9.3 2024     Lab Results   Component Value Date    INR 1.07 2024         Airway      Mallampati: II  Mouth opening: >3 FB  TM distance: 4 - 6 cm  Neck ROM: full Cardiovascular    Cardiovascular exam normal.         Dental             Pulmonary    Pulmonary exam normal.                 Other findings              ASA: 3   Plan: general  NPO status verified and patient meets guidelines.          Plan/risks discussed with: patient                Present on Admission:  **None**

## 2024-01-29 NOTE — OPERATIVE REPORT
Operative Note    Patient Name: Dev Johnston    Preoperative Diagnosis: Lumbar stenosis with neurogenic claudication      Postoperative Diagnosis: same    Primary Surgeon: Salvador Spicer MD    Assistant: ELVIN Lizarraga, was essentially case including opening, closing and retraction    Procedures: Lumbar 3 and lumbar 4 laminectomies with medial facetectomies    Surgical Findings: Severe stenosis L3-4 and 4 5    Anesthesia: General    Complications: none    Implants: DuraGen onlay    Specimen: None    Drains: None    Condition: Stable    Estimated Blood Loss: 20 mL    Indication for Procedure: 78-year-old gentleman with history of multiple lumbar fractures.  Patient developed lumbar stenosis as well.  Patient had low back pain with leg pain.  Patient was seen in clinic.  After discussion patient was plan for surgical decompression.    Procedure: Patient prepped identified and brought back to the OR 15.  Patient placed under general esthesia by anesthesia staff.  Patient placed on the OR table, position and all pressure points padded.  Fluoroscopy was shot to confirm our levels.  Patient was sterilely prepped and draped in usual fashion.  10 mL of cortisone Marcaine with epinephrine was given as local anesthetic.  Incision made dissection down to fascia.  A subperiosteal dissection was then performed along L 3 and 4 bilaterally.  Deep retractors were placed.  Fluoroscopy again confirmed her levels.  The spinous process of L3 and L4 were removed with a rongeur's.  The inferior lamina of L4 was dealing with a curette.  We then proceeded to laminectomies using Kerrisons and curettes.  Once was done the dura was nicely decompressed to the midline.  We did underbite L5 a little bit to get past our compression site.  Of note there is  very thinned out dura at L4-5.  No CSF leak was seen.  We did a medial facetectomies.  Once this was completed lateral gutters were nicely decompressed.  There was nice and  pulsatile.  A DuraGen onlay was then placed over the thinned out dura as a precaution.  Tisseel was placed over that.  No CSF was ever seen.  Hemostasis was achieved.  The wound was then closed in layers.  Patient was taken off the OR table.  Patient waken anesthesia taken recovery    Dictated but not proofread

## 2024-01-29 NOTE — ANESTHESIA POSTPROCEDURE EVALUATION
[facility]    Dev Johnston Patient Status:  Outpatient in a Bed   Age/Gender 78 year old male MRN [id number]   Location [facility] SURGERY Attending Salvador Spicer MD   Hosp Day # 0 PCP Fadi Jarrell MD       Anesthesia Post-op Note    LUMBAR 3 AND LUMBAR 4 LAMINECTOMIES WITH MEDIAL FACETECTOMIES AND ALL INDICATED PROCEDURES    Procedure Summary       Date: 01/29/24 Room / Location:  MAIN OR 15 / [facility] MAIN OR    Anesthesia Start: 1052 Anesthesia Stop: 1256    Procedure: LUMBAR 3 AND LUMBAR 4 LAMINECTOMIES WITH MEDIAL FACETECTOMIES AND ALL INDICATED PROCEDURES (Spine Lumbar) Diagnosis:       Compression fracture of L1 vertebra, initial encounter ([facility])      Compression fracture of L5 lumbar vertebra, closed, initial encounter ([facility])      (Compression fracture of L1 vertebra, initial encounter ([facility]) [S32.010A]Compression fracture of L5 lumbar vertebra, closed, initial encounter ([facility]) [S32.050A])    Surgeons: Salvador Spicer MD Anesthesiologist: Tim Simms MD    Anesthesia Type: general ASA Status: 3            Anesthesia Type: general    Vitals Value Taken Time   /68 01/29/24 1256   Temp 97.5 °F (36.4 °C) 01/29/24 1256   Pulse 74 01/29/24 1256   Resp 20 01/29/24 1256   SpO2 98 % 01/29/24 1256       Patient Location: PACU    Anesthesia Type: general    Airway Patency: patent and extubated    Postop Pain Control: adequate    Mental Status: mildly sedated but able to meaningfully participate in the post-anesthesia evaluation    Nausea/Vomiting: none    Cardiopulmonary/Hydration status: stable euvolemic    Complications: no apparent anesthesia related complications    Postop vital signs: stable    Dental Exam: Unchanged from Preop    Patient to be discharged from PACU when criteria met.

## 2024-01-29 NOTE — PLAN OF CARE
A&Ox4. VSS. On 2L via nasal cannula. . IS encouraged. Telemetry monitoring - SNB. SCDs on BLE. Ankle pumps encouraged. Tolerating regular diet. Last BM 1/28. DTV by 2030. Pain controlled. Incision intact. Plan is to work with PT/OT. Patient updated on plan of care. Safety precautions in place. Call light within reach.

## 2024-01-29 NOTE — CONSULTS
[facility]  CONSULT     Dev Johnston Patient Status:  Outpatient in a Bed    1945 MRN [id number]   Location [facility] 3SW-A Attending Salvador Spicer MD   Hosp Day # 0 PCP Fadi Jarrell MD     Reason for consult: Medical management    Requested by: Dr. Salvador Sebastian    Subjective:   History of Present Illness:     Dev Johnston is a 78 year old male with past medical history essential hypertension, GERD, hyperlipidemia, emphysema, BPH presents to the hospital for scheduled back surgery with Dr. Sebastian. Patient has been having issues of low back pain with radiculopathy.  Patient failed outpatient conservative measures and presents today for surgery.  Patient underwent L3/L4 laminectomies with medial facetectomies and tolerated procedure well.  Estimated blood loss 20 cc.  Postoperatively patient denies any chest pain, short breath, fevers, chills, nausea, vomiting.  Patient has a surgical site pain that is better with pain medication.  History/Other:    Past Medical History:  Past Medical History:   Diagnosis Date    Adverse drug reaction 12/10/2014    Age-related cataract of both eyes 2021    Arthritis 2010    Asthma     Back pain 2010    Back problem     Benign neoplasm of colon     BPH (benign prostatic hyperplasia) 2013    Calculus of kidney ?    COPD (chronic obstructive pulmonary disease) ([facility])     NO HOME O2    Coronary atherosclerosis     Diverticulosis of colon (without mention of hemorrhage)     Easy bruising 2010    Esophageal reflux     Flatulence/gas pain/belching occasional    Frequent urination 2010    Frequent use of laxatives occasional    Heartburn ?    Hemorrhoids ?    High blood pressure     High cholesterol     History of cardiac murmur ?    Hx of heart artery stent     Prostate nodule 2016    Had biopsy 2016    Stented coronary artery ?    one    Unspecified hemorrhoids without mention of complication 2012    Visual  impairment     READING GLASSES    Wears glasses 2020 Wheezing 2010     Past Surgical History:   Past Surgical History:   Procedure Laterality Date    ANGIOPLASTY (CORONARY)  05/2011    APPENDECTOMY  1949    CATARACT Bilateral 11/2021    cataract removed    COLONOSCOPY  11/2012    + polyps    INSERT INTRACORONARY STENT      OTHER SURGICAL HISTORY  02/02/2022    Cystoscopy Dr. Olmos     TONSILLECTOMY      UPPER GI ENDOSCOPY - REFERRAL  03/22/2012    esophagitis, repeat in 5 year      Family History:   Family History   Problem Relation Age of Onset    Breast Cancer Sister     Other (CABG) Father         x4    Other (esophogeal reflux) Mother     Other (hip replacement) Mother      Social History:    reports that he quit smoking about 12 years ago. His smoking use included cigarettes. He started smoking about 56 years ago. He has a 40 pack-year smoking history. He has never used smokeless tobacco. He reports that he does not currently use drugs after having used the following drugs: Cannabis. He reports that he does not drink alcohol.     Allergies: No Known Allergies    Medications:    No current facility-administered medications on file prior to encounter.     Current Outpatient Medications on File Prior to Encounter   Medication Sig Dispense Refill    sertraline 50 MG Oral Tab Take 1 tablet (50 mg total) by mouth daily. 90 tablet 1    Meloxicam 15 MG Oral Tab Take 1 tablet (15 mg total) by mouth daily. 90 tablet 3    metoprolol succinate ER 25 MG Oral Tablet 24 Hr Take 1 tablet (25 mg total) by mouth daily. 90 tablet 3    Sildenafil Citrate 100 MG Oral Tab Take 1 tablet (100 mg total) by mouth as needed for Erectile Dysfunction. 4 tablet 11    simvastatin 20 MG Oral Tab TAKE 1 TABLET BY MOUTH EVERY NIGHT AT BEDTIME 90 tablet 3    losartan 50 MG Oral Tab Take 1 tablet (50 mg total) by mouth daily. 90 tablet 3    Omeprazole Magnesium 20 MG Oral Tab EC Take 1 tablet (20 mg total) by mouth daily. 90 tablet 3     Aspirin 81 MG Oral Tab Take 1 tablet (81 mg total) by mouth daily.         Review of Systems:   A comprehensive review of systems was completed.    Pertinent positives and negatives noted in the HPI.    Objective:   Physical Exam:    /54 (BP Location: Left arm)   Pulse 57   Temp 97.7 °F (36.5 °C) (Oral)   Resp 16   Ht 6' (1.829 m)   Wt 159 lb (72.1 kg)   SpO2 94%   BMI 21.56 kg/m²   General: No acute distress, Alert  Respiratory: No rhonchi, no wheezes  Cardiovascular: S1, S2. Regular rate and rhythm  Abdomen: Soft, NT/ND, +BS  Neuro: No new focal deficits  Extremities: No edema      Results:    Labs:      Labs Last 24 Hours:  No results for input(s): \"WBC\", \"HGB\", \"MCV\", \"PLT\", \"BAND\", \"INR\" in the last 168 hours.    Invalid input(s): \"LYM#\", \"MONO#\", \"BASOS#\", \"EOSIN#\"    No results for input(s): \"GLU\", \"BUN\", \"CREATSERUM\", \"GFRAA\", \"GFRNAA\", \"CA\", \"ALB\", \"NA\", \"K\", \"CL\", \"CO2\", \"ALKPHO\", \"AST\", \"ALT\", \"BILT\", \"TP\" in the last 168 hours.    No results for input(s): \"PTP\", \"INR\" in the last 168 hours.    No results for input(s): \"TROP\", \"CK\" in the last 168 hours.      Imaging: Imaging data reviewed in Epic.    Assessment & Plan:        #Severe stenosis L3-4 and L4-5  -S/p L3/L4 laminectomies with medial facetectomies  -Pain control  -Therapies to see    #Essential hypertension  -PTA: Metoprolol, losartan  -Blood pressure medications continue with holding parameters    #GERD  -PTA: Omeprazole    #Hyperlipidemia  -PTA: Simvastatin    #Emphysema  -As needed albuterol    #BPH  -PTA: Silodosin, finasteride          Plan of care discussed with patient and staff    Magdalena Alvarez DO  1/29/2024    The 21st Century Cures Act makes medical notes like these available to patients in the interest of transparency. Please be advised this is a medical document. Medical documents are intended to carry relevant information, facts as evident, and the clinical opinion of the practitioner. The medical note is intended as  peer to peer communication and may appear blunt or direct. It is written in medical language and may contain abbreviations or verbiage that are unfamiliar.

## 2024-01-29 NOTE — BRIEF OP NOTE
Pre-Operative Diagnosis: Compression fracture of L1 vertebra, initial encounter ([facility]) [S32.010A]  Compression fracture of L5 lumbar vertebra, closed, initial encounter ([facility]) [S32.050A]     Post-Operative Diagnosis: Compression fracture of L1 vertebra, initial encounter ([facility]) [S32.010A]Compression fracture of L5 lumbar vertebra, closed, initial encounter ([facility]) [S32.050A]      Procedure Performed:   LUMBAR 3 AND LUMBAR 4 LAMINECTOMIES WITH MEDIAL FACETECTOMIES AND ALL INDICATED PROCEDURES    Surgeon(s) and Role:     * Salvador Spicer MD - Primary    Assistant(s):  APN: Dilcia Pressley APN     Surgical Findings: See dictation     Specimen: None     Estimated Blood Loss: Blood Output: 20 mL (1/29/2024 12:23 PM)          Salvador Spicer MD  1/29/2024  12:54 PM

## 2024-01-29 NOTE — ANESTHESIA PROCEDURE NOTES
Airway  Date/Time: 1/29/2024 10:59 AM  Urgency: elective      General Information and Staff    Patient location during procedure: OR  Anesthesiologist: Tim Simms MD  Performed: anesthesiologist   Performed by: Tim Simms MD  Authorized by: Tim Simms MD      Indications and Patient Condition  Indications for airway management: anesthesia  Sedation level: deep  Preoxygenated: yes  Patient position: sniffing  Mask difficulty assessment: 1 - vent by mask    Final Airway Details  Final airway type: endotracheal airway      Successful airway: ETT  Cuffed: yes   Successful intubation technique: direct laryngoscopy  Endotracheal tube insertion site: oral  Blade: Sherri  Blade size: #3  ETT size (mm): 7.5    Cormack-Lehane Classification: grade I - full view of glottis  Placement verified by: capnometry   Measured from: lips  ETT to lips (cm): 22  Number of attempts at approach: 1

## 2024-01-30 VITALS
OXYGEN SATURATION: 93 % | DIASTOLIC BLOOD PRESSURE: 63 MMHG | HEIGHT: 72 IN | HEART RATE: 71 BPM | BODY MASS INDEX: 21.54 KG/M2 | SYSTOLIC BLOOD PRESSURE: 114 MMHG | TEMPERATURE: 97 F | RESPIRATION RATE: 17 BRPM | WEIGHT: 159 LBS

## 2024-01-30 LAB
HCT VFR BLD AUTO: 33.8 %
HGB BLD-MCNC: 11.1 G/DL

## 2024-01-30 PROCEDURE — 99214 OFFICE O/P EST MOD 30 MIN: CPT | Performed by: HOSPITALIST

## 2024-01-30 RX ORDER — TRAMADOL HYDROCHLORIDE 50 MG/1
TABLET ORAL EVERY 6 HOURS PRN
Qty: 60 TABLET | Refills: 0 | Status: SHIPPED | OUTPATIENT
Start: 2024-01-30

## 2024-01-30 RX ORDER — DIAZEPAM 2 MG/1
2 TABLET ORAL EVERY 6 HOURS PRN
Qty: 30 TABLET | Refills: 0 | Status: SHIPPED | OUTPATIENT
Start: 2024-01-30

## 2024-01-30 NOTE — DISCHARGE INSTRUCTIONS
Lumbar Microdiscectomy/Laminectomy Discharge Instructions  (Dr. Spicer)    Activity  Restrictions  No twisting, pulling, pushing or lifting > 10 lbs.  No lifting anything over your head.  No bending at the waist  - you can bend at the knees but keep your back straight.    Sitting  Sit with your knees at about the same level as your hips; use a footstool if needed.  Do not sit in soft or overstuffed chairs. Firm chairs with straight backs give better support.   Recliners are OK but may need to add pillows in order to not sink into the chair.  Use a raised toilet seat if needed.  Change position every 20-30 minutes when sitting (example: after sitting, stand, then you can sit again for another 20-30 minutes).    Walking is your best exercise.  Listen to your body.  Walk several times a day  Smaller distances are better.  Your goal is to increase the distance you walk each day.  Remember to listen to your body    Sex  After 2 weeks, when comfortable and approved by your surgeon.  Stop if causing pain.    Driving  Usually allowed after  2-3 weeks;  check with physician at first office visit.  Do Not drive while taking narcotics or muscle relaxants.  Adhere to sitting restrictions.    Stairs  Climb stairs as needed    Incision site care and dressing  Changes as directed by your surgeon    IF DERMABOND (GLUE)  May leave open to air or apply and change dressing once a day using dry sterile gauze and paper tape. May prevent clothing from rubbing incision.   Watch incision for any redness, drainage, increased warmth or opening of the incision.   Call surgeon if you notice any of these.  No lotions or ointments on or near incision.     Always wash hands before and after dressing changes.                                Bathing  No tub baths, pools, or saunas for 6 weeks and until cleared by surgeon.  When able to shower, you may remove gauze dressing beforehand.  After showering, pat incision dry and may apply new dry  dressing.  Do not apply any lotions or ointments to incision.         Return to work  When cleared by surgeon. Discuss specific work activities with your surgeon at follow up visit.  Light to sedentary work may be possible 2-3 weeks post op  Heavy work may be possible 6 weeks post op.    Sleeping  Use a firm mattress.  Lay on side or back, not stomach.  May use pillow under knees, between legs or behind back if lying on your side.  Log roll to turn.    Diet and constipation prevention  Drink six to eight glasses liquid (water, juice) per day.  Eat a high fiber diet (bran, vegetables, fruit).  Use an over the counter stool softener such as Colace or senakot while taking narcotics to prevent constipation; use laxatives such as Miralax or Milk of Magnesia as needed.  An enema or suppository may be necessary if above measures do not work.        No smoking  Smoking will inhibit healing.  Even one cigarette a day will cause problems.  Chewing tobacco, nicotine gum or patches will also inhibit healing.      Pain Management  Relaxation techniques  A way to focus your attention other than on your pain. Your brain makes endorphins that are a natural body chemical that can decrease pain. Some examples of relaxation techniques are deep breathing, listening to music or meditating.  May use Cold therapy for 20 minutes multiple times per day.  Be sure there is a cloth barrier between skin and cold therapy.  Medication  No NSAIDS until OK with your surgeon.   NSAIDS (non-steroidal anti- inflammatory) include: Motrin, ibuprofen, Advil, Aleve, Naprosyn, Indocin, Nuprin, Vioxx, Celebrex, Bextra.(COMPLETE LIST IN GUIDEBOOK APPENDIX)  Tylenol (acetaminophen) is okay. Caution if your pain med contains Tylenol (acetaminophen); maximum 3 gms of Tylenol (acetaminophen) in 24 hours.  A single aspirin for the heart is ok if ordered by your physician.  Take muscle relaxants and pain medications as prescribed allowing 30-45 minutes to take  effect.  Do NOT take alcohol while on pain medication.  Monitor need for pain medication closely  Call pharmacy or physician office at least five days before out of pain medication. If calling physician office after 3 pm, it will be handled on the next business day.    Post op office visit  Schedule 2 weeks after surgery with surgeon as directed at discharge  Schedule one week follow up with Primary Care Physician. Review all medications.      When to contact your surgeon  Temp > 101F; Take your temperature twice a day  Increased pain, swelling, redness, or any drainage to incision.  Separation of incision.  Sudden reappearance of pain that won’t go away with pain medication.  New numbness or weakness to arms or legs.  Difficulty urinating or having bowel movements  Headaches that worsen when standing and resolve when laying flat.    Go directly to the ER or CALL 911 if you:  become short of breath  have chest pain  cough up blood  have unexplained anxiety with breathing      Hold Aspirin and Meloxicam until POD #5 (2/3)

## 2024-01-30 NOTE — PLAN OF CARE
Pt A/Ox4. VSS, on RA, instructed on IS use and ankle exercises. Surg pain to lower back mild, declined need for prn pain med. States pre-op back pain improved, denies n/t to ble, strong dorsi/plantar flexion both ankles. Pt urinates frequently with few times in small amt, states feels like he's unable to empty his bladder. PVR done, result 644ml. Straight cath done with 650ml clear yelloe urine obtained. PT/OT erin in AM, plan for possible dc home today.

## 2024-01-30 NOTE — PROGRESS NOTES
A&Ox4. VSS. On room air. . IS encouraged. Telemetry monitoring - NSR. SCDs on BLE. Ankle pumps encouraged. Tolerating regular diet. Last BM 1/28. Voiding freely. Pain controlled. Incision intact. Plan is to work with PT/OT. Patient updated on plan of care. Safety precautions in place. Call light within reach.

## 2024-01-30 NOTE — PHYSICAL THERAPY NOTE
PHYSICAL THERAPY EVALUATION - INPATIENT     Room Number: 383/383-A  Evaluation Date: 1/30/2024  Type of Evaluation: Initial  Physician Order: PT Eval and Treat    Presenting Problem: s/p L3-L4 laminectomy with medial facetectomies  Co-Morbidities : essential HTN, GERD, HLD, emphysema, BPH  Reason for Therapy: Mobility Dysfunction and Discharge Planning    PROCEDURE 1/29/24 Dr. Spicer: LUMBAR 3 AND LUMBAR 4 LAMINECTOMIES WITH MEDIAL FACETECTOMIES AND ALL INDICATED PROCEDURES    ASSESSMENT   Pt is a 78 year old male admitted on 1/29/2024 for L3-L4 laminectomy with medial facetectomies. Functional outcome measures completed include [facility].  The AM-PAC '6-Clicks' Inpatient Basic Mobility Short Form was completed and this patient is demonstrating a Approx Degree of Impairment: 46.58%  degree of impairment in mobility. Research supports that patients with this level of impairment may benefit from HOME.  PT Discharge Recommendations: Home      PLAN  Patient has been evaluated and presents with no skilled Physical Therapy needs at this time.  Patient discharged from Physical Therapy services.  Please re-order if a new functional limitation presents during this admission.    GOALS  Patient was able to achieve the following goals ...    Patient was able to transfer At previous, functional level  Safely and independently   Patient able to ambulate on level surfaces At previous, functional level  Safely and independently     HOME SITUATION  Type of Home: House   Home Layout: Multi-level;Bed/bath upstairs  Stairs to Enter : 2  Railing: No  Stairs to Bedroom: 13  Railing: Yes    Lives With: Significant other  Drives: Yes  Patient Owned Equipment: None  Patient Regularly Uses: Reading glasses    Prior Level of [place]: Per pt typically independent with all aspects of mobility. Lives in two story home with life partner. No use of device. One fall in last six months when he was squatting down at the garage door. Drives.  Enjoys physics.    SUBJECTIVE  \"It just feels stiff.\"      OBJECTIVE  Precautions: Spine;Bed/chair alarm  Fall Risk: Standard fall risk    WEIGHT BEARING RESTRICTION  Weight Bearing Restriction: None                PAIN ASSESSMENT  Ratin  Location: lumbar spine  Management Techniques: Activity promotion;Body mechanics;Repositioning    COGNITION  Overall Cognitive Status:  WFL - within functional limits    RANGE OF MOTION AND STRENGTH ASSESSMENT  Upper extremity ROM and strength are within functional limits   Lower extremity ROM is within functional limits   Lower extremity strength is within functional limits       BALANCE  Static Sitting: Good  Dynamic Sitting: Fair +  Static Standing: Fair  Dynamic Standing: Fair -    ADDITIONAL TESTS                                    ACTIVITY TOLERANCE                         O2 WALK       NEUROLOGICAL FINDINGS                        AM-PAC '6-Clicks' INPATIENT SHORT FORM - BASIC MOBILITY  How much difficulty does the patient currently have...  Patient Difficulty: Turning over in bed (including adjusting bedclothes, sheets and blankets)?: A Little   Patient Difficulty: Sitting down on and standing up from a chair with arms (e.g., wheelchair, bedside commode, etc.): A Little   Patient Difficulty: Moving from lying on back to sitting on the side of the bed?: A Little   How much help from another person does the patient currently need...   Help from Another: Moving to and from a bed to a chair (including a wheelchair)?: A Little   Help from Another: Need to walk in hospital room?: A Little   Help from Another: Climbing 3-5 steps with a railing?: A Little       AM-PAC Score:  Raw Score: 18   Approx Degree of Impairment: 46.58%   Standardized Score (AM-PAC Scale): 43.63   CMS Modifier (G-Code): CK    FUNCTIONAL ABILITY STATUS  Gait Assessment   Functional Mobility/Gait Assessment  Gait Assistance: Supervision  Distance (ft): 100, 250  Assistive Device: Rolling  walker;None  Pattern: Within Functional Limits  Stairs: Stairs;Car transfer  How Many Stairs: 4  Device: 1 Rail  Assist: Supervision  Pattern: Ascend and Descend  Ascend and Descend : Step to  Car transfer: Rosario v/c for sequencing to maintain spine precautions    Skilled Therapy Provided     Bed Mobility:  Rolling: educated on log roll technique- able to demonstrate back with v/c for sequencing  Supine to sit: supervision   Sit to supine: NT     Transfer Mobility:  Sit to stand: supervision to RW- v/c for hand placement   Stand to sit: supervision  Gait = supervision w/ RW x 100 feet. Supervision w/o device x 250 feet. Normal gait speed. No gait deviations. Steady balance.     Therapist's comments:   Patient presents sitting up in bed. Life partner present in room. Discussed role and goal of physical therapy. Pt in agreement to session. Pt reporting 5/10 pain at this time. Educated on use of log roll technique for bed mobility. Educated on spine precautions: no bending, lifting, twisting. Pt verbalizes understanding.     Bed mobility completed at supervision, able to demonstrate log roll technique with verbal cues. Sit to stand transfer at supervision to RW. Verbal cues required for hand placement. Pt ambulated 100 feet with RW. Ascended/descended 4 steps with use of 1 rail at supervision. Car transfer Rosario with v/c for sequencing to maintain spine precautions. Ambulated 250 feet w/o device at supervision. Pt upright in bedside chair at end of session.     Discussed importance of continued out of bed functional mobility. Encouraged pt to ambulate with RN/PCT staff 4 times daily per spine surgeons recommendations. Pt verbalizes understanding. Pt with no further IP PT needs at this time. If change in functional mobility status occurs please re-order therapy services. Recommendation to home at this time. RN and SW aware.    Exercise/Education Provided:  Bed mobility  Body mechanics  Energy conservation  Functional  activity tolerated  Gait training  Posture  Transfer training    Patient End of Session: Up in chair;Needs met;Call light within reach;RN aware of session/findings;All patient questions and concerns addressed;Family present;Discussed recommendations with /    Patient Evaluation Complexity Level:  History Moderate - 1 or 2 personal factors and/or co-morbidities   Examination of body systems Low - addressing 1-2 elements   Clinical Presentation Low - Stable   Clinical Decision Making Low Complexity     PT Session Time: 30 minutes  Gait Training: 15 minutes

## 2024-01-30 NOTE — PLAN OF CARE
Patient discharged home via family transport.  Discharge education taught, patient verbalizes understanding.  Belongings sent with patient.  Meds to Beds delivered, sent with patient.

## 2024-01-30 NOTE — OCCUPATIONAL THERAPY NOTE
OCCUPATIONAL THERAPY EVALUATION - INPATIENT    Room Number: 383/383-A  Evaluation Date: 1/30/2024     Type of Evaluation: Initial  Presenting Problem: 1/29  Lumbar 3 and lumbar 4 laminectomies with medial facetectomies    Physician Order: IP Consult to Occupational Therapy  Reason for Therapy:  ADL/IADL Dysfunction and Discharge Planning    History: Patient is a 78 year old male admitted on 1/29/2024 with Presenting Problem: 1/29  Lumbar 3 and lumbar 4 laminectomies with medial facetectomies.  Co-Morbidities : essential HTN, GERD, HLD, emphysema, BPH    Surgery 1/29  Preoperative Diagnosis: Lumbar stenosis with neurogenic claudication  Postoperative Diagnosis: same  Procedures: Lumbar 3 and lumbar 4 laminectomies with medial facetectomies       ASSESSMENT   Patient met all OT goals at Modified independent   level. Patient reports no further questions/concerns at this time.     The AM-PAC ' '6-Clicks' Inpatient Daily Activity Short Form was completed and this patient is demonstrating an Approx Degree of Impairment: 0% in activities of daily living. Research supports that patients with this level of impairment often benefit from discharge home.       OT Discharge Recommendations: Home  OT Device Recommendations: None    WEIGHT BEARING RESTRICTION  Weight Bearing Restriction: None                Recommendations for nursing staff:   Transfers: independent  Toileting location: Toilet    EVALUATION SESSION:  Patient at start of session: supine  FUNCTIONAL TRANSFER ASSESSMENT  Sit to Stand: Edge of Bed  Edge of Bed: Independent  Toilet Transfer: Independent    BED MOBILITY  Supine to Sit : Modified Independent    COGNITION  Overall Cognitive Status:  WFL - within functional limits  COGNITION ASSESSMENTS       Upper Extremity:   ROM: within functional limits   Strength: is within functional limits       EDUCATION PROVIDED  Patient : Plan of Care; Role of Occupational Therapy; Functional Transfer Techniques;  Posture/Positioning; Compensatory ADL Techniques; Proper Body Mechanics  Patient's Response to Education: Returned Demonstration; Verbalized Understanding    Equipment used: none      Therapist comments: see [facility] ADL section below for details (in blue section)      Patient End of Session: Up in chair;Needs met;Call light within reach;RN aware of session/findings;All patient questions and concerns addressed    OCCUPATIONAL PROFILE    HOME SITUATION  Type of Home: House  Home Layout: Multi-level;Bed/bath upstairs  Lives With: Significant other    Toilet and Equipment: Standard height toilet  Shower/Tub and Equipment: Tub-shower combo  Other Equipment: Long-handled shoehorn;Reacher    Occupation/Status: retired  Hand Dominance: Right;Left  Drives: Yes  Patient Regularly Uses: Reading glasses    Prior Level of Function: independent with ADL and IADL. Per pt, back pain for 3 yrs.       PAIN ASSESSMENT  Rating: 3  Location: low back  Management Techniques: Activity promotion    OBJECTIVE  Precautions: Spine;Bed/chair alarm  Fall Risk: Standard fall risk    WEIGHT BEARING RESTRICTION  Weight Bearing Restriction: None                AM-PAC ‘6-Clicks’ Inpatient Daily Activity Short Form  -   Putting on and taking off regular lower body clothing?: None (educated the pt about spine precautions, LB dressing sequencing, and body mechanics. Independent guiding pants over legs, donning socks, donning shoes, and standing without device.)  -   Bathing (including washing, rinsing, drying)?: None  -   Toileting, which includes using toilet, bedpan or urinal? : None (independent clothing management)  -   Putting on and taking off regular upper body clothing?: None (independent)  -   Taking care of personal grooming such as brushing teeth?: None  -   Eating meals?: None    AM-PAC Score:  Score: 24  Approx Degree of Impairment: 0%  Standardized Score (AM-PAC Scale): 57.54      ADDITIONAL TESTS     NEUROLOGICAL FINDINGS        PLAN    Patient has been evaluated and presents with no skilled Occupational Therapy needs at this time.  Patient discharged from Occupational Therapy services.  Please re-order if a new functional limitation presents during this admission.      Patient Evaluation Complexity Level:   Occupational Profile/Medical History LOW - Brief history including review of medical or therapy records    Specific performance deficits impacting engagement in ADL/IADL LOW  1 - 3 performance deficits    Client Assessment/Performance Deficits MODERATE - Comorbidities and min to mod modifications of tasks    Clinical Decision Making LOW - Analysis of occupational profile, problem-focused assessments, limited treatment options    Overall Complexity LOW     OT Session Time: 20 minutes  Self-Care Home Management: 12 minutes  Therapeutic Activity: 5 minutes

## 2024-01-30 NOTE — PROGRESS NOTES
[facility]  Neurosurgery Progress Note    Dev Johnston Patient Status:  Outpatient in a Bed    1945 MRN [id number]   Location [facility] 3SW-A Attending Salvador Spicer MD   Hosp Day # 0 PCP Fadi Jarrell MD     Chief Complaint:  No chief complaint on file.      Subjective:  Pt examined, reports improvement in preop pain.  He reports expected post op pain.  Pt feels ready for dc    Objective/Physical Exam:    Vital Signs:  Blood pressure 127/55, pulse 60, temperature 98.3 °F (36.8 °C), temperature source Oral, resp. rate 16, height 72\", weight 159 lb (72.1 kg), SpO2 92%.    Respiratory:  Respirations nonlabored    CV:  NSR on tele    General: NAD, Speech Fluent, Mood Appropriate    Neurologic: This patient is alert and orientated x 3.  Able to follow commands.  Face is symmetric.  CN 2-12 GI,  Sensation to light touch is intact bilaterally.  LIGHT x 4.  Gait deferred    Skin: Surgical incision approximated with glue                                                                                                                                                                                        Labs:  Lab Results   Component Value Date    HGB 11.1 2024    HCT 33.8 2024       Imaging:  No new imaging to review    Assessment:  S/p L3, 4 laminectomies with medial facectomies POD #1    Plan:  Pt seen and examined with Dr. Spicer  Pain medications as ordered  PT/OT evals  Medical management per hospitalist  DVT prophylaxis SCDs TEDs  Ok to dc home once cleared by other services      ELVIN Lizarraga  [facility]  2024, 8:35 AM      Patient seen examined nurse practitioner  Case discussed  He is doing well  He says his back feels better  He is very happy  He would like to go home  Okay to discharge later today  Instructions given

## 2024-01-30 NOTE — PROGRESS NOTES
[facility]     Hospitalist Progress Note     Dev Johnston Patient Status:  Outpatient in a Bed    1945 MRN [id number]   Location [facility] 3SW-A Attending Salvador Spicer MD   Hosp Day # 0 PCP Fadi Jarrell MD     Chief Complaint: Lumbar stenosis    Subjective:   Patient denies pain. No chest pain or shortness of breath. No nausea, vomiting, diarrhea. Feels well. Up in chair.     Current medications:   sennosides  17.2 mg Oral Nightly    docusate sodium  100 mg Oral BID    acetaminophen  1,000 mg Oral Q8H ISABEL    albuterol  2 puff Inhalation QID    finasteride  5 mg Oral Daily    pantoprazole  20 mg Oral QAM AC    metoprolol succinate ER  25 mg Oral Daily Beta Blocker    tamsulosin  0.4 mg Oral QPM    sertraline  50 mg Oral Daily    atorvastatin  10 mg Oral Nightly    [Held by provider] losartan  50 mg Oral Daily       Objective:    Review of Systems:   10 point ROS completed and was negative, except for pertinent positive and negatives stated in subjective.    Vital signs:  Temp:  [97.4 °F (36.3 °C)-98.3 °F (36.8 °C)] 97.4 °F (36.3 °C)  Pulse:  [50-84] 71  Resp:  [7-20] 17  BP: (111-150)/(54-77) 114/63  SpO2:  [89 %-99 %] 93 %  Patient Weight for the past 72 hrs:   Weight   24 0919 159 lb (72.1 kg)     Physical Exam:    General: No acute distress.   Respiratory: Clear to auscultation bilaterally.   Cardiovascular: S1, S2. Regular rate and rhythm.   Abdomen: Soft, nontender, nondistended.  Positive bowel sounds.   Extremities: No edema.  Neuro: AAOx3    Diagnostic Data:    Labs:  Recent Labs   Lab 24  0526   HGB 11.1*       No results for input(s): \"GLU\", \"BUN\", \"CREATSERUM\", \"GFRAA\", \"GFRNAA\", \"CA\", \"ALB\", \"NA\", \"K\", \"CL\", \"CO2\", \"ALKPHO\", \"AST\", \"ALT\", \"BILT\", \"TP\" in the last 168 hours.    CrCl cannot be calculated (Patient's most recent lab result is older than the maximum 7 days allowed.).    No results for input(s): \"PTP\", \"INR\" in the last 168 hours.          COVID-19 Lab Results    COVID-19  Lab Results   Component Value Date    COVID19 Not Detected 09/11/2023    COVID19 Not Detected 11/21/2022    COVID19 NOT DETECTED 03/19/2022       Pro-Calcitonin  No results for input(s): \"PCT\" in the last 168 hours.    Cardiac  No results for input(s): \"TROP\", \"PBNP\" in the last 168 hours.    Creatinine Kinase  No results for input(s): \"CK\" in the last 168 hours.    Inflammatory Markers  No results for input(s): \"CRP\", \"JOSE\", \"LDH\", \"DDIMER\" in the last 168 hours.    No results for input(s): \"TROP\", \"TROPHS\", \"CK\" in the last 168 hours.    Imaging: Imaging data reviewed in Epic.    Medications:    sennosides  17.2 mg Oral Nightly    docusate sodium  100 mg Oral BID    acetaminophen  1,000 mg Oral Q8H ISABEL    albuterol  2 puff Inhalation QID    finasteride  5 mg Oral Daily    pantoprazole  20 mg Oral QAM AC    metoprolol succinate ER  25 mg Oral Daily Beta Blocker    tamsulosin  0.4 mg Oral QPM    sertraline  50 mg Oral Daily    atorvastatin  10 mg Oral Nightly    [Held by provider] losartan  50 mg Oral Daily       Assessment & Plan:    Lumbar stenosis with claudication sp L3-4 laminectomies with medial facetectomies 1/29  Pain control  PT/OT  NS following  CAD sp PCI  Essential hypertension  Dyslipidemia  Aspirin - hold, resume when cleared by NS  Toprol  Losartan - resume on DC  Lipitor   Monitor hemodynamic  Telemetry   COPD  Albuterol   GERD  PPI  BPH  Flomax  Proscar    Supplementary Documentation:   Quality:  DVT Prophylaxis: SCDs    At this point Mr. Johnston is expected to be discharge to: Home    Plan of care discussed with patient, family, RN and SW.    Johny Agosto MD

## 2024-01-31 LAB
BLOOD TYPE BARCODE: 5100
UNIT VOLUME: 350 ML

## 2024-02-05 ENCOUNTER — TELEPHONE (OUTPATIENT)
Facility: CLINIC | Age: 79
End: 2024-02-05

## 2024-02-05 NOTE — TELEPHONE ENCOUNTER
Kassandraeceived pt reminder notice       Per pt reminder note:    \"Pt scheduled for surgery on 1.29.24 with Dr. Spicer     Pt reminder placed to appear 3-5 days after sx     Initiate TE for pt outreach     If pt was prescribed a Bone Growth Stimulator, fax the Op Note to Javier from [facility] at Fax #: [phone] \"      Routed to RN pool     LUMBAR 3 AND LUMBAR 4 LAMINECTOMIES WITH MEDIAL FACETECTOMIES AND ALL INDICATED PROCEDURES

## 2024-02-05 NOTE — TELEPHONE ENCOUNTER
For Lumbar Sx:    Patient had LUMBAR 3 AND LUMBAR 4 LAMINECTOMIES WITH MEDIAL FACETECTOMIES AND ALL INDICATED PROCEDURES  on 1/29/2024 by Dr. Spicer   Post op day 7    Spoke to Adrienne, pt life partner, per HIPAA .    1)Asked how Skip is feeling in general he stated:  Seems to be doing much better and the fever subsided and having some pain in the back and is taking tylenol for pain only at this time.   Advised pt and pt Life Partner BLT restrictions. Adrienne acknowledged     2) Discussed Dressing with patient, informed them dressing can be removed on day 3,  Incision looks really good but the bottom  pf the incision site is bruised on the bottom of the incision site but is free from infection    3)Site check for redness, drainage, swelling, discoloration, fever, etc.  Patient states: pt life partner denies signs of infection at this time    4) Discussed Current Pain Level:  Pain currently 6/10- if he moves a certain way it goes up to 6 but otherwise no pain     If patient is struggling with pain control, discuss recommendations to help with pain  Advised pt and Adrienne of Ice and heat. May use at 15 min intervals, not directly on the skin or incision site. Allow a cloth barrier between the skin and Ice/heat therapy. Pt and Adrienne acknowledged and is appreciative of information    5) Discussed Symptom improvement with patient they stated:  He  is getting better     6) Discussed medication and asked if he/she currently needs any refills, Pt stated:   Denies at this time and that the pt is only taking tylenol for pain at this time.       7) Confirm post-op appointments with patient:    2 week: 2/13/2024 at 1000 with DARLEEN FOFANA  6/8 week: 3/14/2023 at 1000 with Dr. Spicer     8) Discussed Surgical Restrictions with patient:  No baths/pool/hot tub  No bending, lifting, twisting at least until 2 week follow up  (Verify patient has acknowledged)   Pt and Adrienne acknowledged.        9)Verified if pt had any  other issues they needed to discuss:  Inquired on the use of Compression socks . Advised pt and Adrienne that if the patient is taking frequent walks around the home he does not need to wear the compression socks. However, if the patient has a prolonged period of immobility for 2 hours or more it is recommended to wear the compression socks to prevent a DVT. Adrienne acknowledged and is appreciative of the call     No further questions or concerns at this time.   Call was ended.      Routed to provider for update

## 2024-02-13 ENCOUNTER — OFFICE VISIT (OUTPATIENT)
Dept: SURGERY | Facility: CLINIC | Age: 79
End: 2024-02-13
Payer: MEDICARE

## 2024-02-13 VITALS
SYSTOLIC BLOOD PRESSURE: 118 MMHG | BODY MASS INDEX: 21.67 KG/M2 | WEIGHT: 160 LBS | HEART RATE: 51 BPM | HEIGHT: 72 IN | DIASTOLIC BLOOD PRESSURE: 60 MMHG

## 2024-02-13 DIAGNOSIS — Z98.890 S/P LUMBAR LAMINECTOMY: Primary | ICD-10-CM

## 2024-02-13 PROCEDURE — 99024 POSTOP FOLLOW-UP VISIT: CPT | Performed by: NURSE PRACTITIONER

## 2024-02-13 RX ORDER — ACETAMINOPHEN 325 MG/1
325 TABLET ORAL EVERY 6 HOURS PRN
COMMUNITY

## 2024-02-13 NOTE — PROGRESS NOTES
[facility]   Outpatient Neurological Surgery Follow Up    Dev Johnston  : 1945  PCP: Fadi Jarrell MD  Referring Provider: No ref. provider found    REASON FOR VISIT:  Chief Complaint   Patient presents with    Post-Op     2 week post op, sx 24 LUMBAR 3 AND LUMBAR 4 LAMINECTOMIES WITH MEDIAL FACETECTOMIES        HISTORY OF PRESENT ILLNESS:  Dev Johnston is a 78 year old male who presents today for a 2 week post op visit.   Pt states he is doing well from surgery.  He reports occasional sharp pain to the right side of his incision.  He is only taking Tylenol for pain.  He states his pre op knee pain is improved.  He denies wound concerns      PAST MEDICAL HISTORY:  Past Medical History:   Diagnosis Date    Adverse drug reaction 12/10/2014    Age-related cataract of both eyes 2021    Arthritis 2010    Asthma     Back pain 2010    Back problem     Benign neoplasm of colon     BPH (benign prostatic hyperplasia) 2013    Calculus of kidney ?    COPD (chronic obstructive pulmonary disease) ([facility])     NO HOME O2    Coronary atherosclerosis     Diverticulosis of colon (without mention of hemorrhage)     Easy bruising 2010    Esophageal reflux     Flatulence/gas pain/belching occasional    Frequent urination 2010    Frequent use of laxatives occasional    Heartburn 1998?    Hemorrhoids ?    High blood pressure     High cholesterol     History of cardiac murmur ?    Hx of heart artery stent     Prostate nodule 2016    Had biopsy 2016    Stented coronary artery ?    one    Unspecified hemorrhoids without mention of complication 2012    Visual impairment     READING GLASSES    Wears glasses     Wheezing        PAST SURGICAL HISTORY:  Past Surgical History:   Procedure Laterality Date    ANGIOPLASTY (CORONARY)  2011    APPENDECTOMY  1949    CATARACT Bilateral 2021    cataract removed    COLONOSCOPY  2012    + polyps     INSERT INTRACORONARY STENT      OTHER SURGICAL HISTORY  02/02/2022    Cystoscopy Dr. Olmos     OTHER SURGICAL HISTORY  01/29/2024    LUMBAR 3 AND LUMBAR 4 LAMINECTOMIES WITH MEDIAL FACETECTOMIES    TONSILLECTOMY      UPPER GI ENDOSCOPY - REFERRAL  03/22/2012    esophagitis, repeat in 5 year       SOCIAL HISTORY:   reports that he quit smoking about 12 years ago. His smoking use included cigarettes. He started smoking about 56 years ago. He has a 40 pack-year smoking history. He has never used smokeless tobacco. He reports that he does not currently use drugs after having used the following drugs: Cannabis. He reports that he does not drink alcohol.      ALLERGIES:  No Known Allergies    MEDICATIONS:  Current Outpatient Medications   Medication Sig Dispense Refill    acetaminophen 325 MG Oral Tab Take 1 tablet (325 mg total) by mouth every 6 (six) hours as needed for Pain.      ipratropium (ATROVENT HFA) 17 MCG/ACT Inhalation Aero Soln Inhale 2 puffs into the lungs 3 (three) times daily. 1 each 2    silodosin (RAPAFLO) 8 MG Oral Cap Take 1 capsule (8 mg total) by mouth every evening. 90 capsule 5    finasteride 5 MG Oral Tab Take 1 tablet (5 mg total) by mouth daily. 90 tablet 6    sertraline 50 MG Oral Tab Take 1 tablet (50 mg total) by mouth daily. 90 tablet 1    metoprolol succinate ER 25 MG Oral Tablet 24 Hr Take 1 tablet (25 mg total) by mouth daily. 90 tablet 3    Sildenafil Citrate 100 MG Oral Tab Take 1 tablet (100 mg total) by mouth as needed for Erectile Dysfunction. 4 tablet 11    simvastatin 20 MG Oral Tab TAKE 1 TABLET BY MOUTH EVERY NIGHT AT BEDTIME 90 tablet 3    losartan 50 MG Oral Tab Take 1 tablet (50 mg total) by mouth daily. 90 tablet 3    Omeprazole Magnesium 20 MG Oral Tab EC Take 1 tablet (20 mg total) by mouth daily. 90 tablet 3    diazePAM 2 MG Oral Tab Take 1 tablet (2 mg total) by mouth every 6 (six) hours as needed (Muscle spasms). (Patient not taking: Reported on 2/13/2024) 30 tablet 0     traMADol 50 MG Oral Tab Take 1-2 tablets ( mg total) by mouth every 6 (six) hours as needed for Pain. (Patient not taking: Reported on 2/13/2024) 60 tablet 0       REVIEW OF SYSTEMS:  Pertinent positives and negatives are noted in HPI.      PHYSICAL EXAMINATION:  VITAL SIGNS: /60   Pulse 51   Ht 72\"   Wt 160 lb (72.6 kg)   BMI 21.70 kg/m²   GENERAL:  Patient is a 78 year old male in no apparent distress.  HEENT:  Normocephalic, atraumatic.  NEUROLOGICAL:  This patient is alert and orientated x 3.  Speech fluent. Able to follow simple commands.  CN II - XII intact.  LIGHT x 4.  Gait deferred  INTEGUMENTARY:   Surgical incision approximated with Dermabond.  Healing well    IMAGING:  None    ASSESSMENT:  S/p L3, L4 laminectomies    PLAN:  Continue BLT restrictions until 6 weeks post op  2.   Ok to gently scrub incision while showering to remove remaining Dermabond  3.   F/u with Dr. Spicer as scheduled          Total time spent:  20 minutes  Greater than 50% of the time was spent on counseling/coordination of care.  Nature of education / counseling: Pathology, treatment options, and expected outcomes    ELVIN Lizarraga  2/13/2024, 10:05 AM

## 2024-02-13 NOTE — PATIENT INSTRUCTIONS
Refill policies:    Allow 2-3 business days for refills; controlled substances may take longer.  Contact your pharmacy at least 5 days prior to running out of medication and have them send an electronic request or submit request through the “request refill” option in your [employer] account.  Refills are not addressed on weekends; covering physicians do not authorize routine medications on weekends.  No narcotics or controlled substances are refilled after noon on Fridays or by on call physicians.  By law, narcotics must be electronically prescribed.  A 30 day supply with no refills is the maximum allowed.  If your prescription is due for a refill, you may be due for a follow up appointment.  To best provide you care, patients receiving routine medications need to be seen at least once a year.  Patients receiving narcotic/controlled substance medications need to be seen at least once every 3 months.  In the event that your preferred pharmacy does not have the requested medication in stock (e.g. Backordered), it is your responsibility to find another pharmacy that has the requested medication available.  We will gladly send a new prescription to that pharmacy at your request.    Scheduling Tests:    If your physician has ordered radiology tests such as MRI or CT scans, please contact Central Scheduling at [phone] right away to schedule the test.  Once scheduled, the [facility] Centralized Referral Team will work with your insurance carrier to obtain pre-certification or prior authorization.  Depending on your insurance carrier, approval may take 3-10 days.  It is highly recommended patients assure they have received an authorization before having a test performed.  If test is done without insurance authorization, patient may be responsible for the entire amount billed.      Precertification and Prior Authorizations:  If your physician has recommended that you have a procedure or additional testing performed the [facility]  Centralized Referral Team will contact your insurance carrier to obtain pre-certification or prior authorization.    You are strongly encouraged to contact your insurance carrier to verify that your procedure/test has been approved and is a COVERED benefit.  Although the [facility] Centralized Referral Team does its due diligence, the insurance carrier gives the disclaimer that \"Although the procedure is authorized, this does not guarantee payment.\"    Ultimately the patient is responsible for payment.   Thank you for your understanding in this matter.  Paperwork Completion:  If you require FMLA or disability paperwork for your recovery, please make sure to either drop it off or have it faxed to our office at [phone]. Be sure the form has your name and date of birth on it.  The form will be faxed to our Forms Department and they will complete it for you.  There is a 25$ fee for all forms that need to be filled out.  Please be aware there is a 10-14 day turnaround time.  You will need to sign a release of information (BRIEN) form if your paperwork does not come with one.  You may call the Forms Department with any questions at [phone].  Their fax number is [phone].

## 2024-02-13 NOTE — PROGRESS NOTES
Established patient:  Reason for follow up:   2 week post op,sx 01/29/24    Numeric Rating Scale:        Pain at Present:  2/10

## 2024-03-14 ENCOUNTER — OFFICE VISIT (OUTPATIENT)
Dept: SURGERY | Facility: CLINIC | Age: 79
End: 2024-03-14
Payer: MEDICARE

## 2024-03-14 VITALS
HEIGHT: 72 IN | HEART RATE: 60 BPM | DIASTOLIC BLOOD PRESSURE: 70 MMHG | WEIGHT: 160 LBS | SYSTOLIC BLOOD PRESSURE: 110 MMHG | BODY MASS INDEX: 21.67 KG/M2

## 2024-03-14 DIAGNOSIS — G89.29 CHRONIC MIDLINE LOW BACK PAIN WITHOUT SCIATICA: Primary | ICD-10-CM

## 2024-03-14 DIAGNOSIS — M54.50 CHRONIC MIDLINE LOW BACK PAIN WITHOUT SCIATICA: Primary | ICD-10-CM

## 2024-03-14 PROCEDURE — 99024 POSTOP FOLLOW-UP VISIT: CPT | Performed by: NEUROLOGICAL SURGERY

## 2024-03-14 RX ORDER — MELOXICAM 15 MG/1
15 TABLET ORAL DAILY
COMMUNITY
Start: 2024-02-14

## 2024-03-14 RX ORDER — METHYLPREDNISOLONE 4 MG/1
TABLET ORAL
Qty: 1 EACH | Refills: 1 | Status: SHIPPED | OUTPATIENT
Start: 2024-03-14

## 2024-03-14 NOTE — PROGRESS NOTES
Established patient:  Reason for follow up: post op   LUMBAR 3 AND LUMBAR 4 LAMINECTOMIES WITH MEDIAL FACETECTOMIES AND ALL INDICATED PROCEDURES     Numeric Rating Scale: (No Pain) 0  to  10 (Worst Pain)        Pain at Present:  4/10       Most recent treatments for Current Pain Condition:   Surgery  Response to treatment: some relief    New imaging or testing since your last office visit:

## 2024-03-14 NOTE — PATIENT INSTRUCTIONS
Refill policies:    Allow 2-3 business days for refills; controlled substances may take longer.  Contact your pharmacy at least 5 days prior to running out of medication and have them send an electronic request or submit request through the “request refill” option in your [employer] account.  Refills are not addressed on weekends; covering physicians do not authorize routine medications on weekends.  No narcotics or controlled substances are refilled after noon on Fridays or by on call physicians.  By law, narcotics must be electronically prescribed.  A 30 day supply with no refills is the maximum allowed.  If your prescription is due for a refill, you may be due for a follow up appointment.  To best provide you care, patients receiving routine medications need to be seen at least once a year.  Patients receiving narcotic/controlled substance medications need to be seen at least once every 3 months.  In the event that your preferred pharmacy does not have the requested medication in stock (e.g. Backordered), it is your responsibility to find another pharmacy that has the requested medication available.  We will gladly send a new prescription to that pharmacy at your request.    Scheduling Tests:    If your physician has ordered radiology tests such as MRI or CT scans, please contact Central Scheduling at [phone] right away to schedule the test.  Once scheduled, the [facility] Centralized Referral Team will work with your insurance carrier to obtain pre-certification or prior authorization.  Depending on your insurance carrier, approval may take 3-10 days.  It is highly recommended patients assure they have received an authorization before having a test performed.  If test is done without insurance authorization, patient may be responsible for the entire amount billed.      Precertification and Prior Authorizations:  If your physician has recommended that you have a procedure or additional testing performed the [facility]  Centralized Referral Team will contact your insurance carrier to obtain pre-certification or prior authorization.    You are strongly encouraged to contact your insurance carrier to verify that your procedure/test has been approved and is a COVERED benefit.  Although the [facility] Centralized Referral Team does its due diligence, the insurance carrier gives the disclaimer that \"Although the procedure is authorized, this does not guarantee payment.\"    Ultimately the patient is responsible for payment.   Thank you for your understanding in this matter.  Paperwork Completion:  If you require FMLA or disability paperwork for your recovery, please make sure to either drop it off or have it faxed to our office at [phone]. Be sure the form has your name and date of birth on it.  The form will be faxed to our Forms Department and they will complete it for you.  There is a 25$ fee for all forms that need to be filled out.  Please be aware there is a 10-14 day turnaround time.  You will need to sign a release of information (BRIEN) form if your paperwork does not come with one.  You may call the Forms Department with any questions at [phone].  Their fax number is [phone].

## 2024-03-14 NOTE — PROGRESS NOTES
Neurosurgery Clinic Visit  3/14/2024    Dev Johnston PCP:  Fadi Jarrell MD    1945 MRN [id number]     HISTORY OF PRESENT ILLNESS:  Dev Johnston is a(n) 78 year old male here for follow-up status post lumbar laminectomies  He is doing really well  He is able to walk more  He is walking 3 blocks at a time now  He has a slight pain just to the right of his midline  He is here for follow-up      PHYSICAL EXAMINATION:  Vital Signs:  /70 (BP Location: Right arm, Patient Position: Sitting, Cuff Size: large)   Pulse 60   Ht 72\"   Wt 160 lb (72.6 kg)   BMI 21.70 kg/m²   Awake alert, orient x 3  Follows commands x 4  Incision well-healed      REVIEW OF STUDIES:    Preoperative MRI reviewed      ASSESSMENT and PLAN:  78-year-old gentleman status post lumbar laminectomies  He is doing great  His restrictions are lifted  Discussed healing in general  They have a trip planned to go [place]  Give him Medrol Dosepak to have just in case  All questions answered  He will follow-up as needed  Patient and wife very appreciative        Time spent on counseling/coordination of care:  20 minutes    Total time spent with patient:  20 minutes      Salvador Spicer MD   [facility]  3/14/2024  10:32 AM   Dictated but not proofread

## 2024-03-19 PROBLEM — G25.3 MYOCLONIC JERKING: Status: ACTIVE | Noted: 2024-03-19

## 2024-04-18 NOTE — PROGRESS NOTES
HPI:     Dev Johnston is a 78 year old male with a PMH of HTN, HL, GERD OA.    Following for:  1. BPH/LUTS + h/o bladder stones  - s/p urolift and cystolithopaxy ([facility]) 3/22/22  - carlos/pro since ~ 2014, stopped ~ 2022, back on carlos/pro 9/25/23  - Cysto with me (10/23/23): mod BPH with very high riding neck in the mid-prostate up to the bladder which is likely iatrogenic/due to prior urolift. Mod BREANA. Patulous right ureter presumably indicating reflux.  2. chronic right hydro  - noted on CT since 2012 and stable  3. Recurrent MDR UTIs  - UCx 9/11/23, 11/21/22 MDR E coli S to gent, macrobid, zosyn  3. PVD  - Kegels reviewed  4. ED  - 100 mg viagra prn  5. Fam h/o CaP  - dad in 60s tx with XRT    PCP - Chrystal  Prior Urologist - [facility] 4/6/22  LOV 10/23/2023    Presents for symptom check, cysto.    Had urolift in March 2022, stopped carlos/pro and developed 2 septic UTIs afterwards requiring hospitalization. No prior UTIs.  Had UTI in Nov 2022.  Admitted from 9/12/23 - 9/16/23 with UTI, bacteremia. He had severe right flank pain while admitted but has two fractured vertebrae which may have been contributing.     He is taking carlos/pro and driking a lot more water. He still has lightheadedness  Takes ibuprofen and alleve prn back pain  Exercise: yes, but just had back surgery  Snoring: none  No interim UTIs.    He had back surgery in November.    AUA SS is 4/35 with 2 n, f. Was 17/35 prior to carlos/pro with 4 n; 3 w, I, FERNANDO; 2 u; 1 f. Mostly happy with LUTS.  Incontinence: PVD which developed after urolift and using 1 PPD and damp. He is doing Kegels  Pensocrotal LOV: no abnormalities  BILLY LOV: ~ 40 g prostate, no nodules or tenderness    UA is negative and PVR was zero    Hip/back pain: yes - b/l  Diarrhea/constipation: none  UTI hx: 2 since urolift. Nothing prior  Gross hematuria: 5-6 y ago  Tobacco hx: 30 pack years, quit 2006  Kidney stone hx: none    < 5 % potency without meds and poor potency with 100 mg  viagra prn.  Discussed a trial of 20 mg cialis versus trimix teaching and risks and benefits to both.  He declines trimix teaching and would like to try cialis.    PSA 2.33 7/12/23    CT SP 9/12/23: chronic, low lying right kidney with probable chronic mild-mod right UPJO, stable from CT in Nov 2022 and stable from CT report 1/9/12    Reported no plain water, 40-60 dilute tea oz water with clear urine. Now 40-60 water, no dilute tea with clear urine. I encouraged the pt continue to drink at least 40-60 oz water per day or enough to keep urine clear to pale yellow for UTI prevention.    Have discussed the right hydronephrosis appears stable since at least 2012. He has a patulous right distal ureteral orifice so may have a mild component of reflux as well.    For nocturia, I'd recommend the patient drink plenty of fluids first thing in the morning and avoid drinking anything at least 2-3 hours prior to bedtime. If the patient takes diuretic I would recommend they take them first thing in the morning. If the patient takes NSAIDs I would consider they switch to taking prior to bedtime. I'd also encourage regular physical activity (for at least 30 minutes at least three times per week) as this has been shown to help with nocturia. Finally we discussed that setting a regular bedtime can help regulate nocturnal levels of melatonin and ADH, which can help with sleep and reduce nocturia.    Discussed option to switch from tamsulosin to silodosin given the persistent lightheadedness and he wants to try this.      He will continue significantly increased water intake and has cut back on tea for adequate hydration/UTI prevention. Start exercising and consider switching meloxicam to qhs for nocturia with fluid adjustments for nocturia. Continue flomax, proscar. Continue Kegels. May continue 100 mg viagra prn. Will stop tamsulosin and start rapaflo. Continue proscar. F/u in 6 mo for check-up.    Prior note:  As symptoms worsened  since urolift I would recommend cysto at some point in the near future to determine if foreign bodies are noted within the lumen that could be contributing to issues.    For nocturia, I'd recommend the patient drink plenty of fluids first thing in the morning and avoid drinking anything at least 2-3 hours prior to bedtime. If the patient takes diuretic I would recommend they take them first thing in the morning. If the patient takes NSAIDs I would consider they switch to taking prior to bedtime. I'd also encourage regular physical activity (for at least 30 minutes at least three times per week) as this has been shown to help with nocturia. Finally we discussed that setting a regular bedtime can help regulate nocturnal levels of melatonin and ADH, which can help with sleep and reduce nocturia.    HISTORY:  Past Medical History:    Adverse drug reaction    Age-related cataract of both eyes    Arthritis    Asthma (HCC)    Back pain    Back problem    Benign neoplasm of colon    BPH (benign prostatic hyperplasia)    Calculus of kidney    COPD (chronic obstructive pulmonary disease) (HCC)    NO HOME O2    Coronary atherosclerosis    Diverticulosis of colon (without mention of hemorrhage)    Easy bruising    Esophageal reflux    Flatulence/gas pain/belching    Frequent urination    Frequent use of laxatives    Heartburn    Hemorrhoids    High blood pressure    High cholesterol    History of cardiac murmur    Hx of heart artery stent    Prostate nodule    Had biopsy 2016    Stented coronary artery    one    Unspecified hemorrhoids without mention of complication    Visual impairment    READING GLASSES    Wears glasses    Wheezing      Past Surgical History:   Procedure Laterality Date    Angioplasty (coronary)  05/2011    Appendectomy  1949    Cataract Bilateral 11/2021    cataract removed    Colonoscopy  11/2012    + polyps    Insert intracoronary stent      Other surgical history  02/02/2022    Cystoscopy Dr. Olmos      Other surgical history  2024    LUMBAR 3 AND LUMBAR 4 LAMINECTOMIES WITH MEDIAL FACETECTOMIES    Tonsillectomy      Upper gi endoscopy - referral  2012    esophagitis, repeat in 5 year      Family History   Problem Relation Age of Onset    Breast Cancer Sister     Other (CABG) Father         x4    Other (esophogeal reflux) Mother     Other (hip replacement) Mother       Social History:   Social History     Socioeconomic History    Marital status: Life Partner   Tobacco Use    Smoking status: Former     Current packs/day: 0.00     Average packs/day: 1 pack/day for 43.3 years (43.3 ttl pk-yrs)     Types: Cigarettes     Start date: 1968     Quit date: 2011     Years since quittin.9    Smokeless tobacco: Never   Vaping Use    Vaping status: Never Used   Substance and Sexual Activity    Alcohol use: No     Comment: Quit in . Prior had been a heavier drinker.    Drug use: Not Currently   Other Topics Concern    Caffeine Concern Yes    Exercise Yes     Comment: 6x/week     Social Determinants of Health     Financial Resource Strain: Low Risk  (2023)    Financial Resource Strain     Difficulty of Paying Living Expenses: Not very hard     Med Affordability: No   Food Insecurity: No Food Insecurity (2024)    Food Insecurity     Food Insecurity: Never true   Transportation Needs: No Transportation Needs (2024)    Transportation Needs     Lack of Transportation: No   Physical Activity: Sufficiently Active (10/16/2020)    [facility]    Exercise Vital Sign     Days of Exercise per Week: 7 days     Minutes of Exercise per Session: 30 min   Housing Stability: Low Risk  (2024)    Housing Stability     Housing Instability: No          Medications (Active prior to today's visit):  Current Outpatient Medications   Medication Sig Dispense Refill    Tadalafil (CIALIS) 20 MG Oral Tab Take 1 tablet (20 mg total) by mouth daily as needed for  Erectile Dysfunction. 30 tablet 5    silodosin (RAPAFLO) 8 MG Oral Cap Take 1 capsule (8 mg total) by mouth every evening. 90 capsule 5    finasteride 5 MG Oral Tab Take 1 tablet (5 mg total) by mouth daily. 90 tablet 6    zolpidem 5 MG Oral Tab Take 1 tablet (5 mg total) by mouth nightly as needed for Sleep. 30 tablet 1    ipratropium (ATROVENT HFA) 17 MCG/ACT Inhalation Aero Soln Inhale 2 puffs into the lungs 4 (four) times daily. 1 each 2    sertraline 50 MG Oral Tab Take 1 tablet (50 mg total) by mouth daily. 90 tablet 1    Meloxicam 15 MG Oral Tab Take 1 tablet (15 mg total) by mouth daily.      acetaminophen 325 MG Oral Tab Take 1 tablet (325 mg total) by mouth every 6 (six) hours as needed for Pain.      silodosin (RAPAFLO) 8 MG Oral Cap Take 1 capsule (8 mg total) by mouth every evening. 90 capsule 5    metoprolol succinate ER 25 MG Oral Tablet 24 Hr Take 1 tablet (25 mg total) by mouth daily. 90 tablet 3    Sildenafil Citrate 100 MG Oral Tab Take 1 tablet (100 mg total) by mouth as needed for Erectile Dysfunction. 4 tablet 11    simvastatin 20 MG Oral Tab TAKE 1 TABLET BY MOUTH EVERY NIGHT AT BEDTIME 90 tablet 3    losartan 50 MG Oral Tab Take 1 tablet (50 mg total) by mouth daily. 90 tablet 3    Omeprazole Magnesium 20 MG Oral Tab EC Take 1 tablet (20 mg total) by mouth daily. 90 tablet 3       Allergies:  No Known Allergies      ROS:     A comprehensive 10 point review of systems was completed.  Pertinent positives and negatives noted in the the HPI.    PHYSICAL EXAM:     GENERAL APPEARANCE: well, developed, well nourished, in no acute distress  NEUROLOGIC: nonfocal, alert and oriented  HEAD: normocephalic, atraumatic  EYES: sclera non-icteric  EARS: hearing intact  ORAL CAVITY: mucosa moist  NECK/THYROID: no obvious goiter or masses  LUNGS: nonlabored breathing  ABDOMEN: soft, no obvious masses or tenderness  SKIN: no obvious rashes    : as noted above    ASSESSMENT/PLAN:   Diagnoses and all orders for  this visit:    BPH with obstruction/lower urinary tract symptoms  -     silodosin (RAPAFLO) 8 MG Oral Cap; Take 1 capsule (8 mg total) by mouth every evening.  -     finasteride 5 MG Oral Tab; Take 1 tablet (5 mg total) by mouth daily.    Recurrent UTI    Post-void dribbling    Hydronephrosis of right kidney    Nocturia    Foreign body in bladder, initial encounter    Erectile dysfunction, unspecified erectile dysfunction type  -     Tadalafil (CIALIS) 20 MG Oral Tab; Take 1 tablet (20 mg total) by mouth daily as needed for Erectile Dysfunction.    - as noted above.    Thanks again for this consult.    Duran Kaur MD, FACS  Urologist  [facility]  Office: [phone]

## 2024-04-29 ENCOUNTER — OFFICE VISIT (OUTPATIENT)
Dept: SURGERY | Facility: CLINIC | Age: 79
End: 2024-04-29

## 2024-04-29 DIAGNOSIS — N39.0 RECURRENT UTI: ICD-10-CM

## 2024-04-29 DIAGNOSIS — T19.1XXA FOREIGN BODY IN BLADDER, INITIAL ENCOUNTER: ICD-10-CM

## 2024-04-29 DIAGNOSIS — N40.1 BPH WITH OBSTRUCTION/LOWER URINARY TRACT SYMPTOMS: Primary | ICD-10-CM

## 2024-04-29 DIAGNOSIS — N13.30 HYDRONEPHROSIS OF RIGHT KIDNEY: ICD-10-CM

## 2024-04-29 DIAGNOSIS — N13.8 BPH WITH OBSTRUCTION/LOWER URINARY TRACT SYMPTOMS: Primary | ICD-10-CM

## 2024-04-29 DIAGNOSIS — N52.9 ERECTILE DYSFUNCTION, UNSPECIFIED ERECTILE DYSFUNCTION TYPE: ICD-10-CM

## 2024-04-29 DIAGNOSIS — R35.1 NOCTURIA: ICD-10-CM

## 2024-04-29 DIAGNOSIS — N39.43 POST-VOID DRIBBLING: ICD-10-CM

## 2024-04-29 PROCEDURE — 99214 OFFICE O/P EST MOD 30 MIN: CPT | Performed by: UROLOGY

## 2024-04-29 PROCEDURE — 51798 US URINE CAPACITY MEASURE: CPT | Performed by: UROLOGY

## 2024-04-29 RX ORDER — SILODOSIN 8 MG/1
8 CAPSULE ORAL EVERY EVENING
Qty: 90 CAPSULE | Refills: 5 | Status: SHIPPED | OUTPATIENT
Start: 2024-04-29

## 2024-04-29 RX ORDER — TADALAFIL 20 MG/1
20 TABLET ORAL
Qty: 30 TABLET | Refills: 5 | Status: SHIPPED | OUTPATIENT
Start: 2024-04-29

## 2024-04-29 RX ORDER — FINASTERIDE 5 MG/1
5 TABLET, FILM COATED ORAL DAILY
Qty: 90 TABLET | Refills: 6 | Status: SHIPPED | OUTPATIENT
Start: 2024-04-29

## 2024-05-04 ENCOUNTER — OFFICE VISIT (OUTPATIENT)
Dept: FAMILY MEDICINE CLINIC | Facility: CLINIC | Age: 79
End: 2024-05-04
Payer: MEDICARE

## 2024-05-04 VITALS
DIASTOLIC BLOOD PRESSURE: 52 MMHG | HEIGHT: 72 IN | HEART RATE: 52 BPM | RESPIRATION RATE: 16 BRPM | BODY MASS INDEX: 21.94 KG/M2 | OXYGEN SATURATION: 96 % | WEIGHT: 162 LBS | SYSTOLIC BLOOD PRESSURE: 88 MMHG

## 2024-05-04 DIAGNOSIS — I70.0 AORTO-ILIAC ATHEROSCLEROSIS (HCC): ICD-10-CM

## 2024-05-04 DIAGNOSIS — I77.9 CAROTID ARTERY DISEASE, UNSPECIFIED LATERALITY, UNSPECIFIED TYPE (HCC): ICD-10-CM

## 2024-05-04 DIAGNOSIS — J43.9 PULMONARY EMPHYSEMA, UNSPECIFIED EMPHYSEMA TYPE (HCC): ICD-10-CM

## 2024-05-04 DIAGNOSIS — R06.09 DYSPNEA ON EXERTION: ICD-10-CM

## 2024-05-04 DIAGNOSIS — Z00.00 ENCOUNTER FOR ANNUAL HEALTH EXAMINATION: Primary | ICD-10-CM

## 2024-05-04 DIAGNOSIS — G25.3 MYOCLONIC JERKING: ICD-10-CM

## 2024-05-04 DIAGNOSIS — Z13.6 SCREENING FOR CARDIOVASCULAR CONDITION: ICD-10-CM

## 2024-05-04 DIAGNOSIS — I70.8 AORTO-ILIAC ATHEROSCLEROSIS (HCC): ICD-10-CM

## 2024-05-04 DIAGNOSIS — J98.6 DIAPHRAGM PARALYSIS: ICD-10-CM

## 2024-05-04 PROBLEM — N18.30 CKD (CHRONIC KIDNEY DISEASE) STAGE 3, GFR 30-59 ML/MIN (HCC): Chronic | Status: ACTIVE | Noted: 2024-05-04

## 2024-05-04 PROCEDURE — 96160 PT-FOCUSED HLTH RISK ASSMT: CPT | Performed by: FAMILY MEDICINE

## 2024-05-04 PROCEDURE — G0439 PPPS, SUBSEQ VISIT: HCPCS | Performed by: FAMILY MEDICINE

## 2024-05-04 RX ORDER — IPRATROPIUM BROMIDE 17 UG/1
2 AEROSOL, METERED RESPIRATORY (INHALATION) 2 TIMES DAILY
Qty: 1 EACH | Refills: 0 | Status: SHIPPED | OUTPATIENT
Start: 2024-05-04

## 2024-05-04 NOTE — PROGRESS NOTES
Subjective:   Dev Johnston is a 78 year old male who presents for a MA (Medicare Advantage) Supervisit (Once per calendar year) and acute complicated new problem.     With urology Dr. Kaur.  Is on Proscar and Rapaflo.  Feels like he has no energy.  He is lightheaded when he stands up.  We discussed dyspnea.  He has a paralyzed hemidiaphragm.  He saw Dr. Mattson previously.  They would like to see Dr. Rolle.  Dr. Mattson had offered pulmonary rehab.  He has a known systolic heart murmur.  Echocardiogram was performed in November 2023.  He follows with cardiology for atherosclerosis in the aorta and carotid arteries.  He is on metoprolol.    History/Other:   Fall Risk Assessment:   He has been screened for Falls and is High Risk. Fall Prevention information provided to patient in After Visit Summary.    Do you feel unsteady when standing or walking?: No  Do you worry about falling?: No  Have you fallen in the past year?: Yes  How many times have you fallen?: (P) 1  Were you injured?: (P) Yes     Cognitive Assessment:   Abnormal  What day of the week is this?: Correct  What month is it?: Correct  What year is it?: Correct  Recall \"Ball\": Correct  Recall \"Flag\": Correct  Recall \"Tree\": Incorrect    Functional Ability/Status:   Dev Johnston has a completely normal functional assessment. See flowsheet for details.        Depression Screening (PHQ-2/PHQ-9): PHQ-2 SCORE: 0  , done 4/29/2024             Advanced Directives:   He does have a Living Will but we do NOT have it on file in Epic.    He does have a POA but we do NOT have it on file in Epic.    Not discussed      Patient Active Problem List   Diagnosis    Benign essential HTN    Hypercholesterolemia    ED (erectile dysfunction) of organic origin    Carotid artery disease (HCC)    Aorto-iliac atherosclerosis (HCC)    Aortic ectasia (HCC)    Heart murmur, systolic    Bilateral sciatica    Macrocytosis without anemia    Open angle with  borderline findings and high glaucoma risk in both eyes    Seborrheic keratosis    Pulmonary emphysema ([facility])    Anxiety    Lumbar compression fracture, closed, initial encounter ([facility])    Lumbar stenosis with neurogenic claudication    Myoclonic jerking    Diaphragm paralysis    CKD (chronic kidney disease) stage 3, GFR 30-59 ml/min ([facility])     Allergies:  He has No Known Allergies.    Current Medications:  Outpatient Medications Marked as Taking for the 5/4/24 encounter (Office Visit) with Fadi Jarrell MD   Medication Sig    ipratropium (ATROVENT HFA) 17 MCG/ACT Inhalation Aero Soln Inhale 2 puffs into the lungs 2 (two) times daily.       Medical History:  He  has a past medical history of Adverse drug reaction (12/10/2014), Age-related cataract of both eyes (08/04/2021), Arthritis (2010), Asthma ([facility]), Back pain (2010), Back problem, Benign neoplasm of colon, BPH (benign prostatic hyperplasia) (07/29/2013), Calculus of kidney (?), COPD (chronic obstructive pulmonary disease) (HCC), Coronary atherosclerosis, Diverticulosis of colon (without mention of hemorrhage), Easy bruising (2010), Esophageal reflux, Flatulence/gas pain/belching (occasional), Frequent urination (2010), Frequent use of laxatives (occasional), Heartburn (1998?), Hemorrhoids (?), High blood pressure, High cholesterol, History of cardiac murmur (?), heart artery stent, Prostate nodule (07/27/2016), Stented coronary artery (?), Unspecified hemorrhoids without mention of complication (06/29/2012), Visual impairment, Wears glasses (2020), and Wheezing (2010).  Surgical History:  He  has a past surgical history that includes colonoscopy (11/2012); insert intracoronary stent; tonsillectomy; upper gi endoscopy - referral (03/22/2012); cataract (Bilateral, 11/2021); other surgical history (02/02/2022); angioplasty (coronary) (05/2011); appendectomy (1949); and other surgical history (01/29/2024).   Family History:  His family history includes Breast  Cancer in his sister; CABG in his father; esophogeal reflux in his mother; hip replacement in his mother.  Social History:  He  reports that he quit smoking about 13 years ago. His smoking use included cigarettes. He started smoking about 56 years ago. He has a 43.3 pack-year smoking history. He has never used smokeless tobacco. He reports that he does not currently use drugs. He reports that he does not drink alcohol.    Tobacco:  He smoked tobacco in the past but quit greater than 12 months ago.  Social History     Tobacco Use   Smoking Status Former    Current packs/day: 0.00    Average packs/day: 1 pack/day for 43.3 years (43.3 ttl pk-yrs)    Types: Cigarettes    Start date: 1968    Quit date: 2011    Years since quittin.0   Smokeless Tobacco Never          CAGE Alcohol Screen:   CAGE screening score of 0 on 2024, showing low risk of alcohol abuse.      Patient Care Team:  Fadi Jarrell MD as PCP - General (Family Medicine)  Jacquelyn Mojica PT as Physical Therapist (Physical Therapy)  Lena Spring APRN (Nurse Practitioner)  Salvador Spicer MD (NEUROSURGERY)  Jose Hyde MD (GASTROENTEROLOGY)  Carmela Olmos MD (UROLOGY)  Bren Woodard MD as Referring Physician (INFECTIOUS DISEASES)  Salvador Polo PT (Physical Therapy)  Hitesh Avery MD (Interventional, Cardiology)  Dilcia Pressley APN (Nurse Practitioner Family)        Objective:   Physical Exam  Ear canals clear.  Tympanic membranes normal.  Eyes pupils equal round active to light.  Extraocular muscles intact anicteric sclera pink conjunctiva.  Mouth is moist and clear.  Neck no retractions no lymphadenopathy.  Heart regular rate and rhythm normal S1-S2 systolic decrescendo murmur loudest at the right upper sternal border.  Lungs are clear without crackles nor wheezes.  Abdomen soft nontender positive bowel sounds no rebound guarding or masses.  Extremities 2+ pulses no edema.  Reflexes are 3+.  Light touch is  equal right to left.  BP (!) 88/52   Pulse 52   Resp 16   Ht 6' (1.829 m)   Wt 162 lb (73.5 kg)   SpO2 96%   BMI 21.97 kg/m²  Estimated body mass index is 21.97 kg/m² as calculated from the following:    Height as of this encounter: 6' (1.829 m).    Weight as of this encounter: 162 lb (73.5 kg).    Medicare Hearing Assessment:   Tuning forks audible bilaterally x3      Visual Acuity:   Right Eye Visual Acuity: Uncorrected Right Eye Chart Acuity: 20/25   Left Eye Visual Acuity: Uncorrected Left Eye Chart Acuity: 20/40   Both Eyes Visual Acuity: Uncorrected Both Eyes Chart Acuity: 20/25   Able To Tolerate Visual Acuity: Yes        Assessment & Plan:   Dev Johnston is a 78 year old male who presents for a Medicare Assessment.     1. Encounter for annual health examination (Primary)  -     Lipid Panel  -     Comp Metabolic Panel (14)  -     CBC With Differential With Platelet  Vaccines up-to-date except for Prevnar 20.  Prevnar 20 benefits unknown since he has had both Pneumovax 23 and Prevnar 13  2. Myoclonic jerking  May be secondary to sertraline.  I still suspect these are idiopathic.  We are discontinuing sertraline.  Could start workup for anoxic brain injury and neuropathies.  Could be related to celiac disease.  Could be related to Alzheimer's dementia.  Will see how he responds after a month.  3. Pulmonary emphysema, unspecified emphysema type (HCC)  -     Pulmonary Rehab Referral - [facility]  -     Pulmonary Referral - In Network  -     Atrovent HFA; Inhale 2 puffs into the lungs 2 (two) times daily.  Dispense: 1 each; Refill: 0 we are decreasing the dose from 4 times a day to 2 times a day.  If he sees no worsening he will go off the medicine.  Complicated by the use of metoprolol a beta-blocker which may be worsening lung function.  Cannot stop this because of the atherosclerosis.  He follows up with cardiology in the fall.  4. Diaphragm paralysis  Comments:  per pulmonary notes, found  on sniff test 2022  Overview:  per pulmonary notes, found on sniff test 2022  Orders:  -     Pulmonary Rehab Referral - [facility]  -     Pulmonary Referral - In Network  5. Aorto-iliac atherosclerosis (HCC)  -     Lipid Panel  Continue beta-blocker for now  6. Carotid artery disease, unspecified laterality, unspecified type (HCC)  Overview:  Per cardiology note 10/2015  Orders:  -     Lipid Panel  Continue beta-blocker for now  7. Dyspnea on exertion  -     CBC With Differential With Platelet  Multifactorial with pulmonary emphysema and the use of beta-blockers.  Also paralyzed hemidiaphragm/Dr. Mattson had suggested pulmonary rehab.  I have placed this order.  8. Screening for cardiovascular condition  -     Lipid Panel    The patient indicates understanding of these issues and agrees to the plan.  Consult ordered.  Lab work ordered.      Return in 4 weeks (on 6/1/2024).     Fadi Jarrell MD, 5/4/2024     Supplementary Documentation:   General Health:  In the past six months, have you lost more than 10 pounds without trying?: 2 - No  Has your appetite been poor?: No  Type of Diet: Balanced  How does the patient maintain a good energy level?: Daily Walks;Stretching  How would you describe your daily physical activity?: Light (due to COPD and Dizziness -low bp)  How would you describe your current health state?: Fair  How do you maintain positive mental well-being?: Social Interaction;Visiting Friends;Visiting Family  On a scale of 0 to 10, with 0 being no pain and 10 being severe pain, what is your pain level?: 5 - (Moderate)  In the past six months, have you experienced urine leakage?: 1-Yes  At any time do you feel concerned for the safety/well-being of yourself and/or your children, in your home or elsewhere?: No  Have you had any immunizations at another office such as Influenza, Hepatitis B, Tetanus, or Pneumococcal?: Yes        Dev Johnston's SCREENING SCHEDULE   Tests on this list are  recommended by your physician but may not be covered, or covered at this frequency, by your insurer.   Please check with your insurance carrier before scheduling to verify coverage.   PREVENTATIVE SERVICES FREQUENCY &  COVERAGE DETAILS LAST COMPLETION DATE   Diabetes Screening    Fasting Blood Sugar / Glucose    One screening every 12 months if never tested or if previously tested but not diagnosed with pre-diabetes   One screening every 6 months if diagnosed with pre-diabetes Lab Results   Component Value Date     (H) 01/12/2024        Cardiovascular Disease Screening    Lipid Panel  Cholesterol  Lipoprotein (HDL)  Triglycerides Covered every 5 years for all Medicare beneficiaries without apparent signs or symptoms of cardiovascular disease Lab Results   Component Value Date    CHOLEST 156 07/12/2023    HDL 62 (H) 07/12/2023    LDL 81 07/12/2023    TRIG 63 07/12/2023         Electrocardiogram (EKG)   Covered if needed at Welcome to Medicare, and non-screening if indicated for medical reasons 01/12/2024      Ultrasound Screening for Abdominal Aortic Aneurysm (AAA) Covered once in a lifetime for one of the following risk factors    Men who are 65-75 years old and have ever smoked    Anyone with a family history -     Colorectal Cancer Screening  Covered for ages 50-85; only need ONE of the following:    Colonoscopy   Covered every 10 years    Covered every 2 years if patient is at high risk or previous colonoscopy was abnormal -    No recommendations at this time    Flexible Sigmoidoscopy   Covered every 4 years -    Fecal Occult Blood Test Covered annually -   Prostate Cancer Screening    Prostate-Specific Antigen (PSA) Annually No results found for: \"PSA\"  There are no preventive care reminders to display for this patient.   Immunizations    Influenza Covered once per flu season  Please get every year 10/15/2023  No recommendations at this time    Pneumococcal Each vaccine ([id number] & [id number]) covered  once after 65 Prevnar 13: 07/25/2016    [id number]: 01/01/2006     Pneumococcal Vaccination(3 of 3 - PPSV23 or PCV20) due on 07/25/2017    Hepatitis B One screening covered for patients with certain risk factors   -  No recommendations at this time    Tetanus Toxoid Not covered by Medicare Part B unless medically necessary (cut with metal); may be covered with your pharmacy prescription benefits -    Tetanus, Diptheria and Pertusis TD and TDaP Not covered by Medicare Part B -  No recommendations at this time    Zoster Not covered by Medicare Part B; may be covered with your pharmacy  prescription benefits 01/01/2008  No recommendations at this time     Chronic Obstructive Pulmonary Disease (COPD)    Spirometry Annually Spirometry date:

## 2024-05-04 NOTE — PATIENT INSTRUCTIONS
Stop losartan due to low blood pressure    Stop sertraline due to the jerks in the knees.    Hold silodosin/Rapaflo for 1 week.  If urine dribbling does not worsen okay to stay off of this medicine    Labs to be done by July or August.  Dev Johnston's SCREENING SCHEDULE   Tests on this list are recommended by your physician but may not be covered, or covered at this frequency, by your insurer.   Please check with your insurance carrier before scheduling to verify coverage.   PREVENTATIVE SERVICES FREQUENCY &  COVERAGE DETAILS LAST COMPLETION DATE   Diabetes Screening    Fasting Blood Sugar / Glucose    One screening every 12 months if never tested or if previously tested but not diagnosed with pre-diabetes   One screening every 6 months if diagnosed with pre-diabetes Lab Results   Component Value Date     (H) 01/12/2024        Cardiovascular Disease Screening    Lipid Panel  Cholesterol  Lipoprotein (HDL)  Triglycerides Covered every 5 years for all Medicare beneficiaries without apparent signs or symptoms of cardiovascular disease Lab Results   Component Value Date    CHOLEST 156 07/12/2023    HDL 62 (H) 07/12/2023    LDL 81 07/12/2023    TRIG 63 07/12/2023         Electrocardiogram (EKG)   Covered if needed at Welcome to Medicare, and non-screening if indicated for medical reasons 01/12/2024      Ultrasound Screening for Abdominal Aortic Aneurysm (AAA) Covered once in a lifetime for one of the following risk factors   • Men who are 65-75 years old and have ever smoked   • Anyone with a family history -     Colorectal Cancer Screening  Covered for ages 50-85; only need ONE of the following:    Colonoscopy   Covered every 10 years    Covered every 2 years if patient is at high risk or previous colonoscopy was abnormal -    No recommendations at this time    Flexible Sigmoidoscopy   Covered every 4 years -    Fecal Occult Blood Test Covered annually -   Prostate Cancer Screening    Prostate-Specific  Antigen (PSA) Annually No results found for: \"PSA\"  There are no preventive care reminders to display for this patient.   Immunizations    Influenza Covered once per flu season  Please get every year 10/15/2023  No recommendations at this time    Pneumococcal Each vaccine ([id number] & [id number]) covered once after 65 Prevnar 13: 07/25/2016    Vkaidbici62: 01/01/2006     Pneumococcal Vaccination(3 of 3 - PPSV23 or PCV20) due on 07/25/2017    Hepatitis B One screening covered for patients with certain risk factors   -  No recommendations at this time    Tetanus Toxoid Not covered by Medicare Part B unless medically necessary (cut with metal); may be covered with your pharmacy prescription benefits -    Tetanus, Diptheria and Pertusis TD and TDaP Not covered by Medicare Part B -  No recommendations at this time    Zoster Not covered by Medicare Part B; may be covered with your pharmacy  prescription benefits 01/01/2008  No recommendations at this time     Chronic Obstructive Pulmonary Disease (COPD)    Spirometry Annually Spirometry date:

## 2024-06-28 DIAGNOSIS — F41.9 ANXIETY: ICD-10-CM

## 2024-08-03 ENCOUNTER — OFFICE VISIT (OUTPATIENT)
Dept: FAMILY MEDICINE CLINIC | Facility: CLINIC | Age: 79
End: 2024-08-03
Payer: MEDICARE

## 2024-08-03 VITALS
HEIGHT: 72 IN | WEIGHT: 165 LBS | DIASTOLIC BLOOD PRESSURE: 50 MMHG | HEART RATE: 52 BPM | BODY MASS INDEX: 22.35 KG/M2 | OXYGEN SATURATION: 95 % | SYSTOLIC BLOOD PRESSURE: 88 MMHG

## 2024-08-03 DIAGNOSIS — I95.2 HYPOTENSION DUE TO DRUGS: ICD-10-CM

## 2024-08-03 DIAGNOSIS — R68.89 COLD INTOLERANCE: ICD-10-CM

## 2024-08-03 DIAGNOSIS — E78.00 HYPERCHOLESTEROLEMIA: ICD-10-CM

## 2024-08-03 DIAGNOSIS — M47.816 ARTHRITIS OF LUMBAR SPINE: ICD-10-CM

## 2024-08-03 DIAGNOSIS — N18.31 STAGE 3A CHRONIC KIDNEY DISEASE (HCC): Chronic | ICD-10-CM

## 2024-08-03 DIAGNOSIS — N13.8 BPH WITH OBSTRUCTION/LOWER URINARY TRACT SYMPTOMS: ICD-10-CM

## 2024-08-03 DIAGNOSIS — N40.1 BPH WITH OBSTRUCTION/LOWER URINARY TRACT SYMPTOMS: ICD-10-CM

## 2024-08-03 DIAGNOSIS — R29.6 FALLING: ICD-10-CM

## 2024-08-03 DIAGNOSIS — I10 BENIGN ESSENTIAL HTN: Primary | ICD-10-CM

## 2024-08-03 DIAGNOSIS — F41.9 ANXIETY: ICD-10-CM

## 2024-08-03 PROBLEM — S32.000A LUMBAR COMPRESSION FRACTURE, CLOSED, INITIAL ENCOUNTER (HCC): Status: RESOLVED | Noted: 2023-09-22 | Resolved: 2024-08-03

## 2024-08-03 PROBLEM — D69.6 THROMBOCYTOPENIA (HCC): Chronic | Status: ACTIVE | Noted: 2024-08-03

## 2024-08-03 PROBLEM — M54.32 BILATERAL SCIATICA: Status: RESOLVED | Noted: 2022-01-21 | Resolved: 2024-08-03

## 2024-08-03 PROBLEM — D69.6 THROMBOCYTOPENIA: Chronic | Status: ACTIVE | Noted: 2024-08-03

## 2024-08-03 PROBLEM — M54.31 BILATERAL SCIATICA: Status: RESOLVED | Noted: 2022-01-21 | Resolved: 2024-08-03

## 2024-08-03 PROBLEM — M48.062 LUMBAR STENOSIS WITH NEUROGENIC CLAUDICATION: Status: RESOLVED | Noted: 2024-01-29 | Resolved: 2024-08-03

## 2024-08-03 PROCEDURE — 99214 OFFICE O/P EST MOD 30 MIN: CPT | Performed by: FAMILY MEDICINE

## 2024-08-03 RX ORDER — ASPIRIN 81 MG/1
TABLET ORAL
COMMUNITY
Start: 2021-10-14

## 2024-08-03 RX ORDER — SIMVASTATIN 20 MG
TABLET ORAL
Qty: 90 TABLET | Refills: 3 | Status: SHIPPED | OUTPATIENT
Start: 2024-08-03

## 2024-08-03 RX ORDER — MELOXICAM 15 MG/1
15 TABLET ORAL DAILY
Qty: 90 TABLET | Refills: 3 | Status: SHIPPED | OUTPATIENT
Start: 2024-08-03

## 2024-08-03 RX ORDER — ALBUTEROL SULFATE 90 UG/1
AEROSOL, METERED RESPIRATORY (INHALATION)
COMMUNITY
Start: 2024-05-14 | End: 2024-08-03

## 2024-08-03 RX ORDER — FLUTICASONE FUROATE, UMECLIDINIUM BROMIDE AND VILANTEROL TRIFENATATE 200; 62.5; 25 UG/1; UG/1; UG/1
1 POWDER RESPIRATORY (INHALATION) DAILY
Qty: 3 EACH | Refills: 3 | Status: SHIPPED | OUTPATIENT
Start: 2024-08-03

## 2024-08-03 RX ORDER — FLUTICASONE FUROATE, UMECLIDINIUM BROMIDE AND VILANTEROL TRIFENATATE 200; 62.5; 25 UG/1; UG/1; UG/1
1 POWDER RESPIRATORY (INHALATION) DAILY
COMMUNITY
End: 2024-08-03

## 2024-08-03 RX ORDER — ALBUTEROL SULFATE 90 UG/1
2 AEROSOL, METERED RESPIRATORY (INHALATION) EVERY 6 HOURS PRN
Qty: 1 EACH | Refills: 0 | Status: SHIPPED | OUTPATIENT
Start: 2024-08-03

## 2024-08-03 RX ORDER — FINASTERIDE 5 MG/1
5 TABLET, FILM COATED ORAL DAILY
Qty: 90 TABLET | Refills: 3 | Status: SHIPPED | OUTPATIENT
Start: 2024-08-03

## 2024-08-03 RX ORDER — METOPROLOL SUCCINATE 25 MG/1
25 TABLET, EXTENDED RELEASE ORAL DAILY
Qty: 90 TABLET | Refills: 3 | Status: SHIPPED | OUTPATIENT
Start: 2024-08-03

## 2024-08-03 NOTE — PROGRESS NOTES
Continues to fall with low blood pressure.  Saw his cardiologist in the spring.  They did not remove or adjust the metoprolol any lower.  They are wondering about hypothyroidism.  He does feel cold all the time.  Although he is anxious.  They know that the dizziness could be a problem with thyroid as well.  He is only sleeping about an hour at a time and then he wakes up.    He needs his medications refilled.  He asked for them to be printed.    He does have history of coronary artery disease which increases the need for metoprolol.    His daughter has low blood pressure as well however.    PAST MEDICAL HISTORY:  Past Medical History:    Adverse drug reaction    Age-related cataract of both eyes    Arthritis    Asthma (HCC)    Back pain    Back problem    Benign neoplasm of colon    BPH (benign prostatic hyperplasia)    Calculus of kidney    COPD (chronic obstructive pulmonary disease) (HCC)    NO HOME O2    Coronary atherosclerosis    Diverticulosis of colon (without mention of hemorrhage)    Easy bruising    Esophageal reflux    Flatulence/gas pain/belching    Frequent urination    Frequent use of laxatives    Heartburn    Hemorrhoids    High blood pressure    High cholesterol    History of cardiac murmur    Hx of heart artery stent    Prostate nodule    Had biopsy 2016    Stented coronary artery    one    Unspecified hemorrhoids without mention of complication    Visual impairment    READING GLASSES    Wears glasses    Wheezing     PAST SURGICAL HISTORY:  Past Surgical History:   Procedure Laterality Date    Angioplasty (coronary)  05/2011    Appendectomy  1949    Cataract Bilateral 11/2021    cataract removed    Colonoscopy  11/2012    + polyps    Insert intracoronary stent      Other surgical history  02/02/2022    Cystoscopy Dr. Olmos     Other surgical history  01/29/2024    LUMBAR 3 AND LUMBAR 4 LAMINECTOMIES WITH MEDIAL FACETECTOMIES    Tonsillectomy      Upper gi endoscopy - referral  03/22/2012     esophagitis, repeat in 5 year     MEDICATIONS:  Current Outpatient Medications   Medication Sig Dispense Refill    aspirin (EQ ASPIRIN ADULT LOW DOSE) 81 MG Oral Tab EC Aspirin 81 MG Oral Tab, [RxNorm: 965228]      finasteride 5 MG Oral Tab Take 1 tablet (5 mg total) by mouth daily. 90 tablet 3    metoprolol succinate ER 25 MG Oral Tablet 24 Hr Take 1 tablet (25 mg total) by mouth daily. 90 tablet 3    simvastatin 20 MG Oral Tab TAKE 1 TABLET BY MOUTH EVERY NIGHT AT BEDTIME 90 tablet 3    albuterol 108 (90 Base) MCG/ACT Inhalation Aero Soln Inhale 2 puffs into the lungs every 6 (six) hours as needed for Wheezing. 1 each 0    Meloxicam 15 MG Oral Tab Take 1 tablet (15 mg total) by mouth daily. 90 tablet 3    TRELEGY ELLIPTA 200-62.5-25 MCG/ACT Inhalation Aerosol Powder, Breath Activated Inhale 1 puff into the lungs daily. 3 each 3    Tadalafil (CIALIS) 20 MG Oral Tab Take 1 tablet (20 mg total) by mouth daily as needed for Erectile Dysfunction. 30 tablet 5    acetaminophen 325 MG Oral Tab Take 1 tablet (325 mg total) by mouth every 6 (six) hours as needed for Pain.      Sildenafil Citrate 100 MG Oral Tab Take 1 tablet (100 mg total) by mouth as needed for Erectile Dysfunction. 4 tablet 11     ALLERGIES:   Patient has no known allergies.  FAMILY HISTORY  Family History   Problem Relation Age of Onset    Breast Cancer Sister     Other (CABG) Father         x4    Other (esophogeal reflux) Mother     Other (hip replacement) Mother        PHYSICAL EXAM:  BP (!) 88/50 (BP Location: Right arm, Patient Position: Sitting, Cuff Size: adult)   Pulse 52   Ht 6' (1.829 m)   Wt 165 lb (74.8 kg)   SpO2 95%   BMI 22.38 kg/m²     Heart regular rate and rhythm.  Systolic ejection murmur loudest at the right upper sternal border.  Neck without bruit.  Extremities 2+ pulses no edema.    He last had a TSH level done 2 years ago which was normal.    ASSESSMENT/PLAN:    1. Benign essential HTN  I am concerned that the combination of  sertraline plus metoprolol is causing the hypotension.  We are going to wean off of sertraline 25 mg nightly for the next 7 days then every other day for 3 doses then discontinue.  I have asked him to message me on the 14th with an update on his blood pressure  - Comp Metabolic Panel (14)    2. Stage 3a chronic kidney disease (HCC)    - Comp Metabolic Panel (14)    3. Anxiety  He will likely need something else for anxiety once his blood pressure is better and he is fully off of the sertraline  - TSH and Free T4    4. Hypercholesterolemia    - Lipid Panel  - Comp Metabolic Panel (14)  - simvastatin 20 MG Oral Tab; TAKE 1 TABLET BY MOUTH EVERY NIGHT AT BEDTIME  Dispense: 90 tablet; Refill: 3    5. Falling  Likely due to hypotension  - TSH and Free T4  - CBC With Differential With Platelet    6. Cold intolerance    - TSH and Free T4    7. Hypotension due to drugs  See #1    8. BPH with obstruction/lower urinary tract symptoms    - finasteride 5 MG Oral Tab; Take 1 tablet (5 mg total) by mouth daily.  Dispense: 90 tablet; Refill: 3      10. Arthritis of lumbar spine  Takes meloxicam 15 mg daily  - Pain Management Referral - In Network     Face-to-face time 1002 to 1035 with documentation through 1038    If this note is coded by time based on the Office/Outpatient Evaluation and Management Codes effective January 1, 2021, the time includes reviewing the chart before entering the exam room, the time spent with the patient in face to face discussion and examination, and the time documenting the visit.  It may also include time ordering tests or reviewing test results completed on the same day.

## 2024-08-03 NOTE — PATIENT INSTRUCTIONS
Sertraline 50,mg tablets:  Take 1/2 tablet nightly for 7 doses, then 1/2 tablet every other night for 3 doses, then stop.  Blood pressure should start to go back up.  No sleeping meds for now.

## 2024-08-05 ENCOUNTER — LAB ENCOUNTER (OUTPATIENT)
Dept: LAB | Age: 79
End: 2024-08-05
Attending: FAMILY MEDICINE
Payer: MEDICARE

## 2024-08-05 LAB
ALBUMIN SERPL-MCNC: 4.5 G/DL (ref 3.2–4.8)
ALBUMIN/GLOB SERPL: 1.2 {RATIO} (ref 1–2)
ALP LIVER SERPL-CCNC: 81 U/L
ALT SERPL-CCNC: 23 U/L
ANION GAP SERPL CALC-SCNC: 6 MMOL/L (ref 0–18)
AST SERPL-CCNC: 22 U/L (ref ?–34)
BASOPHILS # BLD AUTO: 0.04 X10(3) UL (ref 0–0.2)
BASOPHILS NFR BLD AUTO: 0.4 %
BILIRUB SERPL-MCNC: 0.3 MG/DL (ref 0.2–1.1)
BUN BLD-MCNC: 24 MG/DL (ref 9–23)
CALCIUM BLD-MCNC: 10.2 MG/DL (ref 8.7–10.4)
CHLORIDE SERPL-SCNC: 102 MMOL/L (ref 98–112)
CHOLEST SERPL-MCNC: 147 MG/DL (ref ?–200)
CO2 SERPL-SCNC: 26 MMOL/L (ref 21–32)
CREAT BLD-MCNC: 1.31 MG/DL
EGFRCR SERPLBLD CKD-EPI 2021: 55 ML/MIN/1.73M2 (ref 60–?)
EOSINOPHIL # BLD AUTO: 0.35 X10(3) UL (ref 0–0.7)
EOSINOPHIL NFR BLD AUTO: 3.9 %
ERYTHROCYTE [DISTWIDTH] IN BLOOD BY AUTOMATED COUNT: 12.4 %
FASTING PATIENT LIPID ANSWER: YES
FASTING STATUS PATIENT QL REPORTED: YES
GLOBULIN PLAS-MCNC: 3.7 G/DL (ref 2–3.5)
GLUCOSE BLD-MCNC: 80 MG/DL (ref 70–99)
HCT VFR BLD AUTO: 36.9 %
HDLC SERPL-MCNC: 46 MG/DL (ref 40–59)
HGB BLD-MCNC: 11.7 G/DL
IMM GRANULOCYTES # BLD AUTO: 0.05 X10(3) UL (ref 0–1)
IMM GRANULOCYTES NFR BLD: 0.6 %
LDLC SERPL CALC-MCNC: 85 MG/DL (ref ?–100)
LYMPHOCYTES # BLD AUTO: 0.71 X10(3) UL (ref 1–4)
LYMPHOCYTES NFR BLD AUTO: 7.8 %
MCH RBC QN AUTO: 31.5 PG (ref 26–34)
MCHC RBC AUTO-ENTMCNC: 31.7 G/DL (ref 31–37)
MCV RBC AUTO: 99.2 FL
MONOCYTES # BLD AUTO: 0.71 X10(3) UL (ref 0.1–1)
MONOCYTES NFR BLD AUTO: 7.8 %
NEUTROPHILS # BLD AUTO: 7.19 X10 (3) UL (ref 1.5–7.7)
NEUTROPHILS # BLD AUTO: 7.19 X10(3) UL (ref 1.5–7.7)
NEUTROPHILS NFR BLD AUTO: 79.5 %
NONHDLC SERPL-MCNC: 101 MG/DL (ref ?–130)
OSMOLALITY SERPL CALC.SUM OF ELEC: 281 MOSM/KG (ref 275–295)
PLATELET # BLD AUTO: 345 10(3)UL (ref 150–450)
POTASSIUM SERPL-SCNC: 4.9 MMOL/L (ref 3.5–5.1)
PROT SERPL-MCNC: 8.2 G/DL (ref 5.7–8.2)
RBC # BLD AUTO: 3.72 X10(6)UL
SODIUM SERPL-SCNC: 134 MMOL/L (ref 136–145)
T4 FREE SERPL-MCNC: 1.3 NG/DL (ref 0.8–1.7)
TRIGL SERPL-MCNC: 81 MG/DL (ref 30–149)
TSI SER-ACNC: 4.72 MIU/ML (ref 0.55–4.78)
VLDLC SERPL CALC-MCNC: 13 MG/DL (ref 0–30)
WBC # BLD AUTO: 9.1 X10(3) UL (ref 4–11)

## 2024-08-05 PROCEDURE — 84439 ASSAY OF FREE THYROXINE: CPT | Performed by: FAMILY MEDICINE

## 2024-08-05 PROCEDURE — 80061 LIPID PANEL: CPT | Performed by: FAMILY MEDICINE

## 2024-08-05 PROCEDURE — 85025 COMPLETE CBC W/AUTO DIFF WBC: CPT | Performed by: FAMILY MEDICINE

## 2024-08-05 PROCEDURE — 80053 COMPREHEN METABOLIC PANEL: CPT | Performed by: FAMILY MEDICINE

## 2024-08-05 PROCEDURE — 36415 COLL VENOUS BLD VENIPUNCTURE: CPT | Performed by: FAMILY MEDICINE

## 2024-08-05 PROCEDURE — 84443 ASSAY THYROID STIM HORMONE: CPT | Performed by: FAMILY MEDICINE

## 2024-08-13 DIAGNOSIS — R06.09 DYSPNEA ON EXERTION: Primary | ICD-10-CM

## 2024-08-26 ENCOUNTER — ANESTHESIA EVENT (OUTPATIENT)
Dept: ENDOSCOPY | Facility: HOSPITAL | Age: 79
End: 2024-08-26
Payer: MEDICARE

## 2024-08-26 ENCOUNTER — APPOINTMENT (OUTPATIENT)
Dept: GENERAL RADIOLOGY | Facility: HOSPITAL | Age: 79
End: 2024-08-26
Attending: INTERNAL MEDICINE
Payer: MEDICARE

## 2024-08-26 ENCOUNTER — ANESTHESIA (OUTPATIENT)
Dept: ENDOSCOPY | Facility: HOSPITAL | Age: 79
End: 2024-08-26
Payer: MEDICARE

## 2024-08-26 ENCOUNTER — HOSPITAL ENCOUNTER (OUTPATIENT)
Facility: HOSPITAL | Age: 79
Setting detail: HOSPITAL OUTPATIENT SURGERY
Discharge: HOME | End: 2024-08-26
Attending: INTERNAL MEDICINE | Admitting: INTERNAL MEDICINE
Payer: MEDICARE

## 2024-08-26 VITALS
SYSTOLIC BLOOD PRESSURE: 111 MMHG | HEART RATE: 86 BPM | HEIGHT: 72 IN | BODY MASS INDEX: 22.35 KG/M2 | OXYGEN SATURATION: 94 % | DIASTOLIC BLOOD PRESSURE: 60 MMHG | TEMPERATURE: 101 F | WEIGHT: 165 LBS | RESPIRATION RATE: 14 BRPM

## 2024-08-26 DIAGNOSIS — T17.908A ASPIRATION INTO AIRWAY, INITIAL ENCOUNTER: Primary | ICD-10-CM

## 2024-08-26 LAB
BASOPHILS NFR BRONCH: 0 %
EOSINOPHIL NFR BRONCH: 0 %
LYMPHOCYTES NFR BRONCH: 2 %
MONOS+MACROS NFR BRONCH: 15 %
NEUTROPHILS NFR BRONCH: 83 %
TOTAL CELLS COUNTED FLD: 100

## 2024-08-26 PROCEDURE — 88341 IMHCHEM/IMCYTCHM EA ADD ANTB: CPT | Performed by: INTERNAL MEDICINE

## 2024-08-26 PROCEDURE — 87305 ASPERGILLUS AG IA: CPT | Performed by: INTERNAL MEDICINE

## 2024-08-26 PROCEDURE — 87077 CULTURE AEROBIC IDENTIFY: CPT | Performed by: INTERNAL MEDICINE

## 2024-08-26 PROCEDURE — 88104 CYTOPATH FL NONGYN SMEARS: CPT | Performed by: INTERNAL MEDICINE

## 2024-08-26 PROCEDURE — 88312 SPECIAL STAINS GROUP 1: CPT | Performed by: INTERNAL MEDICINE

## 2024-08-26 PROCEDURE — 87116 MYCOBACTERIA CULTURE: CPT | Performed by: INTERNAL MEDICINE

## 2024-08-26 PROCEDURE — 87071 CULTURE AEROBIC QUANT OTHER: CPT | Performed by: INTERNAL MEDICINE

## 2024-08-26 PROCEDURE — 87102 FUNGUS ISOLATION CULTURE: CPT | Performed by: INTERNAL MEDICINE

## 2024-08-26 PROCEDURE — 87798 DETECT AGENT NOS DNA AMP: CPT | Performed by: INTERNAL MEDICINE

## 2024-08-26 PROCEDURE — 87206 SMEAR FLUORESCENT/ACID STAI: CPT | Performed by: INTERNAL MEDICINE

## 2024-08-26 PROCEDURE — 87205 SMEAR GRAM STAIN: CPT | Performed by: INTERNAL MEDICINE

## 2024-08-26 PROCEDURE — 88305 TISSUE EXAM BY PATHOLOGIST: CPT | Performed by: INTERNAL MEDICINE

## 2024-08-26 PROCEDURE — 89050 BODY FLUID CELL COUNT: CPT | Performed by: INTERNAL MEDICINE

## 2024-08-26 PROCEDURE — 0B9J8ZX DRAINAGE OF LEFT LOWER LUNG LOBE, VIA NATURAL OR ARTIFICIAL OPENING ENDOSCOPIC, DIAGNOSTIC: ICD-10-PCS | Performed by: INTERNAL MEDICINE

## 2024-08-26 PROCEDURE — 71045 X-RAY EXAM CHEST 1 VIEW: CPT | Performed by: INTERNAL MEDICINE

## 2024-08-26 PROCEDURE — 89051 BODY FLUID CELL COUNT: CPT | Performed by: INTERNAL MEDICINE

## 2024-08-26 PROCEDURE — 0BBJ8ZX EXCISION OF LEFT LOWER LUNG LOBE, VIA NATURAL OR ARTIFICIAL OPENING ENDOSCOPIC, DIAGNOSTIC: ICD-10-PCS | Performed by: INTERNAL MEDICINE

## 2024-08-26 PROCEDURE — 88342 IMHCHEM/IMCYTCHM 1ST ANTB: CPT | Performed by: INTERNAL MEDICINE

## 2024-08-26 PROCEDURE — 0BDJ8ZX EXTRACTION OF LEFT LOWER LUNG LOBE, VIA NATURAL OR ARTIFICIAL OPENING ENDOSCOPIC, DIAGNOSTIC: ICD-10-PCS | Performed by: INTERNAL MEDICINE

## 2024-08-26 RX ORDER — LIDOCAINE HYDROCHLORIDE 10 MG/ML
INJECTION, SOLUTION EPIDURAL; INFILTRATION; INTRACAUDAL; PERINEURAL AS NEEDED
Status: DISCONTINUED | OUTPATIENT
Start: 2024-08-26 | End: 2024-08-26 | Stop reason: SURG

## 2024-08-26 RX ORDER — SODIUM CHLORIDE, SODIUM LACTATE, POTASSIUM CHLORIDE, CALCIUM CHLORIDE 600; 310; 30; 20 MG/100ML; MG/100ML; MG/100ML; MG/100ML
INJECTION, SOLUTION INTRAVENOUS CONTINUOUS
Status: DISCONTINUED | OUTPATIENT
Start: 2024-08-26 | End: 2024-08-26

## 2024-08-26 RX ORDER — NALOXONE HYDROCHLORIDE 0.4 MG/ML
0.08 INJECTION, SOLUTION INTRAMUSCULAR; INTRAVENOUS; SUBCUTANEOUS ONCE AS NEEDED
Status: DISCONTINUED | OUTPATIENT
Start: 2024-08-26 | End: 2024-08-26

## 2024-08-26 RX ORDER — LIDOCAINE HYDROCHLORIDE 20 MG/ML
INJECTION, SOLUTION INFILTRATION; PERINEURAL
Status: DISCONTINUED | OUTPATIENT
Start: 2024-08-26 | End: 2024-08-26

## 2024-08-26 RX ADMIN — SODIUM CHLORIDE, SODIUM LACTATE, POTASSIUM CHLORIDE, CALCIUM CHLORIDE: 600; 310; 30; 20 INJECTION, SOLUTION INTRAVENOUS at 10:38:00

## 2024-08-26 RX ADMIN — LIDOCAINE HYDROCHLORIDE 50 MG: 10 INJECTION, SOLUTION EPIDURAL; INFILTRATION; INTRACAUDAL; PERINEURAL at 10:42:00

## 2024-08-26 RX ADMIN — SODIUM CHLORIDE, SODIUM LACTATE, POTASSIUM CHLORIDE, CALCIUM CHLORIDE: 600; 310; 30; 20 INJECTION, SOLUTION INTRAVENOUS at 11:02:00

## 2024-08-26 NOTE — ANESTHESIA PREPROCEDURE EVALUATION
PRE-OP EVALUATION    Patient Name: Dev Johnston    Admit Diagnosis: PNEUMONIA OF LEFT LOWER LOBE    Pre-op Diagnosis: PNEUMONIA OF LEFT LOWER LOBE    BRONCHOSCOPY WITH BRONCHIAL ALVEOLAR LAVAGE AND  TRANSBRONCHIAL BIOPSY UNDER MAC WITH FLUOROSCOPY GUIDANCE    Anesthesia Procedure: BRONCHOSCOPY WITH BRONCHIAL ALVEOLAR LAVAGE AND  TRANSBRONCHIAL BIOPSY UNDER MAC WITH FLUOROSCOPY GUIDANCE    Surgeons and Role:     * Sheri Johnson MD - Primary    Pre-op vitals reviewed.  Temp: 100.9 °F (38.3 °C)  Pulse: 85  Resp: 16  BP: 140/72  SpO2: 96 %  Body mass index is 22.38 kg/m².    Current medications reviewed.  Hospital Medications:  • lactated ringers infusion   Intravenous Continuous       Outpatient Medications:     Medications Prior to Admission   Medication Sig Dispense Refill Last Dose   • aspirin (EQ ASPIRIN ADULT LOW DOSE) 81 MG Oral Tab EC Aspirin 81 MG Oral Tab, [RxNorm: 915671]   8/24/2024   • finasteride 5 MG Oral Tab Take 1 tablet (5 mg total) by mouth daily. 90 tablet 3 Past Week   • metoprolol succinate ER 25 MG Oral Tablet 24 Hr Take 1 tablet (25 mg total) by mouth daily. 90 tablet 3 Past Week   • simvastatin 20 MG Oral Tab TAKE 1 TABLET BY MOUTH EVERY NIGHT AT BEDTIME 90 tablet 3 Past Week   • albuterol 108 (90 Base) MCG/ACT Inhalation Aero Soln Inhale 2 puffs into the lungs every 6 (six) hours as needed for Wheezing. 1 each 0 Past Week   • Meloxicam 15 MG Oral Tab Take 1 tablet (15 mg total) by mouth daily. 90 tablet 3 Past Week   • TRELEGY ELLIPTA 200-62.5-25 MCG/ACT Inhalation Aerosol Powder, Breath Activated Inhale 1 puff into the lungs daily. 3 each 3 Past Week   • acetaminophen 325 MG Oral Tab Take 1 tablet (325 mg total) by mouth every 6 (six) hours as needed for Pain.      • Sildenafil Citrate 100 MG Oral Tab Take 1 tablet (100 mg total) by mouth as needed for Erectile Dysfunction. 4 tablet 11        Allergies: Patient has no known allergies.      Anesthesia Evaluation    Patient  summary reviewed.    Anesthetic Complications           GI/Hepatic/Renal      (+) GERD                           Cardiovascular                  (+) hypertension     (+) CAD                                Endo/Other                                  Pulmonary      (+) asthma  (+) COPD                   Neuro/Psych                 (+) neuromuscular disease                   Past Surgical History:   Procedure Laterality Date   • Angioplasty (coronary)  2011   • Appendectomy  1949   • Cataract Bilateral 2021    cataract removed   • Colonoscopy  2012    + polyps   • Insert intracoronary stent     • Other surgical history  2022    Cystoscopy Dr. Olmos    • Other surgical history  2024    LUMBAR 3 AND LUMBAR 4 LAMINECTOMIES WITH MEDIAL FACETECTOMIES   • Tonsillectomy     • Upper gi endoscopy - referral  2012    esophagitis, repeat in 5 year     Social History     Socioeconomic History   • Marital status: Life Partner   Tobacco Use   • Smoking status: Former     Current packs/day: 0.00     Average packs/day: 1 pack/day for 43.3 years (43.3 ttl pk-yrs)     Types: Cigarettes     Start date: 1968     Quit date: 2011     Years since quittin.3   • Smokeless tobacco: Never   Vaping Use   • Vaping status: Never Used   Substance and Sexual Activity   • Alcohol use: No     Comment: Quit in . Prior had been a heavier drinker.   • Drug use: Not Currently   Other Topics Concern   • Caffeine Concern Yes   • Exercise Yes     Comment: 6x/week     History   Drug Use Unknown     Available pre-op labs reviewed.  Lab Results   Component Value Date    WBC 9.1 2024    RBC 3.72 (L) 2024    HGB 11.7 (L) 2024    HCT 36.9 (L) 2024    MCV 99.2 2024    MCH 31.5 2024    MCHC 31.7 2024    RDW 12.4 2024    .0 2024     Lab Results   Component Value Date     (L) 2024    K 4.9 2024     2024    CO2 26.0 2024    BUN  24 (H) 08/05/2024    CREATSERUM 1.31 (H) 08/05/2024    GLU 80 08/05/2024    CA 10.2 08/05/2024            Airway      Mallampati: I  Mouth opening: >3 FB  TM distance: > 6 cm  Neck ROM: full Cardiovascular    Cardiovascular exam normal.  Rhythm: regular  Rate: normal     Dental             Pulmonary    Pulmonary exam normal.                 Other findings          ASA: 3   Plan: MAC  NPO status verified and patient meets guidelines.          Plan/risks discussed with: patient              Present on Admission:  **None**

## 2024-08-26 NOTE — DISCHARGE INSTRUCTIONS
Home Care Instructions Following Bronchoscopy     Diet:  Prior to your examination, a local anesthetic was used to numb the back of your throat. Do not eat or drink for two hours (1:00pm)   Sip fluids initially and advance to your regular diet as tolerated.  Do not drink alcohol today.    Medication:  If you have questions about resuming your normal medications, please contact your Primary Care Physician.    Activities:  Do not drive today.  Do not operate any machinery today (including kitchen equipment).    What to Expect:  A sore throat  A cough  Hoarseness  A small amount of blood in your sputum    Contact Your Doctor If:  You have difficulty breathing  You have chest pain  You have a fever greater than 102°F  You cough up more than a few tablespoons of blood in your sputum    **If unable to reach your doctor, please go to the [facility] Emergency Room**    - Your referring physician will receive a full report of your examination.  - Please contact your physician’s office within one week for results if appointment not scheduled.

## 2024-08-26 NOTE — ANESTHESIA POSTPROCEDURE EVALUATION
[facility]    Dev Johnston Patient Status:  Hospital Outpatient Surgery   Age/Gender 79 year old male MRN [id number]   Location [facility] END[facility]COPY [facility] Attending Sheri Johnson MD   Hosp Day # 0 PCP Cedric Dixon MD       Anesthesia Post-op Note    BRONCHOSCOPY WITH BRONCHIAL ALVEOLAR LAVAGE, CYTOLOGY BRUSHING AND TRANSBRONCHIAL BIOPSY UNDER MAC WITH FLUOROSCOPY GUIDANCE    Procedure Summary       Date: 08/26/24 Room / Location:  ENDOSCOPY 04 /  ENDOSCOPY    Anesthesia Start: 1038 Anesthesia Stop:     Procedure: BRONCHOSCOPY WITH BRONCHIAL ALVEOLAR LAVAGE, CYTOLOGY BRUSHING AND TRANSBRONCHIAL BIOPSY UNDER MAC WITH FLUOROSCOPY GUIDANCE Diagnosis: (PNEUMONIA OF LEFT LOWER LOBE)    Surgeons: Sheri Johnson MD Anesthesiologist: Wilfredo Robledo MD    Anesthesia Type: MAC ASA Status: 3            Anesthesia Type: MAC    Vitals Value Taken Time   /84 08/26/24 1113   Temp MAC 08/26/24 1113   Pulse 98 08/26/24 1113   Resp 20 08/26/24 1113   SpO2 93 08/26/24 1113       Patient Location: Endoscopy    Anesthesia Type: MAC    Airway Patency: patent    Postop Pain Control: adequate    Mental Status: mildly sedated but able to meaningfully participate in the post-anesthesia evaluation    Nausea/Vomiting: none    Cardiopulmonary/Hydration status: stable euvolemic    Complications: no apparent anesthesia related complications    Postop vital signs: stable    Dental Exam: Unchanged from Preop

## 2024-08-26 NOTE — OPERATIVE REPORT
Bronchoscopy Procedure Report     Preop diagnosis:       abnormal CT  Postop diagnosis:      abnormal CT  Procedure performed: Bronchoscopy, Diagnostic  Bronchoalveolar lavage, BAL  [place] biopsy  Transbronchial biopsy     Sedation used:          MAC, topical lidocaine     Description of procedure: Informed consent was obtained. Oxygen applied via facemask.  Bronchoscope was inserted via oral route.  Normal vocal cord movement was noted. Trachea appeared tortuous with excessive dynamic airway collapse. Germaine appeared sharp.  Mucous membranes appeared edematous and friable. There were thick white secretions eminating from the left lower lobe.      Left and right lung were inspected to the subsegmental level.  Within the LLL there was endobronchial occlusion with kernel of corn and mucus noted.  This was aspirated and removed.      BAL of LLL completed.  100cc of sterile saline instilled, 10cc of blood tinged fluid aspirated back  Bronchial brushings completed in LLL  Transbronchial biopsies of LLLl completed     Samples obtained:                   BAL of LLL                  Transbronchial biopsy of LLL                  Bronchial brushing of LLL     Post procedure CXR necessary? yes  Follow up:       cultures, pathology, speech eval as pt with signs c/w aspiration      Patient tolerated the procedure well and was transferred to the recovery area. EBL was 10cc.

## 2024-08-26 NOTE — H&P
[facility]    Dev Johnston Patient Status:  Hospital Outpatient Surgery    1945 MRN [id number]   Location [facility] ENDOSCOPY [facility]IN [facility] Attending Sheri Johnson MD   Hosp Day # 0 PCP Cedric Dixon MD     Date of Admission: 2024  9:07 AM  Admission Diagnosis: PNEUMONIA OF LEFT LOWER LOBE  Reason for Consult: abnormal CT chest     History of Present Illness: 79 year old male born 1945 with history of CAD, paralyzed hemidiaphragm, asthma, post nasal drip who presents in follow up. Following last visit pt had CTA completed without PE but with emphysema as well as elevated left hemidiphragm and subtotal consolidation of the LLL with hypodense regions within the consolidation concerning for necrosis/abscess vs malignancy. Pt continues to have significant dyspnea on exertion as well as cough with brown sputum. He denies fevers, but continues to have hypotension and dizziness intermittently. He remains on trelegy 200, did not feel improvement with the SOB with the prednisone burst.      Past Medical History:    Adverse drug reaction    Age-related cataract of both eyes    Arthritis    Asthma (HCC)    Back pain    Back problem    Benign neoplasm of colon    BPH (benign prostatic hyperplasia)    Calculus of kidney    COPD (chronic obstructive pulmonary disease) (HCC)    NO HOME O2    Coronary atherosclerosis    Diverticulosis of colon (without mention of hemorrhage)    Easy bruising    Esophageal reflux    Flatulence/gas pain/belching    Frequent urination    Frequent use of laxatives    Heartburn    Hemorrhoids    High blood pressure    High cholesterol    History of cardiac murmur    Hx of heart artery stent    Prostate nodule    Had biopsy 2016    Stented coronary artery    one    Unspecified hemorrhoids without mention of complication    Visual impairment    READING GLASSES    Wears glasses    Wheezing      Past Surgical History:   Procedure Laterality Date    Angioplasty  (coronary)  05/2011    Appendectomy  1949    Cataract Bilateral 11/2021    cataract removed    Colonoscopy  11/2012    + polyps    Insert intracoronary stent      Other surgical history  02/02/2022    Cystoscopy Dr. Olmos     Other surgical history  01/29/2024    LUMBAR 3 AND LUMBAR 4 LAMINECTOMIES WITH MEDIAL FACETECTOMIES    Tonsillectomy      Upper gi endoscopy - referral  03/22/2012    esophagitis, repeat in 5 year      No Known Allergies     Social History:   reports that he quit smoking about 13 years ago. His smoking use included cigarettes. He started smoking about 56 years ago. He has a 43.3 pack-year smoking history. He has never used smokeless tobacco. He reports that he does not currently use drugs. He reports that he does not drink alcohol.      Family History:  Family History   Problem Relation Age of Onset    Breast Cancer Sister     Other (CABG) Father         x4    Other (esophogeal reflux) Mother     Other (hip replacement) Mother          Home Medications:  Outpatient Medications Marked as Taking for the 8/26/24 encounter (Hospital Encounter)   Medication Sig Dispense Refill    aspirin (EQ ASPIRIN ADULT LOW DOSE) 81 MG Oral Tab EC Aspirin 81 MG Oral Tab, [RxNorm: 057950]      finasteride 5 MG Oral Tab Take 1 tablet (5 mg total) by mouth daily. 90 tablet 3    metoprolol succinate ER 25 MG Oral Tablet 24 Hr Take 1 tablet (25 mg total) by mouth daily. 90 tablet 3    simvastatin 20 MG Oral Tab TAKE 1 TABLET BY MOUTH EVERY NIGHT AT BEDTIME 90 tablet 3    albuterol 108 (90 Base) MCG/ACT Inhalation Aero Soln Inhale 2 puffs into the lungs every 6 (six) hours as needed for Wheezing. 1 each 0    Meloxicam 15 MG Oral Tab Take 1 tablet (15 mg total) by mouth daily. 90 tablet 3    TRELEGY ELLIPTA 200-62.5-25 MCG/ACT Inhalation Aerosol Powder, Breath Activated Inhale 1 puff into the lungs daily. 3 each 3    acetaminophen 325 MG Oral Tab Take 1 tablet (325 mg total) by mouth every 6 (six) hours as needed for  Pain.      Sildenafil Citrate 100 MG Oral Tab Take 1 tablet (100 mg total) by mouth as needed for Erectile Dysfunction. 4 tablet 11        Current Medications:    Current Facility-Administered Medications:     lactated ringers infusion, , Intravenous, Continuous    lidocaine (Xylocaine) 2 % injection, , , PRN     Review of Systems:  Constitutional: denies weight loss, +fevers, fatigue  HEENT: denies vision or hearing changes, eye pain, tinnitus, hearing loss, sore throat, epistaxis, sinus congestion  Cardiovascular: denies chest pain, PND, palpitations, edema, orthopnea, or syncope  Respiratory: + SOB, dyspnea on exertion,  cough,  GI: denies nausea, vomiting, diarrhea, constipation, melena, abdominal pain  : denies hematuria, dysuria, hesitancy, or incontinence  Musculoskeletal: denies arthralgias, myalgias, muscle weakness, or joint swelling  Skin: denies rash or pruritis; no jaundice  Neurologic: denies numbness, weakness, ataxia, tremors, or vertigo  Psychiatric: denies insomnia, depression, anxiety, or drug abuse  Endocrine: denies polydypsia, polyuria, cold/heat intolerance  Hematologic/lymphatic: denies easy bruising, new blood clots, or lymphedema  Allergic/immunologic: denies hay fever, hives, or new allergies       OBJECTIVE:  Patient Vitals for the past 24 hrs:   BP Temp Temp src Pulse Resp SpO2 Height Weight   08/26/24 0945 -- (!) 100.9 °F (38.3 °C) Oral -- -- -- -- --   08/26/24 0931 140/72 97.6 °F (36.4 °C) Temporal 85 16 96 % 72\" 165 lb (74.8 kg)     O2 requirement: RA  Wt Readings from Last 3 Encounters:   08/26/24 165 lb (74.8 kg)   08/03/24 165 lb (74.8 kg)   05/04/24 162 lb (73.5 kg)        No intake/output data recorded.  No intake/output data recorded.     Physical Exam:                          General: alert, cooperative, in NAD                          HEENT: oropharynx clear without erythema or exudates, moist mucous membranes                          Lungs: left basilar crackles                            Chest wall: No tenderness or deformity.                          Heart: Regular rate and rhythm, normal S1S2                          Abdomen: soft, non-tender, non-distended, positive BS.                          Extremity: No clubbing or cyanosis. no edema                          Skin: No rashes or lesions.         Lab Results   Component Value Date    INR 1.07 01/12/2024    INR 1.04 11/28/2023    INR 1.12 10/06/2023          Imaging: I have independently visualized all relevant chest imaging in PACS.  I agree with the radiology interpretation except where noted.       ASSESSMENT/PLAN:  LLL consolidation  -CTA with subtotal consolidation of the LLL with hypodense regions within the consolidation concerning for necrosis/abscess vs malignancy  -on augmentin BID   -recommend proceeding with bronchoscopy with BAL, brushing, and TBBx  -The rationale for performing the bronchoscopy, including risks and benefits were explained to the patient and questions were answered. The risks of drug reactions, bleeding, collapsed lung, fevers, and hoarseness were discussed, along with other less common complications including death. The patients understands the procedure and agrees to proceed.     hSeri Johnson MD  8/26/2024  10:25 AM

## 2024-08-27 LAB — NON GYNE INTERPRETATION: NEGATIVE

## 2024-08-29 LAB — ASPERGILLUS AG BAL/SERUM: 0.13 INDEX

## 2024-09-06 RX ORDER — MELOXICAM 15 MG/1
15 TABLET ORAL DAILY
Qty: 90 TABLET | Refills: 1 | Status: SHIPPED | OUTPATIENT
Start: 2024-09-06

## 2024-09-06 NOTE — TELEPHONE ENCOUNTER
Patient call and stated that he has been without his medication Meloxicam 15 MG Oral Tab for about a month now and would really need to have his medication refilled as soon as possible.    Please send script to:  [id number] - [place], [place] - [address], [phone], [phone]     Please advise

## 2024-09-06 NOTE — TELEPHONE ENCOUNTER
Pt states he is unable to fill Meloxicam due to Dr Jarrell SHANNAN # being   Script written 8/3/24  Script pended  Last visit 8/3/24 with Dr Jarrell

## 2024-10-02 ENCOUNTER — HOSPITAL ENCOUNTER (OUTPATIENT)
Dept: GENERAL RADIOLOGY | Facility: HOSPITAL | Age: 79
Discharge: HOME | End: 2024-10-02
Attending: INTERNAL MEDICINE
Payer: MEDICARE

## 2024-10-02 DIAGNOSIS — T17.908A ASPIRATION INTO AIRWAY, INITIAL ENCOUNTER: ICD-10-CM

## 2024-10-02 PROCEDURE — 74230 X-RAY XM SWLNG FUNCJ C+: CPT | Performed by: INTERNAL MEDICINE

## 2024-10-02 PROCEDURE — 92611 MOTION FLUOROSCOPY/SWALLOW: CPT

## 2024-10-02 NOTE — PROGRESS NOTES
ADULT VIDEOFLUOROSCOPIC SWALLOWING STUDY    Admission Date: 10/2/2024  Evaluation Date: 10/02/24  Radiologist: Cassia JACOBS   Diet Recommendations - Solids: Regular  Diet Recommendations - Liquids: Thin Liquids    Further Follow-up: Follow-up with PCP or referring physician as instructed.        Compensatory Strategies Recommended: Alternate consistencies  Aspiration Precautions: Upright position     Treatment Plan/Recommendations: Wound benefit from outpatient dysphagia therapy    HISTORY   Background/Objective Information: Patient is an 80 y/o male referred for VFSS d/t concern for aspiration. Patient recently had bronchoscopy completed with food debris found within the lung. Patient reported frequent globus sensation and coughing with PO intake of solids and liquids that has occurred for years. He endorsed recent pneumonia, but denied any unintended weight loss. Patient denied any prior dysphagia assessment.    Problem List  Active Problems:    * No active hospital problems. *      Past Medical History  Past Medical History:    Adverse drug reaction    Age-related cataract of both eyes    Arthritis    Asthma (HCC)    Back pain    Back problem    Benign neoplasm of colon    BPH (benign prostatic hyperplasia)    Calculus of kidney    COPD (chronic obstructive pulmonary disease) (HCC)    NO HOME O2    Coronary atherosclerosis    Diverticulosis of colon (without mention of hemorrhage)    Easy bruising    Esophageal reflux    Flatulence/gas pain/belching    Frequent urination    Frequent use of laxatives    Heartburn    Hemorrhoids    High blood pressure    High cholesterol    History of cardiac murmur    Hx of heart artery stent    Prostate nodule    Had biopsy 2016    Stented coronary artery    one    Unspecified hemorrhoids without mention of complication    Visual impairment    READING GLASSES    Wears glasses    Wheezing       Current Diet Consistency: Regular;Thin liquids        History of Recent:  Difficulty breathing  Precautions: Aspiration  Imaging results:   CXR 8/26/24  CONCLUSION:       Stable cardiac and mediastinal contours.  Confluent opacification of the left lung base may represent edema/hemorrhage associated with recent biopsy, though superimposed infection/aspiration not excluded.  A small left pleural effusion is suspected.  No   appreciable pneumothorax.         LOCATION:  [facility]                  Dictated by (CST): Arvind Villaseñor MD on 8/26/2024 at 12:18 PM       Finalized by (CST): Arvind Villaseñor MD on 8/26/2024 at 12:18 PM     Reason for Referral: R/O aspiration    Family/Patient Goals: none stated     ASSESSMENT   DYSPHAGIA ASSESSMENT  Test completed in conjunction with Radiologist.  Patient Positioned: Upright;Midline.  Patient Viewed: Lateral.   .  Consistencies Presented: Thin liquids;Puree;Hard solid to assess oropharyngeal swallow function and assess for compensatory strategies to improve safe swallow function.    THIN LIQUIDS  Method of Presentation: Cup;Straw  Oral Phase of Swallow (VFSS - Thin Liquids): Within Functional Limits  Triggered at: Pyriform sinuses (x1 w/ initial trial; more timely with subsequent trials)  Premature Spillage to: Pyriform sinuses (x1 w/ initial trial)  Residue Severity, Location: Mild;Valleculae  Laryngeal Penetration: Transient  Tracheal Aspiration: None        PUREE  Oral Phase of Swallow (VFSS - Puree): Within Functional Limits  Triggered at: Valleculae  Residue Severity, Location: Moderate;Valleculae  Cleared/Reduced with: Liquid assist  Laryngeal Penetration: None  Tracheal Aspiration: None  Strategy(ies) Implemented (Puree): Chin tuck  Effectiveness: No     HARD SOLID  Oral Phase of Swallow (VFSS - Hard Solid): Within Functional Limits  Triggered at: Valleculae  Residue Severity, Location: Moderate;Valleculae  Cleared/Reduced with: Liquid assist  Laryngeal Penetration: None  Tracheal Aspiration: None  Penetration Aspiration Scale Score: Score 2: Material  enters the airway, remains above the vocal folds, and is ejected from the airway       Overall Impression:   Patient presented with mild pharyngeal dysphagia characterized by reduced base of otngue retraction and sluggish epiglottic inversion. Oral phase appears largely intact outside of one episode of premature bolus loss with initial trial of thin liquid. Bolus acceptance was adequate without evidence of anterior bolus loss. Mastication and AP bolus transit were thorough and efficient without evidence of oral residue. Pharyngeal swallow initiated at the level of the valleculae across consistencies. Reduced base of tongue retraction resulted in mild to moderate vallecular residue which was reduced, but not eliminated with liquid wash. Use of chin tuck was ineffective in reducing pharyngeal residue. Flash penetration, but no aspiration, observed with thin liquid trial x1.    Recommend patient continue a regular diet and thin liquids. Bolus size and rate of intake should be limited. Recommend alternating solids and liquids to help reduce pharyngeal residue. Patient may benefit from outpatient dysphagia therapy for pharyngeal strengthening program. Education provided re: results and recommendations. All questions answered to apparent satisfaction.                EDUCATION/INSTRUCTION  Reviewed results and recommendations with patient/family/caregiver.  Agreement/Understanding verbalized and all questions answered to their apparent satisfaction.    INTERDISCIPLINARY COMMUNICATION  Reviewed results with Radiologist; agreement verbalized.    Patient Experiencing Pain: No                FOLLOW UP  Treatment Plan/Recommendations: Wound benefit from outpatient dysphagia therapy         Thank you for your referral.   If you have any questions, please contact GAYATHRI Torres

## 2024-10-04 ENCOUNTER — OFFICE VISIT (OUTPATIENT)
Dept: FAMILY MEDICINE CLINIC | Facility: CLINIC | Age: 79
End: 2024-10-04
Payer: MEDICARE

## 2024-10-04 VITALS
SYSTOLIC BLOOD PRESSURE: 148 MMHG | DIASTOLIC BLOOD PRESSURE: 80 MMHG | WEIGHT: 162 LBS | HEIGHT: 72 IN | RESPIRATION RATE: 16 BRPM | BODY MASS INDEX: 21.94 KG/M2 | OXYGEN SATURATION: 98 % | HEART RATE: 79 BPM

## 2024-10-04 DIAGNOSIS — M54.32 BILATERAL SCIATICA: Primary | ICD-10-CM

## 2024-10-04 DIAGNOSIS — E78.00 HYPERCHOLESTEROLEMIA: ICD-10-CM

## 2024-10-04 DIAGNOSIS — M54.31 BILATERAL SCIATICA: Primary | ICD-10-CM

## 2024-10-04 DIAGNOSIS — J43.9 PULMONARY EMPHYSEMA, UNSPECIFIED EMPHYSEMA TYPE (HCC): ICD-10-CM

## 2024-10-04 DIAGNOSIS — N13.8 BPH WITH OBSTRUCTION/LOWER URINARY TRACT SYMPTOMS: ICD-10-CM

## 2024-10-04 DIAGNOSIS — N40.1 BPH WITH OBSTRUCTION/LOWER URINARY TRACT SYMPTOMS: ICD-10-CM

## 2024-10-04 PROCEDURE — 99214 OFFICE O/P EST MOD 30 MIN: CPT | Performed by: FAMILY MEDICINE

## 2024-10-04 RX ORDER — ALBUTEROL SULFATE 90 UG/1
2 INHALANT RESPIRATORY (INHALATION) EVERY 6 HOURS PRN
Qty: 1 EACH | Refills: 3 | Status: SHIPPED | OUTPATIENT
Start: 2024-10-04

## 2024-10-04 RX ORDER — SILODOSIN 4 MG/1
CAPSULE ORAL
COMMUNITY
Start: 2024-09-11 | End: 2024-10-04

## 2024-10-04 RX ORDER — MELOXICAM 15 MG/1
15 TABLET ORAL DAILY
Qty: 90 TABLET | Refills: 1 | Status: SHIPPED | OUTPATIENT
Start: 2024-10-04

## 2024-10-04 RX ORDER — FLUTICASONE FUROATE, UMECLIDINIUM BROMIDE AND VILANTEROL TRIFENATATE 200; 62.5; 25 UG/1; UG/1; UG/1
1 POWDER RESPIRATORY (INHALATION) DAILY
Qty: 3 EACH | Refills: 3 | Status: SHIPPED | OUTPATIENT
Start: 2024-10-04

## 2024-10-04 RX ORDER — FINASTERIDE 5 MG/1
5 TABLET, FILM COATED ORAL DAILY
Qty: 90 TABLET | Refills: 3 | Status: SHIPPED | OUTPATIENT
Start: 2024-10-04

## 2024-10-04 RX ORDER — PREDNISONE 20 MG/1
TABLET ORAL
COMMUNITY
Start: 2024-08-13

## 2024-10-04 RX ORDER — NICOTINE POLACRILEX 4 MG/1
GUM, CHEWING ORAL
COMMUNITY
Start: 2021-10-22

## 2024-10-04 RX ORDER — SIMVASTATIN 20 MG
TABLET ORAL
Qty: 90 TABLET | Refills: 3 | Status: SHIPPED | OUTPATIENT
Start: 2024-10-04

## 2024-10-04 RX ORDER — METHYLPREDNISOLONE 4 MG
TABLET, DOSE PACK ORAL
Qty: 21 TABLET | Refills: 0 | Status: SHIPPED | OUTPATIENT
Start: 2024-10-04

## 2024-10-04 NOTE — PROGRESS NOTES
[facility] Progress Note    SUBJECTIVE: Dev Johnston 79 year old male is here today for   Chief Complaint   Patient presents with    Medication Follow-Up     Pt.is here for medication follow up       Skip has been having some nerve issues, sciatica, had back surgery in January with Dr. Smith, and never previously had pain down his leg    Went in trouble breathing, saw Chan Johnson, and did a scope, and found a foreign body in the lung.    Meloxicam was for the back surgery    Follows with Dr. Prabhakar yearly for his heart.    Has had issues with low blood pressure, and is high today, just had caffeine.    PMH  Past Medical History:    Adverse drug reaction    Age-related cataract of both eyes    Arthritis    Asthma (HCC)    Back pain    Back problem    Benign neoplasm of colon    BPH (benign prostatic hyperplasia)    Calculus of kidney    COPD (chronic obstructive pulmonary disease) (HCC)    NO HOME O2    Coronary atherosclerosis    Diverticulosis of colon (without mention of hemorrhage)    Easy bruising    Esophageal reflux    Flatulence/gas pain/belching    Frequent urination    Frequent use of laxatives    Heartburn    Hemorrhoids    High blood pressure    High cholesterol    History of cardiac murmur    Hx of heart artery stent    Prostate nodule    Had biopsy 2016    Stented coronary artery    one    Unspecified hemorrhoids without mention of complication    Visual impairment    READING GLASSES    Wears glasses    Wheezing        PSH  Past Surgical History:   Procedure Laterality Date    Angioplasty (coronary)  05/2011    Appendectomy  1949    Cataract Bilateral 11/2021    cataract removed    Colonoscopy  11/2012    + polyps    Insert intracoronary stent      Other surgical history  02/02/2022    Cystoscopy Dr. Olmos     Other surgical history  01/29/2024    LUMBAR 3 AND LUMBAR 4 LAMINECTOMIES WITH MEDIAL FACETECTOMIES    Tonsillectomy      Upper gi endoscopy - referral  03/22/2012     esophagitis, repeat in 5 year        Social Hx:  No changes    ROS  See HPI    OBJECTIVE:  /80   Pulse 79   Resp 16   Ht 6' (1.829 m)   Wt 162 lb (73.5 kg)   SpO2 98%   BMI 21.97 kg/m²         Labs:          Meds:   Current Outpatient Medications   Medication Sig Dispense Refill    Omeprazole 20 MG Oral Tab EC omeprazole 20 mg tablet,delayed release, [RxNorm: 684721]      sertraline 50 MG Oral Tab Take 1 tablet (50 mg total) by mouth daily. 90 tablet 1    finasteride 5 MG Oral Tab Take 1 tablet (5 mg total) by mouth daily. 90 tablet 3    simvastatin 20 MG Oral Tab TAKE 1 TABLET BY MOUTH EVERY NIGHT AT BEDTIME 90 tablet 3    albuterol 108 (90 Base) MCG/ACT Inhalation Aero Soln Inhale 2 puffs into the lungs every 6 (six) hours as needed for Wheezing. 1 each 3    Meloxicam 15 MG Oral Tab Take 1 tablet (15 mg total) by mouth daily. 90 tablet 1    TRELEGY ELLIPTA 200-62.5-25 MCG/ACT Inhalation Aerosol Powder, Breath Activated Inhale 1 puff into the lungs daily. 3 each 3    methylPREDNISolone (MEDROL) 4 MG Oral Tablet Therapy Pack As directed. 21 tablet 0    aspirin (EQ ASPIRIN ADULT LOW DOSE) 81 MG Oral Tab EC Aspirin 81 MG Oral Tab, [RxNorm: 421748]      acetaminophen 325 MG Oral Tab Take 1 tablet (325 mg total) by mouth every 6 (six) hours as needed for Pain.      Sildenafil Citrate 100 MG Oral Tab Take 1 tablet (100 mg total) by mouth as needed for Erectile Dysfunction. 4 tablet 11    predniSONE 20 MG Oral Tab TAKE 2 TABLETS BY MOUTH DAILY FOR 5 DAYS THEN TAKE 1 TABLET BY MOUTH DAILY FOR 5 DAYS (Patient not taking: Reported on 10/4/2024)           Assessment/Plan  Skip was seen today for medication follow-up.    Diagnoses and all orders for this visit:    Bilateral sciatica  -     Meloxicam 15 MG Oral Tab; Take 1 tablet (15 mg total) by mouth daily.  -     methylPREDNISolone (MEDROL) 4 MG Oral Tablet Therapy Pack; As directed.    BPH with obstruction/lower urinary tract symptoms  -     finasteride 5 MG  Oral Tab; Take 1 tablet (5 mg total) by mouth daily.    Hypercholesterolemia  -     simvastatin 20 MG Oral Tab; TAKE 1 TABLET BY MOUTH EVERY NIGHT AT BEDTIME    Pulmonary emphysema, unspecified emphysema type (HCC)  -     albuterol 108 (90 Base) MCG/ACT Inhalation Aero Soln; Inhale 2 puffs into the lungs every 6 (six) hours as needed for Wheezing.  -     TRELEGY ELLIPTA 200-62.5-25 MCG/ACT Inhalation Aerosol Powder, Breath Activated; Inhale 1 puff into the lungs daily.    Other orders  -     sertraline 50 MG Oral Tab; Take 1 tablet (50 mg total) by mouth daily.         Can continue to follow blood pressure, will reorder medications    Short course of steroid for sciatic pain and consider PT if persisting         Total Time spent with patient and coordinating care:  25 minutes.    Follow up: for wellness check      Tim Mujica MD

## 2024-10-19 NOTE — PROGRESS NOTES
HPI:     Dev Johnston is a 79 year old male with a PMH of HTN, HL, GERD OA.    Following for:  1. BPH/LUTS + h/o bladder stones  - s/p urolift and cystolithopaxy ([facility]) 3/22/22  - carlos/pro since ~ 2014, stopped ~ 2022, back on proscar 9/25/23 and rapaflo but just stopped proscar 4/20/24 d/t lightheadedness  - Cysto with me (10/23/23): mod BPH with very high riding neck in the mid-prostate up to the bladder which is likely iatrogenic/due to prior urolift. Mod BREANA. Patulous right ureter presumably indicating reflux.  2. chronic right hydro  - noted on CT since 2012 and stable  3. Recurrent MDR UTIs  - UCx 9/11/23, 11/21/22 MDR E coli S to gent, macrobid, zosyn  3. PVD  - Kegels reviewed  4. ED  - 100 mg viagra prn  5. Fam h/o CaP  - dad in 60s tx with XRT    PCP - Chrystal  Prior Urologist - [facility] 4/6/22  Cards - Aki    LOV 4/29/2024    Presents for check-up. He stopped finasteride last week because he has been getting dizzy and dizziness stopped. Working with Dr PAULINO and BP has improved. Drinking a lot more water.    Had urolift in March 2022, stopped carlos/pro and developed 2 septic UTIs afterwards requiring hospitalization. No prior UTIs.  Admitted from 9/12/23 - 9/16/23 with UTI, bacteremia. He had severe right flank pain while admitted but has two fractured vertebrae which may have been contributing.     Exercise: yes  Snoring: none  No interim UTIs.    He had back surgery in November.    AUA SS is 4/35 with 2 n, f. Was 17/35 prior to carlos/pro with 4 n; 3 w, I, FERNANDO; 2 u; 1 f. Happy with LUTS.  Incontinence: PVD which developed after urolift and using 1 PPD and damp. He is doing Kegels  Pensocrotal LOV: no abnormalities  BILLY LOV: ~ 40 g prostate, no nodules or tenderness    UA is negative and PVR is 83 mL - was zero    Hip/back pain: yes - b/l  Diarrhea/constipation: none  UTI hx: 2 since urolift. Nothing prior  Gross hematuria: 5-6 y ago  Tobacco hx: 30 pack years, quit 2006  Kidney stone hx:  none    < 5 % potency without meds and poor potency with 100 mg viagra prn.  Discussed a trial of 20 mg cialis versus trimix teaching and risks and benefits to both.  He declines trimix teaching and would like to try cialis.    PSA 2.33 7/12/23. We discussed the risks and benefits to PSA screening and he would prefer to check but will remain conservative give age.    CT SP 9/12/23: chronic, low lying right kidney with probable chronic mild-mod right UPJO, stable from CT in Nov 2022 and stable from CT report 1/9/12    Reported no plain water, 40-60 dilute tea oz water with clear urine. Now 60 water, no dilute tea with clear urine. I encouraged the pt continue to drink at least 40-60 oz water per day or enough to keep urine clear to pale yellow for UTI prevention.    Have discussed the right hydronephrosis appears stable since at least 2012. He has a patulous right distal ureteral orifice so may have a mild component of reflux as well.    For nocturia, I'd recommend the patient drink plenty of fluids first thing in the morning and avoid drinking anything at least 2-3 hours prior to bedtime. If the patient takes diuretic I would recommend they take them first thing in the morning. If the patient takes NSAIDs I would consider they switch to taking prior to bedtime. I'd also encourage regular physical activity (for at least 30 minutes at least three times per week) as this has been shown to help with nocturia. Finally we discussed that setting a regular bedtime can help regulate nocturnal levels of melatonin and ADH, which can help with sleep and reduce nocturia.    He will continue significantly increased water intake and has cut back on tea for adequate hydration/UTI prevention. Start exercising and consider switching meloxicam to qhs for nocturia with fluid adjustments for nocturia. Continue proscar. Continue Kegels. May continue 100 mg viagra prn. F/u in 6 mo or check-up wit PVR.    Prior note:  As symptoms worsened  since urolift I would recommend cysto at some point in the near future to determine if foreign bodies are noted within the lumen that could be contributing to issues.    For nocturia, I'd recommend the patient drink plenty of fluids first thing in the morning and avoid drinking anything at least 2-3 hours prior to bedtime. If the patient takes diuretic I would recommend they take them first thing in the morning. If the patient takes NSAIDs I would consider they switch to taking prior to bedtime. I'd also encourage regular physical activity (for at least 30 minutes at least three times per week) as this has been shown to help with nocturia. Finally we discussed that setting a regular bedtime can help regulate nocturnal levels of melatonin and ADH, which can help with sleep and reduce nocturia.    HISTORY:  Past Medical History:    Adverse drug reaction    Age-related cataract of both eyes    Arthritis    Asthma (HCC)    Back pain    Back problem    Benign neoplasm of colon    BPH (benign prostatic hyperplasia)    Calculus of kidney    COPD (chronic obstructive pulmonary disease) (HCC)    NO HOME O2    Coronary atherosclerosis    Diverticulosis of colon (without mention of hemorrhage)    Easy bruising    Esophageal reflux    Flatulence/gas pain/belching    Frequent urination    Frequent use of laxatives    Heartburn    Hemorrhoids    High blood pressure    High cholesterol    History of cardiac murmur    Hx of heart artery stent    Prostate nodule    Had biopsy 2016    Stented coronary artery    one    Unspecified hemorrhoids without mention of complication    Visual impairment    READING GLASSES    Wears glasses    Wheezing      Past Surgical History:   Procedure Laterality Date    Angioplasty (coronary)  05/2011    Appendectomy  1949    Cataract Bilateral 11/2021    cataract removed    Colonoscopy  11/2012    + polyps    Insert intracoronary stent      Other surgical history  02/02/2022    Cystoscopy Dr. Olmos      Other surgical history  2024    LUMBAR 3 AND LUMBAR 4 LAMINECTOMIES WITH MEDIAL FACETECTOMIES    Tonsillectomy      Upper gi endoscopy - referral  2012    esophagitis, repeat in 5 year      Family History   Problem Relation Age of Onset    Breast Cancer Sister     Other (CABG) Father         x4    Other (esophogeal reflux) Mother     Other (hip replacement) Mother       Social History:   Social History     Socioeconomic History    Marital status: Life Partner   Tobacco Use    Smoking status: Former     Current packs/day: 0.00     Average packs/day: 1 pack/day for 43.3 years (43.3 ttl pk-yrs)     Types: Cigarettes     Start date: 1968     Quit date: 2011     Years since quittin.4    Smokeless tobacco: Never   Vaping Use    Vaping status: Never Used   Substance and Sexual Activity    Alcohol use: No     Comment: Quit in . Prior had been a heavier drinker.    Drug use: Not Currently   Other Topics Concern    Caffeine Concern Yes    Exercise Yes     Comment: 6x/week     Social Drivers of Health     Financial Resource Strain: Low Risk  (2023)    Financial Resource Strain     Difficulty of Paying Living Expenses: Not very hard     Med Affordability: No   Food Insecurity: No Food Insecurity (2024)    Food Insecurity     Food Insecurity: Never true   Transportation Needs: No Transportation Needs (2024)    Transportation Needs     Lack of Transportation: No   Physical Activity: Sufficiently Active (10/16/2020)    Received from [facility]    Exercise Vital Sign     Days of Exercise per Week: 7 days     Minutes of Exercise per Session: 30 min   Housing Stability: Low Risk  (2024)    Housing Stability     Housing Instability: No          Medications (Active prior to today's visit):  Current Outpatient Medications   Medication Sig Dispense Refill    silodosin (RAPAFLO) 8 MG Oral Cap Take 1 capsule (8 mg total) by mouth daily. 90 capsule 5     finasteride 5 MG Oral Tab Take 1 tablet (5 mg total) by mouth daily. 90 tablet 5    Omeprazole 20 MG Oral Tab EC omeprazole 20 mg tablet,delayed release, [RxNorm: 820416]      sertraline 50 MG Oral Tab Take 1 tablet (50 mg total) by mouth daily. 90 tablet 1    simvastatin 20 MG Oral Tab TAKE 1 TABLET BY MOUTH EVERY NIGHT AT BEDTIME 90 tablet 3    albuterol 108 (90 Base) MCG/ACT Inhalation Aero Soln Inhale 2 puffs into the lungs every 6 (six) hours as needed for Wheezing. 1 each 3    Meloxicam 15 MG Oral Tab Take 1 tablet (15 mg total) by mouth daily. 90 tablet 1    TRELEGY ELLIPTA 200-62.5-25 MCG/ACT Inhalation Aerosol Powder, Breath Activated Inhale 1 puff into the lungs daily. 3 each 3    aspirin (EQ ASPIRIN ADULT LOW DOSE) 81 MG Oral Tab EC Aspirin 81 MG Oral Tab, [RxNorm: 330168]      Sildenafil Citrate 100 MG Oral Tab Take 1 tablet (100 mg total) by mouth as needed for Erectile Dysfunction. 4 tablet 11       Allergies:  No Known Allergies      ROS:     A comprehensive 10 point review of systems was completed.  Pertinent positives and negatives noted in the the HPI.    PHYSICAL EXAM:     GENERAL APPEARANCE: well, developed, well nourished, in no acute distress  NEUROLOGIC: nonfocal, alert and oriented  HEAD: normocephalic, atraumatic  EYES: sclera non-icteric  EARS: hearing intact  ORAL CAVITY: mucosa moist  NECK/THYROID: no obvious goiter or masses  LUNGS: nonlabored breathing  ABDOMEN: soft, no obvious masses or tenderness  SKIN: no obvious rashes    : as noted above    ASSESSMENT/PLAN:   Diagnoses and all orders for this visit:    BPH with obstruction/lower urinary tract symptoms  -     silodosin (RAPAFLO) 8 MG Oral Cap; Take 1 capsule (8 mg total) by mouth daily.  -     finasteride 5 MG Oral Tab; Take 1 tablet (5 mg total) by mouth daily.    Recurrent UTI    Post-void dribbling    Hydronephrosis of right kidney    Nocturia    Foreign body in bladder, initial encounter    Erectile dysfunction, unspecified  erectile dysfunction type    Elevated PSA  -     PSA Total, Diagnostic      - as noted above.    Thanks again for this consult.    Duran Kaur MD, FACS  Urologist  [facility]  Office: [phone]

## 2024-10-28 ENCOUNTER — OFFICE VISIT (OUTPATIENT)
Dept: SURGERY | Facility: CLINIC | Age: 79
End: 2024-10-28
Payer: MEDICARE

## 2024-10-28 DIAGNOSIS — N52.9 ERECTILE DYSFUNCTION, UNSPECIFIED ERECTILE DYSFUNCTION TYPE: ICD-10-CM

## 2024-10-28 DIAGNOSIS — N39.0 RECURRENT UTI: ICD-10-CM

## 2024-10-28 DIAGNOSIS — N39.43 POST-VOID DRIBBLING: ICD-10-CM

## 2024-10-28 DIAGNOSIS — N13.30 HYDRONEPHROSIS OF RIGHT KIDNEY: ICD-10-CM

## 2024-10-28 DIAGNOSIS — T19.1XXA FOREIGN BODY IN BLADDER, INITIAL ENCOUNTER: ICD-10-CM

## 2024-10-28 DIAGNOSIS — N40.1 BPH WITH OBSTRUCTION/LOWER URINARY TRACT SYMPTOMS: Primary | ICD-10-CM

## 2024-10-28 DIAGNOSIS — N13.8 BPH WITH OBSTRUCTION/LOWER URINARY TRACT SYMPTOMS: Primary | ICD-10-CM

## 2024-10-28 DIAGNOSIS — R97.20 ELEVATED PSA: ICD-10-CM

## 2024-10-28 DIAGNOSIS — R35.1 NOCTURIA: ICD-10-CM

## 2024-10-28 PROCEDURE — G2211 COMPLEX E/M VISIT ADD ON: HCPCS | Performed by: UROLOGY

## 2024-10-28 PROCEDURE — 99214 OFFICE O/P EST MOD 30 MIN: CPT | Performed by: UROLOGY

## 2024-10-28 PROCEDURE — 51798 US URINE CAPACITY MEASURE: CPT | Performed by: UROLOGY

## 2024-10-28 RX ORDER — SILODOSIN 8 MG/1
8 CAPSULE ORAL DAILY
Qty: 90 CAPSULE | Refills: 5 | Status: SHIPPED | OUTPATIENT
Start: 2024-10-28

## 2024-10-28 RX ORDER — FINASTERIDE 5 MG/1
5 TABLET, FILM COATED ORAL DAILY
Qty: 90 TABLET | Refills: 5 | Status: SHIPPED | OUTPATIENT
Start: 2024-10-28

## 2024-11-11 ENCOUNTER — LAB ENCOUNTER (OUTPATIENT)
Dept: LAB | Age: 79
End: 2024-11-11
Attending: UROLOGY
Payer: MEDICARE

## 2024-11-11 LAB — PSA SERPL-MCNC: 0.42 NG/ML (ref ?–4)

## 2024-11-11 PROCEDURE — 84153 ASSAY OF PSA TOTAL: CPT | Performed by: UROLOGY

## 2024-11-11 PROCEDURE — 36415 COLL VENOUS BLD VENIPUNCTURE: CPT | Performed by: UROLOGY

## 2024-12-30 ENCOUNTER — HOSPITAL ENCOUNTER (OUTPATIENT)
Age: 79
Discharge: HOME | End: 2024-12-30
Payer: MEDICARE

## 2024-12-30 VITALS
DIASTOLIC BLOOD PRESSURE: 57 MMHG | WEIGHT: 165 LBS | RESPIRATION RATE: 18 BRPM | SYSTOLIC BLOOD PRESSURE: 117 MMHG | HEART RATE: 91 BPM | BODY MASS INDEX: 22.35 KG/M2 | TEMPERATURE: 97 F | OXYGEN SATURATION: 97 % | HEIGHT: 72 IN

## 2024-12-30 DIAGNOSIS — S80.811A: Primary | ICD-10-CM

## 2024-12-30 DIAGNOSIS — L08.9: Primary | ICD-10-CM

## 2024-12-30 PROCEDURE — 99213 OFFICE O/P EST LOW 20 MIN: CPT

## 2024-12-30 RX ORDER — CEPHALEXIN 500 MG/1
500 CAPSULE ORAL 3 TIMES DAILY
Qty: 30 CAPSULE | Refills: 0 | Status: SHIPPED | OUTPATIENT
Start: 2024-12-30 | End: 2024-12-31 | Stop reason: SINTOL

## 2024-12-30 NOTE — ED INITIAL ASSESSMENT (HPI)
C/o tripped and fell last week. Denies hitting head. Sustained abrasions to right lower leg. Abrasions with odor and redness around the wound. Tdap 2023

## 2024-12-30 NOTE — DISCHARGE INSTRUCTIONS
Rest and drink plenty of fluids.   Start antibiotics and make sure to finish the entire prescription.   Leave wound open to air at this time to allow for healing.   Follow up with your PCP in 1 week.

## 2024-12-30 NOTE — ED PROVIDER NOTES
Patient Seen in: [facility] [place] [facility]      History     Chief Complaint   Patient presents with    Fall    Wound Care     Stated Complaint: Fall; Wound Care    Subjective:   78 yo male presents to the immediate care with c/o right leg redness.  Patient states he tripped and fell last week and has some abrasions to his right lower leg.  He is on blood thinners for heart issues.  His wife has been putting antibiotic ointment and band-aids on his wounds, but noticed yesterday they looked red around the edges.  Today the redness has increased some.  They called his doctor and were instructed to bring patient in to be checked.  He denies any fever, chills, head injury, exudates, or pain.     The history is provided by the patient.         Objective:     Past Medical History:    Adverse drug reaction    Age-related cataract of both eyes    Arthritis    Asthma (HCC)    Back pain    Back problem    Benign neoplasm of colon    BPH (benign prostatic hyperplasia)    Calculus of kidney    COPD (chronic obstructive pulmonary disease) ([facility])    NO HOME O2    Coronary atherosclerosis    Diverticulosis of colon (without mention of hemorrhage)    Easy bruising    Esophageal reflux    Flatulence/gas pain/belching    Frequent urination    Frequent use of laxatives    Heartburn    Hemorrhoids    High blood pressure    High cholesterol    History of cardiac murmur    Hx of heart artery stent    Prostate nodule    Had biopsy 2016    Stented coronary artery    one    Unspecified hemorrhoids without mention of complication    Visual impairment    READING GLASSES    Wears glasses    Wheezing              Past Surgical History:   Procedure Laterality Date    Angioplasty (coronary)  05/2011    Appendectomy  1949    Cataract Bilateral 11/2021    cataract removed    Colonoscopy  11/2012    + polyps    Insert intracoronary stent      Other surgical history  02/02/2022    Cystoscopy Dr. Olmos     Other surgical history  01/29/2024     LUMBAR 3 AND LUMBAR 4 LAMINECTOMIES WITH MEDIAL FACETECTOMIES    Tonsillectomy      Upper gi endoscopy - referral  2012    esophagitis, repeat in 5 year                Social History     Socioeconomic History    Marital status: Life Partner   Tobacco Use    Smoking status: Former     Current packs/day: 0.00     Average packs/day: 1 pack/day for 43.3 years (43.3 ttl pk-yrs)     Types: Cigarettes     Start date: 1968     Quit date: 2011     Years since quittin.6    Smokeless tobacco: Never   Vaping Use    Vaping status: Never Used   Substance and Sexual Activity    Alcohol use: No     Comment: Quit in . Prior had been a heavier drinker.    Drug use: Not Currently   Other Topics Concern    Caffeine Concern Yes    Exercise Yes     Comment: 6x/week     Social Drivers of Health     Financial Resource Strain: Low Risk  (2023)    Financial Resource Strain     Difficulty of Paying Living Expenses: Not very hard     Med Affordability: No   Food Insecurity: No Food Insecurity (2024)    Food Insecurity     Food Insecurity: Never true   Transportation Needs: No Transportation Needs (2024)    Transportation Needs     Lack of Transportation: No   Physical Activity: Sufficiently Active (10/16/2020)    Received from [facility]    Exercise Vital Sign     Days of Exercise per Week: 7 days     Minutes of Exercise per Session: 30 min   Housing Stability: Low Risk  (2024)    Housing Stability     Housing Instability: No              Review of Systems   Constitutional: Negative.    Skin:  Positive for color change and wound.   All other systems reviewed and are negative.      Positive for stated complaint: Fall; Wound Care  Other systems are as noted in HPI.  Constitutional and vital signs reviewed.      All other systems reviewed and negative except as noted above.    Physical Exam     ED Triage Vitals [24 1129]   /57   Pulse 91   Resp 18   Temp  97.4 °F (36.3 °C)   Temp src Oral   SpO2 97 %   O2 Device None (Room air)       Current Vitals:   Vital Signs  BP: 117/57  Pulse: 91  Resp: 18  Temp: 97.4 °F (36.3 °C)  Temp src: Oral    Oxygen Therapy  SpO2: 97 %  O2 Device: None (Room air)        Physical Exam  Vitals and nursing note reviewed.   Constitutional:       General: He is not in acute distress.     Appearance: Normal appearance. He is normal weight. He is not ill-appearing.   HENT:      Head: Normocephalic and atraumatic.      Mouth/Throat:      Mouth: Mucous membranes are moist.      Pharynx: Oropharynx is clear.   Eyes:      Conjunctiva/sclera: Conjunctivae normal.      Pupils: Pupils are equal, round, and reactive to light.   Cardiovascular:      Rate and Rhythm: Normal rate and regular rhythm.      Pulses: Normal pulses.      Heart sounds: Normal heart sounds.   Pulmonary:      Effort: Pulmonary effort is normal. No respiratory distress.      Breath sounds: Normal breath sounds.   Musculoskeletal:         General: Normal range of motion.   Skin:     General: Skin is warm and dry.      Comments: Abrasion to right anterior lower leg.  Most superior abrasion is approximately 2 cm x 2.5 cm.  There is surrounding erythema, edema, and heat to the area.  More distal abrasion is 3.5 cm x 2 cm with similar appearance.  There is deep purple ecchymosis to the lateral aspect of the right lower leg.  Dp is 2+.    Neurological:      General: No focal deficit present.      Mental Status: He is alert and oriented to person, place, and time.   Psychiatric:         Mood and Affect: Mood normal.         Behavior: Behavior normal.           ED Course   Labs Reviewed - No data to display       MDM        Medical Decision Making  79-year-old male with history and exam consistent with an infected abrasion to the right lower leg.  Will treat patient with p.o. antibiotics.  No evidence of sepsis.  Supportive management at home reviewed with patient and spouse.  Patient is  good understanding that if anything changes or worsens go to the ER for further evaluation.  Otherwise follow-up with PCP in 1 week.    Risk  OTC drugs.  Prescription drug management.        Disposition and Plan     Clinical Impression:  1. Infected abrasion of leg, right, initial encounter         Disposition:  Discharge  12/30/2024 11:51 am    Follow-up:  Duran Kaur MD  [address]  [facility] [address]  [phone]    In 1 week  As needed          Medications Prescribed:  Discharge Medication List as of 12/30/2024 11:55 AM        START taking these medications    Details   cephALEXin 500 MG Oral Cap Take 1 capsule (500 mg total) by mouth 3 (three) times daily for 10 days., Normal, Disp-30 capsule, R-0                 Supplementary Documentation:

## 2024-12-31 ENCOUNTER — TELEPHONE (OUTPATIENT)
Dept: FAMILY MEDICINE CLINIC | Facility: CLINIC | Age: 79
End: 2024-12-31

## 2024-12-31 RX ORDER — DICLOXACILLIN SODIUM 500 MG/1
500 CAPSULE ORAL 4 TIMES DAILY
Qty: 20 CAPSULE | Refills: 0 | Status: SHIPPED | OUTPATIENT
Start: 2024-12-31

## 2024-12-31 NOTE — TELEPHONE ENCOUNTER
Will replace with penicillin type medication    Dev,    I discussed your request with your physician. The doctor is concerned about your symptoms and would like to see you in the office to discuss them.    Please remember that you can <a [email or web address]">schedule your own appointment online</a>, or you can call the office for an appointment.      Feel free to reply to this message or call the office if you have any questions or concerns.

## 2024-12-31 NOTE — TELEPHONE ENCOUNTER
Called - left message to call back.      Infected abrasion of leg, right, initial encounter  Given Cephalexin 500 mg 12/30. Took 2 doses.had severe sob and dizziness. Told him to stop taking medication. Breathing is fine now. Please advise on new abx.

## 2024-12-31 NOTE — TELEPHONE ENCOUNTER
Patient partner called stating that her partner fell about a week ago and patient went to the immediate care they gave him a antibiotic which made him have a bad reaction to it.    Patient partner wanting to know if he could be prescribed a different antibiotic instead.    Patient couldn't hardly breathe and it was making patient dizzy from this medication    Cephalexin      Please call patient partner back Adrienne [phone] to discuss another medication that can be given.    Please advise

## 2025-02-10 ENCOUNTER — OFFICE VISIT (OUTPATIENT)
Dept: FAMILY MEDICINE CLINIC | Facility: CLINIC | Age: 80
End: 2025-02-10
Payer: MEDICARE

## 2025-02-10 ENCOUNTER — TELEPHONE (OUTPATIENT)
Dept: PHYSICAL THERAPY | Facility: HOSPITAL | Age: 80
End: 2025-02-10

## 2025-02-10 VITALS
HEART RATE: 68 BPM | OXYGEN SATURATION: 97 % | BODY MASS INDEX: 22.48 KG/M2 | HEIGHT: 72 IN | SYSTOLIC BLOOD PRESSURE: 120 MMHG | WEIGHT: 166 LBS | DIASTOLIC BLOOD PRESSURE: 60 MMHG | RESPIRATION RATE: 16 BRPM

## 2025-02-10 DIAGNOSIS — M54.31 RIGHT SIDED SCIATICA: Primary | ICD-10-CM

## 2025-02-10 DIAGNOSIS — F41.9 ANXIETY: ICD-10-CM

## 2025-02-10 DIAGNOSIS — G25.81 RESTLESS LEG SYNDROME: ICD-10-CM

## 2025-02-10 DIAGNOSIS — R29.6 FALLING: ICD-10-CM

## 2025-02-10 PROCEDURE — 1159F MED LIST DOCD IN RCRD: CPT | Performed by: FAMILY MEDICINE

## 2025-02-10 PROCEDURE — 99214 OFFICE O/P EST MOD 30 MIN: CPT | Performed by: FAMILY MEDICINE

## 2025-02-10 RX ORDER — GABAPENTIN 300 MG/1
300 CAPSULE ORAL NIGHTLY
COMMUNITY
Start: 2024-11-25 | End: 2025-02-10

## 2025-02-10 RX ORDER — SERTRALINE HYDROCHLORIDE 100 MG/1
100 TABLET, FILM COATED ORAL DAILY
Qty: 90 TABLET | Refills: 1 | Status: SHIPPED | OUTPATIENT
Start: 2025-02-10

## 2025-02-10 RX ORDER — PRAMIPEXOLE DIHYDROCHLORIDE 0.12 MG/1
0.12 TABLET ORAL NIGHTLY
Qty: 30 TABLET | Refills: 0 | Status: SHIPPED | OUTPATIENT
Start: 2025-02-10

## 2025-02-10 NOTE — PROGRESS NOTES
[facility] Progress Note    SUBJECTIVE: Dev Johnston 79 year old male is here today for   Chief Complaint   Patient presents with    Follow - Up     Pt.Is here for follow up for restless leg .       Dealing with restless leg, has mauricio for years, when goes to bed has to get out for 2 hours, and really interfering with sleep, up for at least 2 hours, can be either leg.    His wife notes his leg keeps jerking even while sleeping    Found the steroid didn't work for the sciatica    Tried gabapentin, and didn't work for the restless leg or sciatica    Is dizzy and falling more often.    Have tried non script treatments    Persistent sciatica, and didn't improve with steroid course. Worse when getting out of the car, better with walking. Can be bad in the morning.    The dizziness will be a lack of balance. Not lightheadedness    Is taking sertraline, but still having mood swings, has improved on medication. Still having anxiousness more often    PMH  Past Medical History:    Adverse drug reaction    Age-related cataract of both eyes    Arthritis    Asthma (HCC)    Back pain    Back problem    Benign neoplasm of colon    BPH (benign prostatic hyperplasia)    Calculus of kidney    COPD (chronic obstructive pulmonary disease) (HCC)    NO HOME O2    Coronary atherosclerosis    Diverticulosis of colon (without mention of hemorrhage)    Easy bruising    Esophageal reflux    Flatulence/gas pain/belching    Frequent urination    Frequent use of laxatives    Heartburn    Hemorrhoids    High blood pressure    High cholesterol    History of cardiac murmur    Hx of heart artery stent    Prostate nodule    Had biopsy 2016    Stented coronary artery    one    Unspecified hemorrhoids without mention of complication    Visual impairment    READING GLASSES    Wears glasses    Wheezing        PSH  Past Surgical History:   Procedure Laterality Date    Angioplasty (coronary)  05/2011    Appendectomy  1949    Cataract  Bilateral 11/2021    cataract removed    Colonoscopy  11/2012    + polyps    Insert intracoronary stent      Other surgical history  02/02/2022    Cystoscopy Dr. Olmos     Other surgical history  01/29/2024    LUMBAR 3 AND LUMBAR 4 LAMINECTOMIES WITH MEDIAL FACETECTOMIES    Tonsillectomy      Upper gi endoscopy - referral  03/22/2012    esophagitis, repeat in 5 year        Social Hx:  No changes    ROS  See HPI    OBJECTIVE:  /60   Pulse 68   Resp 16   Ht 6' (1.829 m)   Wt 166 lb (75.3 kg)   SpO2 97%   BMI 22.51 kg/m²     Exam  Neuro: CN II-XII grossly intact, normal finger to nose, normal rapid alternating movement normal gait, romberg normal      Labs:          Meds:   Current Outpatient Medications   Medication Sig Dispense Refill    pramipexole 0.125 MG Oral Tab Take 1 tablet (0.125 mg total) by mouth at bedtime. 30 tablet 0    sertraline 100 MG Oral Tab Take 1 tablet (100 mg total) by mouth daily. 90 tablet 1    silodosin (RAPAFLO) 8 MG Oral Cap Take 1 capsule (8 mg total) by mouth daily. 90 capsule 5    finasteride 5 MG Oral Tab Take 1 tablet (5 mg total) by mouth daily. 90 tablet 5    Omeprazole 20 MG Oral Tab EC omeprazole 20 mg tablet,delayed release, [RxNorm: 822358]      simvastatin 20 MG Oral Tab TAKE 1 TABLET BY MOUTH EVERY NIGHT AT BEDTIME 90 tablet 3    albuterol 108 (90 Base) MCG/ACT Inhalation Aero Soln Inhale 2 puffs into the lungs every 6 (six) hours as needed for Wheezing. 1 each 3    Meloxicam 15 MG Oral Tab Take 1 tablet (15 mg total) by mouth daily. 90 tablet 1    TRELEGY ELLIPTA 200-62.5-25 MCG/ACT Inhalation Aerosol Powder, Breath Activated Inhale 1 puff into the lungs daily. 3 each 3    aspirin (EQ ASPIRIN ADULT LOW DOSE) 81 MG Oral Tab EC Aspirin 81 MG Oral Tab, [RxNorm: 020930]      Sildenafil Citrate 100 MG Oral Tab Take 1 tablet (100 mg total) by mouth as needed for Erectile Dysfunction. 4 tablet 11         Assessment/Plan  Skip was seen today for follow -  up.    Diagnoses and all orders for this visit:    Right sided sciatica  -     Physical Therapy Referral - [facility]    Restless leg syndrome  -     pramipexole 0.125 MG Oral Tab; Take 1 tablet (0.125 mg total) by mouth at bedtime.    Falling  -     Physical Therapy Referral - [facility]    Anxiety  -     sertraline 100 MG Oral Tab; Take 1 tablet (100 mg total) by mouth daily.         Will initiate treatment for RLS, if not improving consider neuro consult due to variety of symptoms    PT for balance and sciaticia    Increase sertraline due to mood issue, but imrpoved on 50 mg,         Total Time spent with patient and coordinating care:  20 minutes.    Follow up: as needed      Tim Mujica MD

## 2025-02-11 DIAGNOSIS — G25.81 RESTLESS LEG SYNDROME: ICD-10-CM

## 2025-02-11 RX ORDER — PRAMIPEXOLE DIHYDROCHLORIDE 0.12 MG/1
0.12 TABLET ORAL NIGHTLY
Qty: 90 TABLET | Refills: 0 | Status: SHIPPED | OUTPATIENT
Start: 2025-02-11

## 2025-02-13 ENCOUNTER — OFFICE VISIT (OUTPATIENT)
Dept: PHYSICAL THERAPY | Facility: HOSPITAL | Age: 80
End: 2025-02-13
Attending: FAMILY MEDICINE
Payer: MEDICARE

## 2025-02-13 DIAGNOSIS — R29.6 FALLING: ICD-10-CM

## 2025-02-13 DIAGNOSIS — M54.31 RIGHT SIDED SCIATICA: Primary | ICD-10-CM

## 2025-02-13 PROCEDURE — 97110 THERAPEUTIC EXERCISES: CPT

## 2025-02-13 PROCEDURE — 97161 PT EVAL LOW COMPLEX 20 MIN: CPT

## 2025-02-13 NOTE — PROGRESS NOTES
LOWER EXTREMITY EVALUATION:     Diagnosis:   Right sided sciatica (M54.31) Falling (R29.6) Patient:  Dev Johnston (79 year old, male)        Referring Provider: Tim Mujica  Today's Date   2/13/2025    Precautions:  Fall Risk   Date of Evaluation: 02/13/25  Next MD visit: as needed  Date of Surgery: -- (laminectomy in January 2024)     PATIENT SUMMARY   Summary of chief complaints: R sided back pain, R sided thigh pain  History of current condition: 2 years ongoing. Had laminectomy in January 2024. Also had a fall before surgery where he injured his back. Went to PT after back injury. He has had on/off back pain since laminectomy. Currently he has been experiencing leg pain near R hip/thigh for the past few months. Has restless leg syndrome. Sitting for long periods of time (hours) bothers hip, getting out of the car, and initially going to stanidng bothers the hip/thigh. Feels pain after walking through the grocery store. Does not have difficulty sleeping due to pain. Relieving activities include sitting down. Gets immediate relief with sitting down. Feels weakness in the R leg. Reports most recent fall was a year ago, per chart had a fall in December. Wants to improve balance.  Back is painful with bending. Does not feel like back and hip pain are related.   Pain level: current 0 /10, at best 0 /10, at worst 5 /10  Description of symptoms: Weakness R leg, pain in the leg is the sharp pain, pain in back is aching pain. Pain stays in thigh, does not go past the knee. Denies numbness/tingling   Occupation:     Leisure activities/Hobbies: Walking (1 year ago) up to two miles/day. Currently able to walk about half a mile.   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  Current limitations: Walking for long periods of time.  Pt goals: Leg pain go away, get back towalking.  Past medical history was reviewed with Dev.  Significant findings include:     Imaging/Tests: no recent imaging   Dev  has a past medical history of Adverse drug reaction (12/10/2014), Age-related cataract of both eyes (08/04/2021), Arthritis (2010), Asthma (HCC), Back pain (2010), Back problem, Benign neoplasm of colon, BPH (benign prostatic hyperplasia) (07/29/2013), Calculus of kidney (?), COPD (chronic obstructive pulmonary disease) (HCC), Coronary atherosclerosis, Diverticulosis of colon (without mention of hemorrhage), Easy bruising (2010), Esophageal reflux, Flatulence/gas pain/belching (occasional), Frequent urination (2010), Frequent use of laxatives (occasional), Heartburn (1998?), Hemorrhoids (?), High blood pressure, High cholesterol, History of cardiac murmur (?), heart artery stent, Prostate nodule (07/27/2016), Stented coronary artery (?), Unspecified hemorrhoids without mention of complication (06/29/2012), Visual impairment, Wears glasses (2020), and Wheezing (2010).  He  has a past surgical history that includes colonoscopy (11/2012); insert intracoronary stent; tonsillectomy; upper gi endoscopy - referral (03/22/2012); cataract (Bilateral, 11/2021); other surgical history (02/02/2022); angioplasty (coronary) (05/2011); appendectomy (1949); and other surgical history (01/29/2024).    ASSESSMENT  Dev presents to physical therapy evaluation with primary c/o R sided back pain, R sided thigh pain. The results of the objective tests and measures show - hip special testing, + R lumbar posterior quadrant testing, decreased global hip strength, relief with lumbar flexion, decreased hamstring length bilaterally. Functional deficits include but are not limited to Walking for long periods of time.. Signs and symptoms are consistent with diagnosis of Right sided sciatica (M54.31) Falling (R29.6). Pt and PT discussed evaluation findings, pathology, POC and HEP.  Pt voiced understanding and performs HEP correctly without reported pain. Skilled Physical Therapy is medically  necessary to address the above impairments and reach functional goals.    OBJECTIVE:   Musculoskeletal  Observation: BP: 116/64  Palpation: no tenderness to palpation of piriformis, glutes, ITB bilaterally. No tenderness to palpation of lumbar paraspinals.   Accessory Motion: hypomobility with CPA in T6-10     Edema: None present    Special Tests:    Lumbar Special Tests:  Lumbar Quadrant Testing: + for R posterior quadrant   SLR: R -, L -  Active SLR: R -, L -  Long Axis Distraction: -  Prone Instability Test: -  Hip Special Tests:  Scour Test: R -, L -  Maynor Test: R -, L -  Angus Test: R -, L -  Kenn's Test: R -, L -  Symptom Modification-Long Axis Distraction (Hip OA): -        VIOLA ROM WNL and Strength (5/5) except below:  (* denotes performed with pain)  Trunk ROM MMT (-/5)     Flex 35 cm from ground       Ext        R L R L     Side bend 60 cm from ground * leg pain 60 cm from ground reports relief         Rotation           ,   Hip   ROM MMT (-/5)    R L R L     Flex (L2) 125*tightness 126 4 4+     Ext  15 deg 15 deg 4+ 4+     Abd     4+ 4+     ER WNL WNL 4+ 4+     IR WNL WNL 4+ 4+     ,   Knee   ROM MMT (-/5)    R L R L     Flex     4+ 4+     Ext (L3)     4+ 4+         Flexibility:  LE Flexibility R L     Hip Flexor 15 deg ext 15 deg ext     Hamstrings 53 53     ITB no limitations no limitations     Piriformis slight limitations no limitations     Quads         Gastroc-soleus         Neurological:  Sensation: SILT     Balance and Functional Mobility:  Gait: pt ambulates on level ground with decreased step length; decreased foot clearance   Balance: SLS: TBA at next therapy session   Functional Mobility: TBA at next therapy session    Today's Treatment and Response:   Pt education was provided on exam findings, treatment diagnosis, treatment plan, expectations, and prognosis.  Today's Treatment       2/13/2025   PT Treatment   Therapeutic Exercise SKTC stretch 3 x 20 sec R/L  Lower trunk rotations x 20     Therapeutic Exercise Min 10   Evaluation Min 37   Total of Timed Procedures 10   Total of Service Based 37   Total Treatment Time 47         Patient was instructed in and issued a HEP for: Access Code: [email or web address]  URL: [email or web address]  Date: 02/13/2025  Prepared by: Chantal Charlton    Exercises  - Supine Single Knee to Chest Stretch  - 1 x daily - 7 x weekly - 1 sets - 3 reps - 20 hold  - Supine Lower Trunk Rotation  - 1 x daily - 7 x weekly - 3 sets - 10 reps    Charges:  PT EVAL: Low Complexity, 1 TherEx  In agreement with evaluation findings and clinical rationale, this evaluation involved LOW COMPLEXITY decision making due to no personal factors/comorbidities, 1-2 body structures involved/activity limitations, and stable symptoms as documented in the evaluation.                                                                                                                PLAN OF CARE:    Goals: (to be met in 8 visits)   Goals       Therapy Goals      Not Met Progress Toward Partially Met Met   Pt will improve transversus abdominis recruitment to perform proper isometric contraction without requiring verbal or tactile cuing to promote advancement of therex. [] [] [] []   Pt will demonstrate good understanding of proper posture and body mechanics to decrease pain and improve spinal safety. [] [] [] []   Pt will report improved symptom centralization and absence of radicular symptoms for 3 consecutive days to improve function with ADLs. [] [] [] []   Pt will have decreased paraspinal mm tension to tolerate standing >60 minutes for work and home activities. [] [] [] []   Pt will demonstrate improved core strength to be able to perform sit to stand with <2/10 pain. [] [] [] []   Pt will be independent and compliant with comprehensive HEP to maintain progress achieved in PT [] [] [] []                   Frequency / Duration: Patient will be seen 1-2x/week or a total of 8  visits over a 90 day  period. Treatment will include: Gait training; Manual Therapy; Neuromuscular Re-education; Therapeutic Activities; Therapeutic Exercise; Home Exercise Program instruction    Education or treatment limitation: None   Rehab Potential: fair     LEFS Score  LEFS Score: (Patient-Rptd) 61.25 % (2/11/2025 12:31 PM)      Patient/Family/Caregiver was advised of these findings, precautions, and treatment options and has agreed to actively participate in planning and for this course of care.    Thank you for your referral. Please co-sign or sign and return this letter via fax as soon as possible to [phone]. If you have any questions, please contact me at Dept: [phone]    Sincerely,  Electronically signed by therapist: Chantal Charlton PT  Physician's certification required: Yes  I certify the need for these services furnished under this plan of treatment and while under my care.    X___________________________________________________ Date____________________    Certification From: 2/13/2025  To:5/14/2025

## 2025-02-19 ENCOUNTER — OFFICE VISIT (OUTPATIENT)
Dept: PHYSICAL THERAPY | Facility: HOSPITAL | Age: 80
End: 2025-02-19
Attending: FAMILY MEDICINE
Payer: MEDICARE

## 2025-02-19 PROCEDURE — 97112 NEUROMUSCULAR REEDUCATION: CPT

## 2025-02-19 PROCEDURE — 97110 THERAPEUTIC EXERCISES: CPT

## 2025-02-19 NOTE — PROGRESS NOTES
Patient: Dev Johnston (79 year old, male) Referring Provider:  Insurance:   Diagnosis: Right sided sciatica (M54.31) Falling (R29.6) Tim BROOKS   Date of Surgery: -- (laminectomy in January 2024) Next MD visit:  N/A   Precautions:  Fall Risk as needed Referral Information:    Date of Evaluation: Req: 0, Auth: 0, Exp:     02/13/25 POC Auth Visits:          Today's Date   2/19/2025    Subjective  He stlll notices pain in the hip. The back is a little sore today. Leaning forward and reaching, lifting objects in a forward reach position hurts. He cannot maintain a bent forward reaching postitiong for greater than 1-2 minutes. States that the exercises are going well at home.       Pain: 5/10 (Reports 0/10 pain post intervention.)     Objective  5x STS: 17.24, SLS 4 sec on L, 3 sec on R. Static balance assessment: FA EO: 20 sec, FT EO: 20 sec, Semitandem: 20 sec, Tandem: 20 sec increased sway, FA EC: 20 sec increased sway, FT EC: 20 sec increased sway. Lumbar AROM: flexion: unremarkable, ext: unremarkable, L sidebending: relief, R sidebending: unremarkable.              Assessment  Pt tolerated mobility and strengthening well, reporting decrease in pain post therapy intervention. Continued with flexion toward L  as pt continues to report relief with L directed flexion. Educated on body mechanics while working to keep surface elevated and especially to keep work close to body to limit forward reaching and lifting while bent forward. Verbalized understanding.    Goals (to be met in 8 visits)   Goals       Therapy Goals      Not Met Progress Toward Partially Met Met   Pt will improve transversus abdominis recruitment to perform proper isometric contraction without requiring verbal or tactile cuing to promote advancement of therex. [] [] [] []   Pt will demonstrate good understanding of proper posture and body mechanics to decrease pain and improve spinal safety. [] [] [] []   Pt will report improved  symptom centralization and absence of radicular symptoms for 3 consecutive days to improve function with ADLs. [] [] [] []   Pt will have decreased paraspinal mm tension to tolerate standing >60 minutes for work and home activities. [] [] [] []   Pt will demonstrate improved core strength to be able to perform sit to stand with <2/10 pain. [] [] [] []   Pt will be independent and compliant with comprehensive HEP to maintain progress achieved in PT [] [] [] []                   Plan  Continue with balance, strengthening, and mobility. Plan for next therapy session: assess stair mobility.    Treatment Last 4 Visits       2/13/2025 2/19/2025   PT Treatment   Treatment Day  2   Therapeutic Exercise SKTC stretch 3 x 20 sec R/L  Lower trunk rotations x 20  SKTC 3 x 30 sec R/L   LTR x 20 R/L  DKTC x 20 R/L   SB bridges 2 x 10 reps   Clamshells YTB 2 x 10 R/L  SB rollouts toward L x 10     Time spent on education of proper body mechanics while working.       Neuro Re-Ed  Balance assessment as in objective information above:      Therapeutic Exercise Min 10 33   Neuro Re-Ed Min  12   Evaluation Min 37    Total of Timed Procedures 10 45   Total of Service Based 37 0   Total Treatment Time 47 45         HEP  Access Code: [email or web address]  URL: [email or web address]  Date: 02/19/2025  Prepared by: Chantal Charlton    Exercises  - Supine Single Knee to Chest Stretch  - 1 x daily - 7 x weekly - 1 sets - 3 reps - 20 hold  - Supine Lower Trunk Rotation  - 1 x daily - 7 x weekly - 3 sets - 10 reps  - Clamshell with Resistance  - 1 x daily - 7 x weekly - 3 sets - 10 reps    Charges     2 TherEx, 1 Neuro Re-ed

## 2025-02-27 ENCOUNTER — APPOINTMENT (OUTPATIENT)
Dept: PHYSICAL THERAPY | Facility: HOSPITAL | Age: 80
End: 2025-02-27
Attending: FAMILY MEDICINE
Payer: MEDICARE

## 2025-02-27 PROCEDURE — 97110 THERAPEUTIC EXERCISES: CPT

## 2025-02-27 NOTE — PROGRESS NOTES
Patient: Dev Johnston (79 year old, male) Referring Provider:  Insurance:   Diagnosis: Right sided sciatica (M54.31) Falling (R29.6) Tim BROOKS   Date of Surgery: -- (laminectomy in January 2024) Next MD visit:  N/A   Precautions:  Fall Risk as needed Referral Information:    Date of Evaluation: Req: 0, Auth: 0, Exp:     02/13/25 POC Auth Visits:          Today's Date   2/27/2025    Subjective  The back has been much better, has not felt this good in years. The past 4 days has been a lot better and he has been working in his shop without any pain.Tried bringing everything a little closer to him while working which seems to help. The pain in the leg is the only thing that is currently hurting. In the morning when he gets out of bed and then getting out of the car hurts the leg. Would like to get back to walking 2 miles (45 min).       Pain: 0/10     Objective  Flexion: 27cm, ext: unremarkable, SB: unremarkable bilaterally, Rot: unremarkable bilaterally.              Assessment  Pt reported increased pain in hips after repeated step ups, reporting immediate relief after SKTC stretch and SB rollouts toward L. Advised to do those exercises at home with onset of pain. Pt asking about getting back to walking outside. Advised to start slowly- no more than 10 minutes and not 2 consecutive days. Pt in agreement.    Goals (to be met in 8 visits)   Goals       Therapy Goals      Not Met Progress Toward Partially Met Met   Pt will improve transversus abdominis recruitment to perform proper isometric contraction without requiring verbal or tactile cuing to promote advancement of therex. [] [] [] []   Pt will demonstrate good understanding of proper posture and body mechanics to decrease pain and improve spinal safety. [] [x] [] []   Pt will report improved symptom centralization and absence of radicular symptoms for 3 consecutive days to improve function with ADLs. [] [x] [] []   Pt will have decreased  paraspinal mm tension to tolerate standing >60 minutes for work and home activities. [] [] [] []   Pt will demonstrate improved core strength to be able to perform sit to stand with <2/10 pain. [] [] [] []   Pt will be independent and compliant with comprehensive HEP to maintain progress achieved in PT [] [] [] []                   Plan  Continue with balance and strengthening as tolerated. Plan for next therapy session: dynamic balance testing    Treatment Last 4 Visits       2/13/2025 2/19/2025 2/27/2025   PT Treatment   Treatment Day  2 3   Therapeutic Exercise SKTC stretch 3 x 20 sec R/L  Lower trunk rotations x 20  SKTC 3 x 30 sec R/L   LTR x 20 R/L  DKTC x 20 R/L   SB bridges 2 x 10 reps   Clamshells YTB 2 x 10 R/L  SB rollouts toward L x 10     Time spent on education of proper body mechanics while working.     NuStep lvl 3 x 5 min   SKTC 3 x 30 sec R/L  LTR x 20 R/L   DKTC x 20 R/L   SB bridges 2 x 8 reps   Forward step up 6 in step 2 x 6 R/L  Lateral step ups 6 in 2 x 5 R/L  SB rollouts toward L x 10    Neuro Re-Ed  Balance assessment as in objective information above:       Therapeutic Exercise Min 10 33 45   Neuro Re-Ed Min  12    Evaluation Min 37     Total of Timed Procedures 10 45 45   Total of Service Based 37 0 0   Total Treatment Time 47 45 45         HEP  Access Code: [email or web address]  URL: [email or web address]  Date: 02/27/2025  Prepared by: Chantal Charlton    Exercises  - Supine Single Knee to Chest Stretch  - 1 x daily - 7 x weekly - 1 sets - 3 reps - 20 hold  - Supine Lower Trunk Rotation  - 1 x daily - 7 x weekly - 3 sets - 10 reps  - Clamshell with Resistance  - 1 x daily - 7 x weekly - 3 sets - 10 reps  (RTB)  - Seated 3 Way Exercise Ball Roll Out Stretch  - 1 x daily - 7 x weekly - 3 sets - 10 reps (toward L only)      Advised to start walking 10 min/day maximum.    Charges     3 TherEx

## 2025-03-04 ENCOUNTER — OFFICE VISIT (OUTPATIENT)
Dept: PHYSICAL THERAPY | Facility: HOSPITAL | Age: 80
End: 2025-03-04
Attending: FAMILY MEDICINE
Payer: MEDICARE

## 2025-03-04 PROCEDURE — 97110 THERAPEUTIC EXERCISES: CPT

## 2025-03-04 PROCEDURE — 97140 MANUAL THERAPY 1/> REGIONS: CPT

## 2025-03-04 NOTE — PROGRESS NOTES
Patient: Dev Johnston (79 year old, male) Referring Provider:  Insurance:   Diagnosis: Right sided sciatica (M54.31) Falling (R29.6) Tim TILLMAN [facility]   Date of Surgery: -- (laminectomy in January 2024) Next MD visit:  N/A   Precautions:  Fall Risk as needed Referral Information:    Date of Evaluation: Req: 0, Auth: 0, Exp:     02/13/25 POC Auth Visits:          Today's Date   3/4/2025    Subjective  The back has continued to get much better, he maybe overdid the exercises and it was sore for a couple days but it feels much better now.  The R leg pain is the most bothersome currently. Getting in/out of car and stairs are most painful.       Pain: 4/10     Objective  Lateral hip glides: mod limitations on R.            Assessment  Pt reporting hip pain after hip abductions, able to decrease completely with SKTC stretch. Tolerated manual intervention well today. Incorporated HS stretch into HEP to address limitations    Goals (to be met in 8 visits)   Therapy Goals          Not Met Progress Toward Partially Met Met   Pt will improve transversus abdominis recruitment to perform proper isometric contraction without requiring verbal or tactile cuing to promote advancement of therex. []  []  []  []    Pt will demonstrate good understanding of proper posture and body mechanics to decrease pain and improve spinal safety. []  [x]  []  []    Pt will report improved symptom centralization and absence of radicular symptoms for 3 consecutive days to improve function with ADLs. []  [x]  []  []    Pt will have decreased paraspinal mm tension to tolerate standing >60 minutes for work and home activities. []  []  [x]  []    Pt will demonstrate improved core strength to be able to perform sit to stand with <2/10 pain. []  []  []  []    Pt will be independent and compliant with comprehensive HEP to maintain progress achieved in PT []  []  []  []           Plan  Continue with strengthening as tolerated, and monitor  pattern of leg symptoms.    Treatment Last 4 Visits       2/13/2025 2/19/2025 2/27/2025 3/4/2025   PT Treatment   Treatment Day  2 3 4   Therapeutic Exercise SKTC stretch 3 x 20 sec R/L  Lower trunk rotations x 20  SKTC 3 x 30 sec R/L   LTR x 20 R/L  DKTC x 20 R/L   SB bridges 2 x 10 reps   Clamshells YTB 2 x 10 R/L  SB rollouts toward L x 10     Time spent on education of proper body mechanics while working.     NuStep lvl 3 x 5 min   SKTC 3 x 30 sec R/L  LTR x 20 R/L   DKTC x 20 R/L   SB bridges 2 x 8 reps   Forward step up 6 in step 2 x 6 R/L  Lateral step ups 6 in 2 x 5 R/L  SB rollouts toward L x 10     Neuro Re-Ed  Balance assessment as in objective information above:        Therapeutic Exercise Min 10 33 45    Neuro Re-Ed Min  12     Evaluation Min 37      Total of Timed Procedures 10 45 45    Total of Service Based 37 0 0    Total Treatment Time 47 45 45           2/19/2025 2/27/2025 3/4/2025   LE Treatment   Treatment Day 2 3 4   Therapeutic Exercise   NuStep lvl 4 x 5 min   SKTC 3 x 30 sec R/L   LTR x 20 R/L   DKTC x 20 R/L   SB bridges 2 x 10 reps   Sidelying clamshells YTB 2 x 10 reps  Seated hamstring stretch 3 x 20 sec on R   Standing hip abduction YTB around knees x 10 R/L   Manual Therapy   Lateral hip glides gr III/IV 3 x 10 reps   Long axis distraction 10 sec hold x 10 (reports relief)  Manual HS stretch 3 x 30 sec R/L   Therapeutic Exercise Minutes   30   Manual Therapy Minutes   15   Total Time Of Timed Procedures   45   Total Time Of Service-Based Procedures   0   Total Treatment Time   45   HEP   Access Code: [email or web address]  URL: [email or web address]  Date: 03/04/2025  Prepared by: Chantal Charlton    Exercises  - Supine Single Knee to Chest Stretch  - 1 x daily - 7 x weekly - 1 sets - 3 reps - 20 hold  - Supine Lower Trunk Rotation  - 1 x daily - 7 x weekly - 3 sets - 10 reps  - Clamshell with Resistance  - 1 x daily - 7 x weekly - 3 sets - 10 reps  - Seated 3 Way Exercise Ball  Roll Out Stretch  - 1 x daily - 7 x weekly - 3 sets - 10 reps  - Seated Hamstring Stretch  - 1 x daily - 7 x weekly - 3 sets - 10 reps        HEP  Access Code: [email or web address]  URL: [email or web address]  Date: 03/04/2025  Prepared by: Chantal Charlton    Exercises  - Supine Single Knee to Chest Stretch  - 1 x daily - 7 x weekly - 1 sets - 3 reps - 20 hold  - Supine Lower Trunk Rotation  - 1 x daily - 7 x weekly - 3 sets - 10 reps  - Clamshell with Resistance  - 1 x daily - 7 x weekly - 3 sets - 10 reps  - Seated 3 Way Exercise Ball Roll Out Stretch  - 1 x daily - 7 x weekly - 3 sets - 10 reps  - Seated Hamstring Stretch  - 1 x daily - 7 x weekly - 3 sets - 10 reps    Charges     1 Manual, 2 TherEx

## 2025-03-06 ENCOUNTER — OFFICE VISIT (OUTPATIENT)
Dept: PHYSICAL THERAPY | Facility: HOSPITAL | Age: 80
End: 2025-03-06
Attending: FAMILY MEDICINE
Payer: MEDICARE

## 2025-03-06 PROCEDURE — 97140 MANUAL THERAPY 1/> REGIONS: CPT

## 2025-03-06 PROCEDURE — 97110 THERAPEUTIC EXERCISES: CPT

## 2025-03-06 NOTE — PROGRESS NOTES
Patient: Dev Johnston (79 year old, male) Referring Provider:  Insurance:   Diagnosis: Right sided sciatica (M54.31) Falling (R29.6) Tim BROOKS   Date of Surgery: -- (laminectomy in January 2024) Next MD visit:  N/A   Precautions:  Fall Risk as needed Referral Information:    Date of Evaluation: Req: 0, Auth: 0, Exp:     02/13/25 POC Auth Visits:          Today's Date   3/6/2025    Subjective  R leg pain is still the worst. It has been painful on and off for the past couple days since he was last here. Did not feel sore after last therapy session. Getting up to walk after sitting a while after. Yesterday was especially bad. Points to posterolateral leg near hamstring.       Pain: 0/10 (with standing after sitting for a while gets  to be 7/10)     Objective  Trigger points to palpation of R lateral hamstring         Assessment  Pt continues to report hip pain when standing after sitting for longer periods. Reproduced pain today when standing, lateral hip glides performed and pt reporting no symptoms upon standing. Will continue to monitor. HEP reviewed and pt demonstrated good understanding.    Goals (to be met in 8 visits)   Therapy Goals          Not Met Progress Toward Partially Met Met   Pt will improve transversus abdominis recruitment to perform proper isometric contraction without requiring verbal or tactile cuing to promote advancement of therex. []  []  []  []    Pt will demonstrate good understanding of proper posture and body mechanics to decrease pain and improve spinal safety. []  [x]  []  []    Pt will report improved symptom centralization and absence of radicular symptoms for 3 consecutive days to improve function with ADLs. []  [x]  []  []    Pt will have decreased paraspinal mm tension to tolerate standing >60 minutes for work and home activities. []  []  [x]  []    Pt will demonstrate improved core strength to be able to perform sit to stand with <2/10 pain. []  []  []  []     Pt will be independent and compliant with comprehensive HEP to maintain progress achieved in PT []  []  []  []               Plan  Continue with hip strengthening, and addressing mobility limitations    Treatment Last 4 Visits       2/19/2025 2/27/2025 3/4/2025 3/6/2025   PT Treatment   Treatment Day 2 3 4 5   Therapeutic Exercise SKTC 3 x 30 sec R/L   LTR x 20 R/L  DKTC x 20 R/L   SB bridges 2 x 10 reps   Clamshells YTB 2 x 10 R/L  SB rollouts toward L x 10     Time spent on education of proper body mechanics while working.     NuStep lvl 3 x 5 min   SKTC 3 x 30 sec R/L  LTR x 20 R/L   DKTC x 20 R/L   SB bridges 2 x 8 reps   Forward step up 6 in step 2 x 6 R/L  Lateral step ups 6 in 2 x 5 R/L  SB rollouts toward L x 10      Neuro Re-Ed Balance assessment as in objective information above:         Therapeutic Exercise Min 33 45     Neuro Re-Ed Min 12      Total of Timed Procedures 45 45     Total of Service Based 0 0     Total Treatment Time 45 45            2/19/2025 2/27/2025 3/4/2025 3/6/2025   LE Treatment   Treatment Day 2 3 4 5   Therapeutic Exercise   NuStep lvl 4 x 5 min   SKTC 3 x 30 sec R/L   LTR x 20 R/L   DKTC x 20 R/L   SB bridges 2 x 10 reps   Sidelying clamshells YTB 2 x 10 reps  Seated hamstring stretch 3 x 20 sec on R   Standing hip abduction YTB around knees x 10 R/L NuStep lvl 4 x 5 min  LTR x 20 R/L   DKTC x 20 R/L   SB bridges 2 x 10 reps   Seated HS stretch 3 x 20 sec hold R/L  Standing hip abductions YTB 2 x 8 R/L   Standing hip ext  2 x 8 R/L    Manual Therapy   Lateral hip glides gr III/IV 3 x 10 reps   Long axis distraction 10 sec hold x 10 (reports relief)  Manual HS stretch 3 x 30 sec R/L Long axis distraction on R 10 sec hold x 10 (reports relief)  Roller to R hamstring/ITB/lateral quad. Trigger points found throughout.  Lateral hip glides on R gr III/IV 3 x 10 reps (performed after onset of pain when standing, pain free when standing after intervention)   Therapeutic Exercise Minutes    30 20   Manual Therapy Minutes   15 25   Total Time Of Timed Procedures   45 45   Total Time Of Service-Based Procedures   0 0   Total Treatment Time   45 45   HEP   Access Code: [email or web address]  URL: [email or web address]  Date: 03/04/2025  Prepared by: Chantal Avalosler    Exercises  - Supine Single Knee to Chest Stretch  - 1 x daily - 7 x weekly - 1 sets - 3 reps - 20 hold  - Supine Lower Trunk Rotation  - 1 x daily - 7 x weekly - 3 sets - 10 reps  - Clamshell with Resistance  - 1 x daily - 7 x weekly - 3 sets - 10 reps  - Seated 3 Way Exercise Ball Roll Out Stretch  - 1 x daily - 7 x weekly - 3 sets - 10 reps  - Seated Hamstring Stretch  - 1 x daily - 7 x weekly - 3 sets - 10 reps Access Code: [email or web address]  URL: [email or web address]  Date: 03/04/2025  Prepared by: Chantal Avalosler     Exercises  - Supine Single Knee to Chest Stretch  - 1 x daily - 7 x weekly - 1 sets - 3 reps - 20 hold  - Supine Lower Trunk Rotation  - 1 x daily - 7 x weekly - 3 sets - 10 reps  - Clamshell with Resistance  - 1 x daily - 7 x weekly - 3 sets - 10 reps  - Seated 3 Way Exercise Ball Roll Out Stretch  - 1 x daily - 7 x weekly - 3 sets - 10 reps  - Seated Hamstring Stretch  - 1 x daily - 7 x weekly - 3 sets - 10 reps          HEP  Access Code: [email or web address]  URL: [email or web address]  Date: 03/04/2025  Prepared by: Chantal Sisler     Exercises  - Supine Single Knee to Chest Stretch  - 1 x daily - 7 x weekly - 1 sets - 3 reps - 20 hold  - Supine Lower Trunk Rotation  - 1 x daily - 7 x weekly - 3 sets - 10 reps  - Clamshell with Resistance  - 1 x daily - 7 x weekly - 3 sets - 10 reps  - Seated 3 Way Exercise Ball Roll Out Stretch  - 1 x daily - 7 x weekly - 3 sets - 10 reps  - Seated Hamstring Stretch  - 1 x daily - 7 x weekly - 3 sets - 10 reps      Charges     2 Manual, 1 TE

## 2025-03-11 ENCOUNTER — OFFICE VISIT (OUTPATIENT)
Dept: PHYSICAL THERAPY | Facility: HOSPITAL | Age: 80
End: 2025-03-11
Attending: FAMILY MEDICINE
Payer: MEDICARE

## 2025-03-11 PROCEDURE — 97140 MANUAL THERAPY 1/> REGIONS: CPT

## 2025-03-11 PROCEDURE — 97110 THERAPEUTIC EXERCISES: CPT

## 2025-03-11 NOTE — PROGRESS NOTES
Patient: Dev Johnston (79 year old, male) Referring Provider:  Insurance:   Diagnosis: Right sided sciatica (M54.31) Falling (R29.6) Tim BROOKS   Date of Surgery: -- (laminectomy in January 2024) Next MD visit:  N/A   Precautions:  Fall Risk as needed Referral Information:    Date of Evaluation: Req: 0, Auth: 0, Exp:     02/13/25 POC Auth Visits:          Today's Date   3/11/2025    Subjective  R leg is feeling pretty good today. Yesterday it was pretty painful, he was out doing some yardwork. Usually sitting helps the leg pain go away, but sit>stand transitions aggravate. Has been doing the exercises consistently. Reports he felt good after last therapy session, no soreness or pain.       Pain: 2/10     Objective  Relief of lumbar/hip pain with core activation during standing exercises.           Assessment  Pt reports hip pain continues to be biggest complaint, able to reproduce during standing exercises. Reduced with cues for abdominal activation throughout and pain alleviated completely with SB rollouts toward L. Inc[employer] core strengthening activities today with reduced pain reported. Pt advised to use core activation strategies throughout day and rolanad during sit>stand transitions which are painful at home. Demonstrated good understanding. HEP updated to include SB bridges.    Goals (to be met in 8 visits)   Therapy Goals          Not Met Progress Toward Partially Met Met   Pt will improve transversus abdominis recruitment to perform proper isometric contraction without requiring verbal or tactile cuing to promote advancement of therex. []  []  []  []    Pt will demonstrate good understanding of proper posture and body mechanics to decrease pain and improve spinal safety. []  [x]  []  []    Pt will report improved symptom centralization and absence of radicular symptoms for 3 consecutive days to improve function with ADLs. []  [x]  []  []    Pt will have decreased paraspinal mm tension  to tolerate standing >60 minutes for work and home activities. []  []  [x]  []    Pt will demonstrate improved core strength to be able to perform sit to stand with <2/10 pain. []  []  []  []    Pt will be independent and compliant with comprehensive HEP to maintain progress achieved in PT []  []  []  []                   Plan  Continue with hip strengthening and core strengthening. Address mobility limitations.    Treatment Last 4 Visits       2/27/2025 3/4/2025 3/6/2025 3/11/2025   PT Treatment   Treatment Day 3 4 5 6   Therapeutic Exercise NuStep lvl 3 x 5 min   SKTC 3 x 30 sec R/L  LTR x 20 R/L   DKTC x 20 R/L   SB bridges 2 x 8 reps   Forward step up 6 in step 2 x 6 R/L  Lateral step ups 6 in 2 x 5 R/L  SB rollouts toward L x 10       Therapeutic Exercise Min 45      Total of Timed Procedures 45      Total of Service Based 0      Total Treatment Time 45             2/27/2025 3/4/2025 3/6/2025 3/11/2025   LE Treatment   Treatment Day 3 4 5 6   Therapeutic Exercise  NuStep lvl 4 x 5 min   SKTC 3 x 30 sec R/L   LTR x 20 R/L   DKTC x 20 R/L   SB bridges 2 x 10 reps   Sidelying clamshells YTB 2 x 10 reps  Seated hamstring stretch 3 x 20 sec on R   Standing hip abduction YTB around knees x 10 R/L NuStep lvl 4 x 5 min  LTR x 20 R/L   DKTC x 20 R/L   SB bridges 2 x 10 reps   Seated HS stretch 3 x 20 sec hold R/L  Standing hip abductions YTB 2 x 8 R/L   Standing hip ext  2 x 8 R/L  Nustep lvl 4 x 5 min   Sidelying clamshells RTB 2 x 10 R/L   SB bridges 2 x 10   SB press downs 3 sec hold 2 x 10   Standing hip abductions YTB 2 x 10   Standing hip extensions YTB 2 x 8   Sit to stands from elevated table height with abdominal activation focus x10  SB rollouts toward L, x 8    Manual Therapy  Lateral hip glides gr III/IV 3 x 10 reps   Long axis distraction 10 sec hold x 10 (reports relief)  Manual HS stretch 3 x 30 sec R/L Long axis distraction on R 10 sec hold x 10 (reports relief)  Roller to R hamstring/ITB/lateral quad.  Trigger points found throughout.  Lateral hip glides on R gr III/IV 3 x 10 reps (performed after onset of pain when standing, pain free when standing after intervention) Long axis distraction 10 sec hold x 10   Manual HS stretch 3 x 30 sec R/L     Therapeutic Exercise Minutes  30 20 35   Manual Therapy Minutes  15 25 10   Total Time Of Timed Procedures  45 45 45   Total Time Of Service-Based Procedures  0 0 0   Total Treatment Time  45 45 45   HEP  Access Code: [email or web address]  URL: [email or web address]  Date: 03/04/2025  Prepared by: Chantal Charlton    Exercises  - Supine Single Knee to Chest Stretch  - 1 x daily - 7 x weekly - 1 sets - 3 reps - 20 hold  - Supine Lower Trunk Rotation  - 1 x daily - 7 x weekly - 3 sets - 10 reps  - Clamshell with Resistance  - 1 x daily - 7 x weekly - 3 sets - 10 reps  - Seated 3 Way Exercise Ball Roll Out Stretch  - 1 x daily - 7 x weekly - 3 sets - 10 reps  - Seated Hamstring Stretch  - 1 x daily - 7 x weekly - 3 sets - 10 reps Access Code: [email or web address]  URL: [email or web address]  Date: 03/04/2025  Prepared by: Chantal Avalosler     Exercises  - Supine Single Knee to Chest Stretch  - 1 x daily - 7 x weekly - 1 sets - 3 reps - 20 hold  - Supine Lower Trunk Rotation  - 1 x daily - 7 x weekly - 3 sets - 10 reps  - Clamshell with Resistance  - 1 x daily - 7 x weekly - 3 sets - 10 reps  - Seated 3 Way Exercise Ball Roll Out Stretch  - 1 x daily - 7 x weekly - 3 sets - 10 reps  - Seated Hamstring Stretch  - 1 x daily - 7 x weekly - 3 sets - 10 reps   Access Code: [email or web address]  URL: [email or web address]  Date: 03/11/2025  Prepared by: Cahntal Avalosler    Exercises  - Supine Single Knee to Chest Stretch  - 1 x daily - 7 x weekly - 1 sets - 3 reps - 20 hold  - Supine Lower Trunk Rotation  - 1 x daily - 7 x weekly - 3 sets - 10 reps  - Clamshell with Resistance  - 1 x daily - 7 x weekly - 3 sets - 10 reps  - Seated 3 Way Exercise Ball Roll Out Stretch   - 1 x daily - 7 x weekly - 3 sets - 10 reps  - Seated Hamstring Stretch  - 1 x daily - 7 x weekly - 3 sets - 10 reps  - Bridge with Heels on Swiss Ball  - 1 x daily - 7 x weekly - 3 sets - 10 reps        HEP  Access Code: [email or web address]  URL: [email or web address]  Date: 03/11/2025  Prepared by: Chantal Charlton    Exercises  - Supine Single Knee to Chest Stretch  - 1 x daily - 7 x weekly - 1 sets - 3 reps - 20 hold  - Supine Lower Trunk Rotation  - 1 x daily - 7 x weekly - 3 sets - 10 reps  - Clamshell with Resistance  - 1 x daily - 7 x weekly - 3 sets - 10 reps  - Seated 3 Way Exercise Ball Roll Out Stretch  - 1 x daily - 7 x weekly - 3 sets - 10 reps  - Seated Hamstring Stretch  - 1 x daily - 7 x weekly - 3 sets - 10 reps  - Bridge with Heels on Swiss Ball  - 1 x daily - 7 x weekly - 3 sets - 10 reps    Charges     1 Man, 2 TE

## 2025-03-12 ENCOUNTER — OFFICE VISIT (OUTPATIENT)
Dept: NEUROLOGY | Facility: CLINIC | Age: 80
End: 2025-03-12
Payer: MEDICARE

## 2025-03-12 VITALS
HEART RATE: 65 BPM | HEIGHT: 72 IN | SYSTOLIC BLOOD PRESSURE: 130 MMHG | RESPIRATION RATE: 16 BRPM | DIASTOLIC BLOOD PRESSURE: 68 MMHG | BODY MASS INDEX: 22.48 KG/M2 | WEIGHT: 166 LBS

## 2025-03-12 DIAGNOSIS — G25.3 MYOCLONIC JERKING WHILE SLEEPING: ICD-10-CM

## 2025-03-12 DIAGNOSIS — R20.8 DECREASED VIBRATORY SENSE: ICD-10-CM

## 2025-03-12 DIAGNOSIS — R26.81 UNSTEADY GAIT: ICD-10-CM

## 2025-03-12 DIAGNOSIS — G25.81 RESTLESS LEGS SYNDROME: ICD-10-CM

## 2025-03-12 DIAGNOSIS — R29.2: ICD-10-CM

## 2025-03-12 DIAGNOSIS — R29.2 HYPER REFLEXIA: Primary | ICD-10-CM

## 2025-03-12 PROCEDURE — 1160F RVW MEDS BY RX/DR IN RCRD: CPT | Performed by: OTHER

## 2025-03-12 PROCEDURE — 1159F MED LIST DOCD IN RCRD: CPT | Performed by: OTHER

## 2025-03-12 PROCEDURE — 99204 OFFICE O/P NEW MOD 45 MIN: CPT | Performed by: OTHER

## 2025-03-12 RX ORDER — GABAPENTIN 100 MG/1
CAPSULE ORAL
Qty: 60 CAPSULE | Refills: 0 | Status: SHIPPED | OUTPATIENT
Start: 2025-03-12

## 2025-03-12 NOTE — H&P
[facility] New Patient / Consult Visit    Dev Johnston is a 79 year old male.                         Referring MD: No ref. provider found    Chief Complaint   Patient presents with    Neurologic Problem     Referred by PCP, C/O RLS, during the day/worse at night, spouse notes \"jerking/kicking\"       HPI:    Dev Johnston is a 79 year old, who presents for evaluation of abnormal jerking at night.     He states he has some R leg pain since 12/2024 but denies injury and feels this is \"sciatic pain\" - denies tingling radiating down to feet. He has been having jerking movements for the past 2 yrs and also had spinal surgery ~ 1 yr ago after having persistent localized low back pain. He denies tripping over feet but has some balance issues for the past few years and had fall 12/2024.  He denies numbness/ tingling in hands or feet or tripping over feet or dropping objects from hands.    He has a feeling of electrical sensation in his knees / legs on both sides when he sits in his chair at night or when he is going to sleep. He has tried taking gabapentin prior to sleep and had been increased up to 300 mg nightly and was also tried on pramipexole but neither of these worked and pramipexole caused cramps. He does notes symptoms mainly occur at night.     Otherwise, patient denies any recent weight change, fevers, chills, nausea, double vision/ blurry vision / loss of vision, chest pain, palpitations, shortness of breath, rashes, joint pains, bowel / bladder incontinence or mood issues.     Past Medical History:    Adverse drug reaction    Age-related cataract of both eyes    Arthritis    Asthma (HCC)    Back pain    Back problem    Benign neoplasm of colon    BPH (benign prostatic hyperplasia)    Calculus of kidney    COPD (chronic obstructive pulmonary disease) ([facility])    NO HOME O2    Coronary atherosclerosis    Diverticulosis of colon (without mention of hemorrhage)    Easy bruising     Esophageal reflux    Flatulence/gas pain/belching    Frequent urination    Frequent use of laxatives    Heartburn    Hemorrhoids    High blood pressure    High cholesterol    History of cardiac murmur    Hx of heart artery stent    Prostate nodule    Had biopsy 2016    Stented coronary artery    one    Unspecified hemorrhoids without mention of complication    Visual impairment    READING GLASSES    Wears glasses    Wheezing     Past Surgical History:   Procedure Laterality Date    Angioplasty (coronary)  2011    Appendectomy  1949    Cataract Bilateral 2021    cataract removed    Colonoscopy  2012    + polyps    Insert intracoronary stent      Other surgical history  2022    Cystoscopy Dr. Olmos     Other surgical history  2024    LUMBAR 3 AND LUMBAR 4 LAMINECTOMIES WITH MEDIAL FACETECTOMIES    Tonsillectomy      Upper gi endoscopy - referral  2012    esophagitis, repeat in 5 year     Social History     Socioeconomic History    Marital status: Life Partner   Tobacco Use    Smoking status: Former     Current packs/day: 0.00     Average packs/day: 1 pack/day for 43.3 years (43.3 ttl pk-yrs)     Types: Cigarettes     Start date: 1968     Quit date: 2011     Years since quittin.8    Smokeless tobacco: Never   Vaping Use    Vaping status: Never Used   Substance and Sexual Activity    Alcohol use: No     Comment: Quit in . Prior had been a heavier drinker.    Drug use: Not Currently   Other Topics Concern    Caffeine Concern Yes    Exercise Yes     Comment: 6x/week     Social Drivers of Health     Food Insecurity: No Food Insecurity (2024)    Food Insecurity     Food Insecurity: Never true   Transportation Needs: No Transportation Needs (2024)    Transportation Needs     Lack of Transportation: No   Housing Stability: Low Risk  (2024)    Housing Stability     Housing Instability: No     Family History   Problem Relation Age of Onset    Breast Cancer Sister      Other (CABG) Father         x4    Other (esophogeal reflux) Mother     Other (hip replacement) Mother        Allergies:  Allergies[1]   Current Meds:  Current Outpatient Medications   Medication Sig Dispense Refill    gabapentin 100 MG Oral Cap Take 100 mg ( 1 capsule) ~ 5 PM and 100 mg (1 capsule) ~ 9 PM prior to bedtime 60 capsule 0    sertraline 100 MG Oral Tab Take 1 tablet (100 mg total) by mouth daily. 90 tablet 1    silodosin (RAPAFLO) 8 MG Oral Cap Take 1 capsule (8 mg total) by mouth daily. 90 capsule 5    finasteride 5 MG Oral Tab Take 1 tablet (5 mg total) by mouth daily. 90 tablet 5    Omeprazole 20 MG Oral Tab EC omeprazole 20 mg tablet,delayed release, [RxNorm: 161469]      simvastatin 20 MG Oral Tab TAKE 1 TABLET BY MOUTH EVERY NIGHT AT BEDTIME 90 tablet 3    albuterol 108 (90 Base) MCG/ACT Inhalation Aero Soln Inhale 2 puffs into the lungs every 6 (six) hours as needed for Wheezing. 1 each 3    Meloxicam 15 MG Oral Tab Take 1 tablet (15 mg total) by mouth daily. 90 tablet 1    TRELEGY ELLIPTA 200-62.5-25 MCG/ACT Inhalation Aerosol Powder, Breath Activated Inhale 1 puff into the lungs daily. 3 each 3    aspirin (EQ ASPIRIN ADULT LOW DOSE) 81 MG Oral Tab EC Aspirin 81 MG Oral Tab, [RxNorm: 281051]      Sildenafil Citrate 100 MG Oral Tab Take 1 tablet (100 mg total) by mouth as needed for Erectile Dysfunction. 4 tablet 11          ROS:   A comprehensive 10 point review of systems was completed.  Pertinent positives and negatives noted in the the HPI.      PHYSICAL EXAM:   /68 (BP Location: Left arm, Patient Position: Sitting, Cuff Size: adult)   Pulse 65   Resp 16   Ht 72\"   Wt 166 lb (75.3 kg)   BMI 22.51 kg/m²   Estimated body mass index is 22.51 kg/m² as calculated from the following:    Height as of this encounter: 72\".    Weight as of this encounter: 166 lb (75.3 kg).    GENERAL: well developed, well nourished, in no apparent distress  SKIN: no rashes  EYES: sclera anicteric,  conjunctiva normal  HEENT: normocephalic  CARDIOVASCULAR: S1, S2 normal, RRR  LUNGS: clear to auscultation bilaterally  EXTREMITIES: no cyanosis, peripheral pulses intact    Neck: Supple; full range of motion; no carotid bruits    Mental status:  Alert and oriented to time, place, person, and situation  Speech: fluent  Language: normal naming, repetition, and comprehension  Memory: normal  Attention/concentration: normal    Fundoscopic Exam: optic discs sharp bilaterally    Cranial Nerves: II through XII  Optic:    Pupils: equally round and reactive to light with direct and consensual responses, normal accomodation   Visual acuity: Normal              Visual fields: Normal  Oculomotor/Trochlear/Abducens:    Eye Movements: EOMI without nystagmus  Trigeminal:   Facial sensation:intact to light touch bilaterally  Facial:   Smile symmetric, eyebrow raise symmetric  Vestibulocochlear:   Hearing: normal bilaterally  Glossopharyngeal/Vagus:   Palate elevates symmetrically with midline uvula  Spinal accessory:   Shoulder Shrug: normal bilaterally   Lateral head turn: normal bilaterally  Hypoglossal:   Tongue movement: protrusion is midline with normal lateral movements    Motor System:  Strength: 5/5 throughout  Tone: normal    Sensory:  Pin is normal  Vibration is reduced ~2 sec left great toe and 3 on R and 7 in UE at thumbs  Proprioception is normal  Romberg is absent    Coordination:  Finger to nose normal bilaterally  Rapid alternating movements normal bilaterally  Heel to shin is normal bilaterally    DTRs:   3+ brisk throughout including UE with +turner's left side; toes dowgoing bilaterally; no clonus          Gait:  Normal casual, heel, toe gait but unable to tandem (very off balance)    TEST RESULTS/DATA REVIEWED:     Labs, imaging and notes reviewed    IMPRESSION AND PLAN:   Dev Johnston is a 79 year old male who presents for evaluation of abnormal jerking at night.     He states he has some R leg pain  since 12/2024 but denies injury and feels this is \"sciatic pain\" - denies tingling radiating down to feet. He has been having jerking movements for the past 2 yrs and also had spinal surgery ~ 1 yr ago after having persistent localized low back pain. He denies tripping over feet but has some balance issues for the past few years and had fall 12/2024.  He denies numbness/ tingling in hands or feet or tripping over feet or dropping objects from hands.    He has a feeling of electrical sensation in his knees / legs on both sides when he sits in his chair at night or when he is going to sleep. He has tried taking gabapentin prior to sleep and had been increased up to 300 mg nightly and was also tried on pramipexole but neither of these worked and pramipexole caused cramps. He does note symptoms mainly occur at night.      Neurologic exam shows brisk DTRs with +cooper's sign, very unsteady gait and reduced vibratory sensation - this could be due to low B12 or upper motor neuron pathology in brain or cervical spine and recommend MRI brain and cervical spine to evaluate.    In addition, symptoms overall suggestive of a combination of myoclonic jerks and restless legs syndrome - recommend try gabapentin in divided dose at 100 mg ~5 PM and 100 mg ~9 PM prior to bedtime. In addition, given he did not respond well to gabapentin or pramipexole in the past, he could have concurrent iron deficiency which could also contribute to restless legs symptoms - will check iron studies.     1. Hyper reflexia  As noted above   - MRI BRAIN (CPT=70551); Future  - MRI SPINE CERVICAL (CPT=72141) [[id number]]; Future  - Vitamin B12    2. Cooper's reflex positive  As noted above   - MRI BRAIN (CPT=70551); Future  - MRI SPINE CERVICAL (CPT=72141) [[id number]]; Future    3. Myoclonic jerking while sleeping  As noted above   - MRI BRAIN (CPT=70551); Future  - MRI SPINE CERVICAL (CPT=72141) [[id number]]; Future  - gabapentin 100 MG Oral Cap; Take 100 mg (  1 capsule) ~ 5 PM and 100 mg (1 capsule) ~ 9 PM prior to bedtime  Dispense: 60 capsule; Refill: 0  - Vitamin B12  - Iron And Tibc  - Ferritin    4. Decreased vibratory sense  As noted above   - Vitamin B12    5. Unsteady gait  As noted above   - Vitamin B12    6. Restless legs syndrome  As noted above   - Iron And Tibc  - Ferritin    Return in about 3 months (around 6/12/2025).    Copy of note was sent to referring physician.      Geoff Cespedes MD, Neurology  [facility]  Pager [phone]  3/12/2025           [1]   Allergies  Allergen Reactions    Cephalexin DIZZINESS and SHORTNESS OF BREATH

## 2025-03-12 NOTE — PATIENT INSTRUCTIONS
Refill policies:    Allow 2-3 business days for refills; controlled substances may take longer.  Contact your pharmacy at least 5 days prior to running out of medication and have them send an electronic request or submit request through the “request refill” option in your [employer] account.  Refills are not addressed on weekends; covering physicians do not authorize routine medications on weekends.  No narcotics or controlled substances are refilled after noon on Fridays or by on call physicians.  By law, narcotics must be electronically prescribed.  A 30 day supply with no refills is the maximum allowed.  If your prescription is due for a refill, you may be due for a follow up appointment.  To best provide you care, patients receiving routine medications need to be seen at least once a year.  Patients receiving narcotic/controlled substance medications need to be seen at least once every 3 months.  In the event that your preferred pharmacy does not have the requested medication in stock (e.g. Backordered), it is your responsibility to find another pharmacy that has the requested medication available.  We will gladly send a new prescription to that pharmacy at your request.    Scheduling Tests:    If your physician has ordered radiology tests such as MRI or CT scans, please contact Central Scheduling at [phone] right away to schedule the test.  Once scheduled, the Count includes the [facility] Centralized Referral Team will work with your insurance carrier to obtain pre-certification or prior authorization.  Depending on your insurance carrier, approval may take 3-10 days.  It is highly recommended patients assure they have received an authorization before having a test performed.  If test is done without insurance authorization, patient may be responsible for the entire amount billed.      Precertification and Prior Authorizations:  If your physician has recommended that you have a procedure or additional testing performed the Count includes the [facility]  Centralized Referral Team will contact your insurance carrier to obtain pre-certification or prior authorization.    You are strongly encouraged to contact your insurance carrier to verify that your procedure/test has been approved and is a COVERED benefit.  Although the [facility] Centralized Referral Team does its due diligence, the insurance carrier gives the disclaimer that \"Although the procedure is authorized, this does not guarantee payment.\"    Ultimately the patient is responsible for payment.   Thank you for your understanding in this matter.  Paperwork Completion:  If you require FMLA or disability paperwork for your recovery, please make sure to either drop it off or have it faxed to our office at [phone]. Be sure the form has your name and date of birth on it.  The form will be faxed to our Forms Department and they will complete it for you.  There is a 25$ fee for all forms that need to be filled out.  Please be aware there is a 10-14 day turnaround time.  You will need to sign a release of information (BRIEN) form if your paperwork does not come with one.  You may call the Forms Department with any questions at [phone].  Their fax number is [phone].

## 2025-03-13 ENCOUNTER — LAB ENCOUNTER (OUTPATIENT)
Dept: LAB | Age: 80
End: 2025-03-13
Attending: Other
Payer: MEDICARE

## 2025-03-13 LAB
DEPRECATED HBV CORE AB SER IA-ACNC: 11 NG/ML
IRON SATN MFR SERPL: 5 %
IRON SERPL-MCNC: 22 UG/DL
TOTAL IRON BINDING CAPACITY: 430 UG/DL (ref 250–425)
TRANSFERRIN SERPL-MCNC: 332 MG/DL (ref 215–365)
VIT B12 SERPL-MCNC: 1092 PG/ML (ref 211–911)

## 2025-03-13 PROCEDURE — 83540 ASSAY OF IRON: CPT | Performed by: OTHER

## 2025-03-13 PROCEDURE — 82607 VITAMIN B-12: CPT | Performed by: OTHER

## 2025-03-13 PROCEDURE — 82728 ASSAY OF FERRITIN: CPT | Performed by: OTHER

## 2025-03-13 PROCEDURE — 36415 COLL VENOUS BLD VENIPUNCTURE: CPT | Performed by: OTHER

## 2025-03-13 PROCEDURE — 83550 IRON BINDING TEST: CPT | Performed by: OTHER

## 2025-03-17 ENCOUNTER — LAB ENCOUNTER (OUTPATIENT)
Dept: LAB | Age: 80
End: 2025-03-17
Attending: FAMILY MEDICINE
Payer: MEDICARE

## 2025-03-17 ENCOUNTER — TELEPHONE (OUTPATIENT)
Dept: FAMILY MEDICINE CLINIC | Facility: CLINIC | Age: 80
End: 2025-03-17

## 2025-03-17 ENCOUNTER — TELEPHONE (OUTPATIENT)
Dept: NEUROLOGY | Facility: CLINIC | Age: 80
End: 2025-03-17

## 2025-03-17 DIAGNOSIS — D50.9 IRON DEFICIENCY ANEMIA, UNSPECIFIED IRON DEFICIENCY ANEMIA TYPE: Primary | ICD-10-CM

## 2025-03-17 LAB
BASOPHILS # BLD AUTO: 0.04 X10(3) UL (ref 0–0.2)
BASOPHILS NFR BLD AUTO: 0.9 %
EOSINOPHIL # BLD AUTO: 0.3 X10(3) UL (ref 0–0.7)
EOSINOPHIL NFR BLD AUTO: 6.6 %
ERYTHROCYTE [DISTWIDTH] IN BLOOD BY AUTOMATED COUNT: 15.9 %
HCT VFR BLD AUTO: 28.6 %
HGB BLD-MCNC: 9 G/DL
IMM GRANULOCYTES # BLD AUTO: 0.01 X10(3) UL (ref 0–1)
IMM GRANULOCYTES NFR BLD: 0.2 %
LYMPHOCYTES # BLD AUTO: 0.6 X10(3) UL (ref 1–4)
LYMPHOCYTES NFR BLD AUTO: 13.2 %
MCH RBC QN AUTO: 26.7 PG (ref 26–34)
MCHC RBC AUTO-ENTMCNC: 31.5 G/DL (ref 31–37)
MCV RBC AUTO: 84.9 FL
MONOCYTES # BLD AUTO: 0.56 X10(3) UL (ref 0.1–1)
MONOCYTES NFR BLD AUTO: 12.3 %
NEUTROPHILS # BLD AUTO: 3.03 X10 (3) UL (ref 1.5–7.7)
NEUTROPHILS # BLD AUTO: 3.03 X10(3) UL (ref 1.5–7.7)
NEUTROPHILS NFR BLD AUTO: 66.8 %
PLATELET # BLD AUTO: 200 10(3)UL (ref 150–450)
RBC # BLD AUTO: 3.37 X10(6)UL
WBC # BLD AUTO: 4.5 X10(3) UL (ref 4–11)

## 2025-03-17 PROCEDURE — 36415 COLL VENOUS BLD VENIPUNCTURE: CPT | Performed by: FAMILY MEDICINE

## 2025-03-17 PROCEDURE — 85025 COMPLETE CBC W/AUTO DIFF WBC: CPT | Performed by: FAMILY MEDICINE

## 2025-03-17 NOTE — TELEPHONE ENCOUNTER
Patient's life partner Adrienne called, they are schedule to go on vacation tomorrow. She sees that Skip's test results show low on iron. May need an infusion for iron. If doctor wants infusion, is there a possibility to have that done this week and if so, they can cancel their trip. Please [phone] today since they need to know whether to cancel mariam or not.

## 2025-03-17 NOTE — TELEPHONE ENCOUNTER
Please review labs and advise.   Per Dr. Cespedes's office ferrous sulfate 325 mg BID. Follow-up with PCP for further testing and recommendations (if patient requires infusions).

## 2025-03-17 NOTE — TELEPHONE ENCOUNTER
Really depends on the CBC, oral iron is plenty if blood counts are near normal or higher than 10-11. If hemoglobin were lower, then would want to move quicker to treat/figure things out.    Tim Mujica MD

## 2025-03-17 NOTE — TELEPHONE ENCOUNTER
Keep taking the iron, I would want a CBC, as I really hadn't expected iron deficiency, but it is a definite cause for RLS, but I hadn't expected long held RLS to be from this.    I want the CBC, because we will need to consider the source of iron deficiency, and severity of hemoglobin drop can help figure out next steps

## 2025-03-17 NOTE — TELEPHONE ENCOUNTER
Notified Adrienne,  she will take pt now to get CBC drawn.      Is it okay for pt to go out of town x 1 week or will he need iron transfusion asap?

## 2025-03-17 NOTE — TELEPHONE ENCOUNTER
How long is his trip? Hgb 10.4 6 months ago, likely ok to go on a trip.    I'd recommend occult blood stool test, and repeat cbc in 1-2 weeks pending length of time gone.    Tim Mujica MD

## 2025-03-17 NOTE — TELEPHONE ENCOUNTER
Patients partner calling and states pt saw Dr. Cespedes   When his labs came back his iron was very low.   Pt was to call Dr. Mujica to look at labs and see what he wants patient to do.   Pt is supposed to drive out of town tomorrow.   The trip is on hold for now   Please advise

## 2025-03-17 NOTE — TELEPHONE ENCOUNTER
Per Dr. Cespedes ferrous sulfate 325 mg BID. Follow-up with PCP for further testing and recommendations (if patient requires infusions).    Adrienne notifed, all questions answered.

## 2025-03-17 NOTE — TELEPHONE ENCOUNTER
Lab results are in,  okay for pt to get oral iron and go on his trip and when he returns to do iron transfusion if needed? Please advise

## 2025-03-18 NOTE — TELEPHONE ENCOUNTER
I responded to the CBC, in a previous note, but it would have been after people left, I think probably ok to go on a trip and recheck CBC on return with oral iron, but was wondering how long the trip was.    Tim Mujica MD

## 2025-03-18 NOTE — TELEPHONE ENCOUNTER
Leaving tomorrow driving to [place], come back Sunday.      Start oral iron now or when he gets back? Or start infusion instead?

## 2025-03-25 ENCOUNTER — LAB ENCOUNTER (OUTPATIENT)
Dept: LAB | Age: 80
End: 2025-03-25
Attending: FAMILY MEDICINE
Payer: MEDICARE

## 2025-03-25 LAB
BASOPHILS # BLD AUTO: 0.04 X10(3) UL (ref 0–0.2)
BASOPHILS NFR BLD AUTO: 0.9 %
EOSINOPHIL # BLD AUTO: 0.52 X10(3) UL (ref 0–0.7)
EOSINOPHIL NFR BLD AUTO: 11.7 %
ERYTHROCYTE [DISTWIDTH] IN BLOOD BY AUTOMATED COUNT: 18.1 %
HCT VFR BLD AUTO: 30.4 %
HGB BLD-MCNC: 9.4 G/DL
IMM GRANULOCYTES # BLD AUTO: 0.02 X10(3) UL (ref 0–1)
IMM GRANULOCYTES NFR BLD: 0.5 %
LYMPHOCYTES # BLD AUTO: 0.76 X10(3) UL (ref 1–4)
LYMPHOCYTES NFR BLD AUTO: 17.1 %
MCH RBC QN AUTO: 27.2 PG (ref 26–34)
MCHC RBC AUTO-ENTMCNC: 30.9 G/DL (ref 31–37)
MCV RBC AUTO: 87.9 FL
MONOCYTES # BLD AUTO: 0.51 X10(3) UL (ref 0.1–1)
MONOCYTES NFR BLD AUTO: 11.5 %
NEUTROPHILS # BLD AUTO: 2.59 X10 (3) UL (ref 1.5–7.7)
NEUTROPHILS # BLD AUTO: 2.59 X10(3) UL (ref 1.5–7.7)
NEUTROPHILS NFR BLD AUTO: 58.3 %
PLATELET # BLD AUTO: 211 10(3)UL (ref 150–450)
RBC # BLD AUTO: 3.46 X10(6)UL
WBC # BLD AUTO: 4.4 X10(3) UL (ref 4–11)

## 2025-03-25 PROCEDURE — 36415 COLL VENOUS BLD VENIPUNCTURE: CPT | Performed by: FAMILY MEDICINE

## 2025-03-25 PROCEDURE — 85025 COMPLETE CBC W/AUTO DIFF WBC: CPT | Performed by: FAMILY MEDICINE

## 2025-03-26 ENCOUNTER — OFFICE VISIT (OUTPATIENT)
Dept: PHYSICAL THERAPY | Facility: HOSPITAL | Age: 80
End: 2025-03-26
Attending: FAMILY MEDICINE
Payer: MEDICARE

## 2025-03-26 ENCOUNTER — TELEPHONE (OUTPATIENT)
Dept: PHYSICAL THERAPY | Facility: HOSPITAL | Age: 80
End: 2025-03-26

## 2025-03-26 PROCEDURE — 97110 THERAPEUTIC EXERCISES: CPT

## 2025-03-26 PROCEDURE — 97140 MANUAL THERAPY 1/> REGIONS: CPT

## 2025-03-26 NOTE — PROGRESS NOTES
Patient: Dev Johnston (79 year old, male) Referring Provider:  Insurance:   Diagnosis: Right sided sciatica (M54.31) Falling (R29.6) Tim BROOKS   Date of Surgery: -- (laminectomy in January 2024) Next MD visit:  N/A   Precautions:  Fall Risk as needed Referral Information:    Date of Evaluation: Req: 0, Auth: 0, Exp:     02/13/25 POC Auth Visits:          Today's Date   3/26/2025    Subjective  Today the R leg is pretty sore. Still sore when he walks for long periods of time. Sitting down helps reduce the pain. Has been trying the exercises consistently at home, reports some pain after multiple repetitions of clamshell.       Pain: 4/10 (when walking for long periods of time)     Objective  Relief of hip pain with core activation during exercises and with SKTC stretch            Assessment  Pt having hip pain during therapy session, reduced with core activtaion and SKTC stretches today. Advised to implement at home when feeling pain.    Goals (to be met in 8 visits)   Therapy Goals          Not Met Progress Toward Partially Met Met   Pt will improve transversus abdominis recruitment to perform proper isometric contraction without requiring verbal or tactile cuing to promote advancement of therex. []  []  []  []    Pt will demonstrate good understanding of proper posture and body mechanics to decrease pain and improve spinal safety. []  [x]  []  []    Pt will report improved symptom centralization and absence of radicular symptoms for 3 consecutive days to improve function with ADLs. []  [x]  []  []    Pt will have decreased paraspinal mm tension to tolerate standing >60 minutes for work and home activities. []  []  [x]  []    Pt will demonstrate improved core strength to be able to perform sit to stand with <2/10 pain. []  []  []  []    Pt will be independent and compliant with comprehensive HEP to maintain progress achieved in PT []  []  []  []                       Plan  Continue with hip  strengthening and core strengthening. Address mobility limitations.    Treatment Last 4 Visits  Treatment Day: 7       3/4/2025 3/6/2025 3/11/2025 3/26/2025   LE Treatment   Therapeutic Exercise NuStep lvl 4 x 5 min   SKTC 3 x 30 sec R/L   LTR x 20 R/L   DKTC x 20 R/L   SB bridges 2 x 10 reps   Sidelying clamshells YTB 2 x 10 reps  Seated hamstring stretch 3 x 20 sec on R   Standing hip abduction YTB around knees x 10 R/L NuStep lvl 4 x 5 min  LTR x 20 R/L   DKTC x 20 R/L   SB bridges 2 x 10 reps   Seated HS stretch 3 x 20 sec hold R/L  Standing hip abductions YTB 2 x 8 R/L   Standing hip ext  2 x 8 R/L  Nustep lvl 4 x 5 min   Sidelying clamshells RTB 2 x 10 R/L   SB bridges 2 x 10   SB press downs 3 sec hold 2 x 10   Standing hip abductions YTB 2 x 10   Standing hip extensions YTB 2 x 8   Sit to stands from elevated table height with abdominal activation focus x10  SB rollouts toward L, x 8  SB press downs x15  Hooklying hip adduction with ball 2 x 10 reps   Shuttle DLP 50# 2 x 12   Standing hip abductions YTB 2 x 10   Standing hip extensions YTB 2 x 8   SKTC 3 x 30 sec R   Manual Therapy Lateral hip glides gr III/IV 3 x 10 reps   Long axis distraction 10 sec hold x 10 (reports relief)  Manual HS stretch 3 x 30 sec R/L Long axis distraction on R 10 sec hold x 10 (reports relief)  Roller to R hamstring/ITB/lateral quad. Trigger points found throughout.  Lateral hip glides on R gr III/IV 3 x 10 reps (performed after onset of pain when standing, pain free when standing after intervention) Long axis distraction 10 sec hold x 10   Manual HS stretch 3 x 30 sec R/L   Long axis distraction 10 sec holds x 10    Therapeutic Exercise Minutes 30 20 35 32   Manual Therapy Minutes 15 25 10 10   Total Time Of Timed Procedures 45 45 45 42   Total Time Of Service-Based Procedures 0 0 0 0   Total Treatment Time 45 45 45 42   HEP Access Code: [email or web address]  URL: [email or web address]  Date: 03/04/2025  Prepared by:  Chantal Sisler    Exercises  - Supine Single Knee to Chest Stretch  - 1 x daily - 7 x weekly - 1 sets - 3 reps - 20 hold  - Supine Lower Trunk Rotation  - 1 x daily - 7 x weekly - 3 sets - 10 reps  - Clamshell with Resistance  - 1 x daily - 7 x weekly - 3 sets - 10 reps  - Seated 3 Way Exercise Ball Roll Out Stretch  - 1 x daily - 7 x weekly - 3 sets - 10 reps  - Seated Hamstring Stretch  - 1 x daily - 7 x weekly - 3 sets - 10 reps Access Code: [email or web address]  URL: [email or web address]  Date: 03/04/2025  Prepared by: Chantal Sisler     Exercises  - Supine Single Knee to Chest Stretch  - 1 x daily - 7 x weekly - 1 sets - 3 reps - 20 hold  - Supine Lower Trunk Rotation  - 1 x daily - 7 x weekly - 3 sets - 10 reps  - Clamshell with Resistance  - 1 x daily - 7 x weekly - 3 sets - 10 reps  - Seated 3 Way Exercise Ball Roll Out Stretch  - 1 x daily - 7 x weekly - 3 sets - 10 reps  - Seated Hamstring Stretch  - 1 x daily - 7 x weekly - 3 sets - 10 reps   Access Code: [email or web address]  URL: [email or web address]  Date: 03/11/2025  Prepared by: Chantal Sisler    Exercises  - Supine Single Knee to Chest Stretch  - 1 x daily - 7 x weekly - 1 sets - 3 reps - 20 hold  - Supine Lower Trunk Rotation  - 1 x daily - 7 x weekly - 3 sets - 10 reps  - Clamshell with Resistance  - 1 x daily - 7 x weekly - 3 sets - 10 reps  - Seated 3 Way Exercise Ball Roll Out Stretch  - 1 x daily - 7 x weekly - 3 sets - 10 reps  - Seated Hamstring Stretch  - 1 x daily - 7 x weekly - 3 sets - 10 reps  - Bridge with Heels on Swiss Ball  - 1 x daily - 7 x weekly - 3 sets - 10 reps         HEP  Access Code: [email or web address]  URL: [email or web address]  Date: 03/11/2025  Prepared by: Chantal Sisler    Exercises  - Supine Single Knee to Chest Stretch  - 1 x daily - 7 x weekly - 1 sets - 3 reps - 20 hold  - Supine Lower Trunk Rotation  - 1 x daily - 7 x weekly - 3 sets - 10 reps  - Clamshell with Resistance  - 1 x daily - 7  x weekly - 3 sets - 10 reps  - Seated 3 Way Exercise Ball Roll Out Stretch  - 1 x daily - 7 x weekly - 3 sets - 10 reps  - Seated Hamstring Stretch  - 1 x daily - 7 x weekly - 3 sets - 10 reps  - Bridge with Heels on Swiss Ball  - 1 x daily - 7 x weekly - 3 sets - 10 reps    Charges     1 Man, 2 TE

## 2025-03-28 ENCOUNTER — TELEPHONE (OUTPATIENT)
Dept: FAMILY MEDICINE CLINIC | Facility: CLINIC | Age: 80
End: 2025-03-28

## 2025-03-28 DIAGNOSIS — Z12.11 SCREEN FOR COLON CANCER: Primary | ICD-10-CM

## 2025-03-28 DIAGNOSIS — R79.0 DECREASED FERRITIN: ICD-10-CM

## 2025-03-28 DIAGNOSIS — D64.9 ANEMIA, UNSPECIFIED TYPE: ICD-10-CM

## 2025-03-28 NOTE — TELEPHONE ENCOUNTER
Continue oral iron and still would want to check and occult blood.    But as blood count trending up, no need to do more aggressive treatment yet    Tim Mujica MD

## 2025-03-31 ENCOUNTER — OFFICE VISIT (OUTPATIENT)
Dept: PHYSICAL THERAPY | Facility: HOSPITAL | Age: 80
End: 2025-03-31
Attending: FAMILY MEDICINE
Payer: MEDICARE

## 2025-03-31 PROCEDURE — 97140 MANUAL THERAPY 1/> REGIONS: CPT

## 2025-03-31 PROCEDURE — 97110 THERAPEUTIC EXERCISES: CPT

## 2025-03-31 NOTE — TELEPHONE ENCOUNTER
Spoke with spouse. She is inquiring about possibility of IV iron infusions as recommended by neurologist. States Skip is deteriorating. Fatigue. Legs are weak. Unable to sleep. Often dizzy. She will have him complete FIT study as suggested. Please advise in regard to IV iron.  Thanks.

## 2025-03-31 NOTE — TELEPHONE ENCOUNTER
If doing worse should get the FIT test and also a repeat CBC sooner rather than later.    I need that fit test to see if we need to see hematology or gastroenterology as a next step. IV iron can be helpful but is hard to make happen in primary care.    But if there is a bleed we need to deal with it first    Tim Mujica MD

## 2025-03-31 NOTE — PROGRESS NOTES
Patient: Dev Johnston (79 year old, male) Referring Provider:  Insurance:   Diagnosis: Right sided sciatica (M54.31) Falling (R29.6) Tim BROOKS   Date of Surgery: -- (laminectomy in January 2024) Next MD visit:  N/A   Precautions:  Fall Risk as needed Referral Information:    Date of Evaluation: Req: 0, Auth: 0, Exp:     02/13/25 POC Auth Visits:          Today's Date   3/31/2025     Progress Summary  Pt has attended 8 visits in Physical Therapy.     Subjective  The leg is feeling a little better compared to last time. Still difficult when he gets up initially in the monring. As the day goes on it gets better. He feels like the core activation helps as well as the stretches in reducing leg pain. He had a fall this past weekend when he missed a step.       Pain: 2/10     Objective  Pt reporting relief of hip pain upon standing after lateral hip glides today       Hip       2/13/2025 3/31/2025   Hip ROM/MMT   Rt Hip Flexion 125*tightness    Lt Hip Flexion 126    Rt Hip Flexion MMT (L2) 4 4+   Lt Hip Flexion MMT (L2) 4+ 4+   Rt Hip Extension 15 deg    Lt Hip Extension 15 deg    Rt Hip Extension MMT 4+    Lt Hip Extension MMT 4+    Rt Hip Abduction MMT 4+ 4+   Lt Hip Abduction MMT 4+ 5   Rt Hip ER WNL    Lt Hip ER WNL    Rt Hip ER MMT 4+    Lt Hip ER MMT 4+    Rt Hip IR WNL    Lt Hip IR WNL    Rt Hip IR MMT 4+    Lt Hip IR MMT 4+      Hamstring length: 50 deg on L, 45 deg on R       Assessment  Pt demonstrating improved hip strength since initial evaluation, also reporting reduced back and hip pain. Pt reporting hip pain after lateral band walks today, performed hip glides and then walking became pain free. Will continue to monitor. Educated on pain vs muscle fatigue and verbalized good understanding. Will continue to benefit from skilled PT in order to address strength and mobility limitations.    Goals (to be met in 12 visits)   Therapy Goals          Not Met Progress Toward Partially Met Met    Pt will improve transversus abdominis recruitment to perform proper isometric contraction without requiring verbal or tactile cuing to promote advancement of therex. []  []  []  []    Pt will demonstrate good understanding of proper posture and body mechanics to decrease pain and improve spinal safety. []  [x]  []  []    Pt will report improved symptom centralization and absence of radicular symptoms for 3 consecutive days to improve function with ADLs. []  [x]  []  []    Pt will have decreased paraspinal mm tension to tolerate standing >60 minutes for work and home activities. []  []  [x]  []    Pt will demonstrate improved core strength to be able to perform sit to stand with <2/10 pain. []  []  []  []    Pt will be independent and compliant with comprehensive HEP to maintain progress achieved in PT []  []  []  []                          Plan: Continue skilled Physical Therapy 1 x/week or a total of 4 visits over a 90 day period. Treatment will include: Gait training; Manual Therapy; Neuromuscular Re-education; Therapeutic Activities; Therapeutic Exercise; Home Exercise Program instruction        Patient/Family/Caregiver was advised of these findings, precautions, and treatment options and has agreed to actively participate in planning and for this course of care.    Thank you for your referral. If you have any questions, please contact me at Dept: [phone].    Sincerely,  Electronically signed by therapist: Chantal Charlton, PT        Plan  Continue with LE and core strengthening as tolerated.    Treatment Last 4 Visits  Treatment Day: 8       3/6/2025 3/11/2025 3/26/2025 3/31/2025   LE Treatment   Therapeutic Exercise NuStep lvl 4 x 5 min  LTR x 20 R/L   DKTC x 20 R/L   SB bridges 2 x 10 reps   Seated HS stretch 3 x 20 sec hold R/L  Standing hip abductions YTB 2 x 8 R/L   Standing hip ext  2 x 8 R/L  Nustep lvl 4 x 5 min   Sidelying clamshells RTB 2 x 10 R/L   SB bridges 2 x 10   SB press downs 3 sec hold 2  x 10   Standing hip abductions YTB 2 x 10   Standing hip extensions YTB 2 x 8   Sit to stands from elevated table height with abdominal activation focus x10  SB rollouts toward L, x 8  SB press downs x15  Hooklying hip adduction with ball 2 x 10 reps   Shuttle DLP 50# 2 x 12   Standing hip abductions YTB 2 x 10   Standing hip extensions YTB 2 x 8   SKTC 3 x 30 sec R NuStep lvl 5 x 5 min   Supine figure 4 stretch 30 sec x 3 R/L   Hooklying hip adduction with ball 2 x 10 reps  YTB lateral walks 2 laps in // bars *pain at end, relieved with rest   SB press downs 3 sec hold 2 x 10  Shuttle DLP 62# 2 x 12 reps    Manual Therapy Long axis distraction on R 10 sec hold x 10 (reports relief)  Roller to R hamstring/ITB/lateral quad. Trigger points found throughout.  Lateral hip glides on R gr III/IV 3 x 10 reps (performed after onset of pain when standing, pain free when standing after intervention) Long axis distraction 10 sec hold x 10   Manual HS stretch 3 x 30 sec R/L   Long axis distraction 10 sec holds x 10  Long axis distraction 10 sec holds x 10  Manual HS stretch 3 x 30 sec R/L  Lateral hip glides gr III 2 x 10 reps    Therapeutic Exercise Minutes 20 35 32 25   Manual Therapy Minutes 25 10 10 18   Total Time Of Timed Procedures 45 45 42 43   Total Time Of Service-Based Procedures 0 0 0 0   Total Treatment Time 45 45 42 43   HEP Access Code: [email or web address]  URL: [email or web address]  Date: 03/04/2025  Prepared by: Chantal Charlton     Exercises  - Supine Single Knee to Chest Stretch  - 1 x daily - 7 x weekly - 1 sets - 3 reps - 20 hold  - Supine Lower Trunk Rotation  - 1 x daily - 7 x weekly - 3 sets - 10 reps  - Clamshell with Resistance  - 1 x daily - 7 x weekly - 3 sets - 10 reps  - Seated 3 Way Exercise Ball Roll Out Stretch  - 1 x daily - 7 x weekly - 3 sets - 10 reps  - Seated Hamstring Stretch  - 1 x daily - 7 x weekly - 3 sets - 10 reps   Access Code: [email or web address]  URL:  [email or web address]  Date: 03/11/2025  Prepared by: Chantal Charlton    Exercises  - Supine Single Knee to Chest Stretch  - 1 x daily - 7 x weekly - 1 sets - 3 reps - 20 hold  - Supine Lower Trunk Rotation  - 1 x daily - 7 x weekly - 3 sets - 10 reps  - Clamshell with Resistance  - 1 x daily - 7 x weekly - 3 sets - 10 reps  - Seated 3 Way Exercise Ball Roll Out Stretch  - 1 x daily - 7 x weekly - 3 sets - 10 reps  - Seated Hamstring Stretch  - 1 x daily - 7 x weekly - 3 sets - 10 reps  - Bridge with Heels on Swiss Ball  - 1 x daily - 7 x weekly - 3 sets - 10 reps          HEP  Access Code: [email or web address]  URL: [email or web address]  Date: 03/11/2025  Prepared by: Chantal Sisler    Exercises  - Supine Single Knee to Chest Stretch  - 1 x daily - 7 x weekly - 1 sets - 3 reps - 20 hold  - Supine Lower Trunk Rotation  - 1 x daily - 7 x weekly - 3 sets - 10 reps  - Clamshell with Resistance  - 1 x daily - 7 x weekly - 3 sets - 10 reps  - Seated 3 Way Exercise Ball Roll Out Stretch  - 1 x daily - 7 x weekly - 3 sets - 10 reps  - Seated Hamstring Stretch  - 1 x daily - 7 x weekly - 3 sets - 10 reps  - Bridge with Heels on Swiss Ball  - 1 x daily - 7 x weekly - 3 sets - 10 reps    Charges     1 Man, 2 TE

## 2025-03-31 NOTE — TELEPHONE ENCOUNTER
Provided patient's spouse with all information/ comments as per Dr Mujica. She verbalizes understanding and recommended testing will be completed.

## 2025-04-01 ENCOUNTER — LAB ENCOUNTER (OUTPATIENT)
Dept: LAB | Age: 80
End: 2025-04-01
Attending: FAMILY MEDICINE
Payer: MEDICARE

## 2025-04-01 LAB
BASOPHILS # BLD AUTO: 0.05 X10(3) UL (ref 0–0.2)
BASOPHILS NFR BLD AUTO: 1 %
EOSINOPHIL # BLD AUTO: 0.69 X10(3) UL (ref 0–0.7)
EOSINOPHIL NFR BLD AUTO: 13.9 %
ERYTHROCYTE [DISTWIDTH] IN BLOOD BY AUTOMATED COUNT: 20.8 %
HCT VFR BLD AUTO: 35.1 %
HGB BLD-MCNC: 10.6 G/DL
IMM GRANULOCYTES # BLD AUTO: 0.03 X10(3) UL (ref 0–1)
IMM GRANULOCYTES NFR BLD: 0.6 %
LYMPHOCYTES # BLD AUTO: 0.91 X10(3) UL (ref 1–4)
LYMPHOCYTES NFR BLD AUTO: 18.3 %
MCH RBC QN AUTO: 27.4 PG (ref 26–34)
MCHC RBC AUTO-ENTMCNC: 30.2 G/DL (ref 31–37)
MCV RBC AUTO: 90.7 FL
MONOCYTES # BLD AUTO: 0.62 X10(3) UL (ref 0.1–1)
MONOCYTES NFR BLD AUTO: 12.5 %
NEUTROPHILS # BLD AUTO: 2.67 X10 (3) UL (ref 1.5–7.7)
NEUTROPHILS # BLD AUTO: 2.67 X10(3) UL (ref 1.5–7.7)
NEUTROPHILS NFR BLD AUTO: 53.7 %
PLATELET # BLD AUTO: 204 10(3)UL (ref 150–450)
RBC # BLD AUTO: 3.87 X10(6)UL
WBC # BLD AUTO: 5 X10(3) UL (ref 4–11)

## 2025-04-01 PROCEDURE — 36415 COLL VENOUS BLD VENIPUNCTURE: CPT | Performed by: FAMILY MEDICINE

## 2025-04-01 PROCEDURE — 85025 COMPLETE CBC W/AUTO DIFF WBC: CPT | Performed by: FAMILY MEDICINE

## 2025-04-02 ENCOUNTER — LAB ENCOUNTER (OUTPATIENT)
Dept: LAB | Age: 80
End: 2025-04-02
Attending: FAMILY MEDICINE
Payer: MEDICARE

## 2025-04-02 LAB — HEMOCCULT STL QL: NEGATIVE

## 2025-04-02 PROCEDURE — 82274 ASSAY TEST FOR BLOOD FECAL: CPT | Performed by: FAMILY MEDICINE

## 2025-04-07 ENCOUNTER — OFFICE VISIT (OUTPATIENT)
Dept: PHYSICAL THERAPY | Facility: HOSPITAL | Age: 80
End: 2025-04-07
Attending: FAMILY MEDICINE
Payer: MEDICARE

## 2025-04-07 PROCEDURE — 97140 MANUAL THERAPY 1/> REGIONS: CPT

## 2025-04-07 PROCEDURE — 97110 THERAPEUTIC EXERCISES: CPT

## 2025-04-07 NOTE — PROGRESS NOTES
Patient: Dev Johnston (79 year old, male) Referring Provider:  Insurance:   Diagnosis: Right sided sciatica (M54.31) Falling (R29.6) No ref. provider found  [employer] MCR   Date of Surgery: -- (laminectomy in January 2024) Next MD visit:  N/A   Precautions:  Fall Risk as needed Referral Information:    Date of Evaluation: Req: 0, Auth: 0, Exp:     02/13/25 POC Auth Visits:          Today's Date   4/7/2025    Subjective  The leg continues to feel a little bit better. Still the most painful when getting out of the car and getting out of bed in the morning.  Exercises are going well at home. Wednesday is getting a head CT per neurologist.       Pain: 0/10     Objective  Pt reporting relief with long axis distraction and lateral hip glides today.              Assessment  Pt reporting reduced hip pain compared to previous session. Weight bearing and static standing continue to be mildly aggravating, relieved almost immediately with sitting down. HEP updated to include hip adduction and modify clamshell to hooklying for patient comfort.    Goals (to be met in 12 visits)   Therapy Goals          Not Met Progress Toward Partially Met Met   Pt will improve transversus abdominis recruitment to perform proper isometric contraction without requiring verbal or tactile cuing to promote advancement of therex. []  []  []  []    Pt will demonstrate good understanding of proper posture and body mechanics to decrease pain and improve spinal safety. []  [x]  []  []    Pt will report improved symptom centralization and absence of radicular symptoms for 3 consecutive days to improve function with ADLs. []  [x]  []  []    Pt will have decreased paraspinal mm tension to tolerate standing >60 minutes for work and home activities. []  []  [x]  []    Pt will demonstrate improved core strength to be able to perform sit to stand with <2/10 pain. []  []  []  []    Pt will be independent and compliant with comprehensive HEP to maintain  progress achieved in PT []  []  []  []                             Plan  Continue with LE strengthening and manual therapy as needed.    Treatment Last 4 Visits  Treatment Day: 9       3/11/2025 3/26/2025 3/31/2025 4/7/2025   LE Treatment   Therapeutic Exercise Nustep lvl 4 x 5 min   Sidelying clamshells RTB 2 x 10 R/L   SB bridges 2 x 10   SB press downs 3 sec hold 2 x 10   Standing hip abductions YTB 2 x 10   Standing hip extensions YTB 2 x 8   Sit to stands from elevated table height with abdominal activation focus x10  SB rollouts toward L, x 8  SB press downs x15  Hooklying hip adduction with ball 2 x 10 reps   Shuttle DLP 50# 2 x 12   Standing hip abductions YTB 2 x 10   Standing hip extensions YTB 2 x 8   SKTC 3 x 30 sec R NuStep lvl 5 x 5 min   Supine figure 4 stretch 30 sec x 3 R/L   Hooklying hip adduction with ball 2 x 10 reps  YTB lateral walks 2 laps in // bars *pain at end, relieved with rest   SB press downs 3 sec hold 2 x 10  Shuttle DLP 62# 2 x 12 reps  NuStep lvl 5 x 5 min   Hooklying hip adduction 3 sec hold 2 x 12   Hooklying hip abduction BTB 2 x 12 (pt reporting pain with turning on side at home, modified to hooklying position)  Standing hip flexion YTB 2 x 8 R/L   Standing hip extension YTB 2 x 10 R/L    Manual Therapy Long axis distraction 10 sec hold x 10   Manual HS stretch 3 x 30 sec R/L   Long axis distraction 10 sec holds x 10  Long axis distraction 10 sec holds x 10  Manual HS stretch 3 x 30 sec R/L  Lateral hip glides gr III 2 x 10 reps  Long axis distraction 10 sec holds x 10   Manual HS stretch 3 x 30 sec R/L   Lateral hip glides gr III 3 x 10 reps      Therapeutic Exercise Minutes 35 32 25 30   Manual Therapy Minutes 10 10 18 15   Total Time Of Timed Procedures 45 42 43 45   Total Time Of Service-Based Procedures 0 0 0 0   Total Treatment Time 45 42 43 45   HEP Access Code: [email or web address]  URL: [email or web address]  Date: 03/11/2025  Prepared by: Chantal  Sisler    Exercises  - Supine Single Knee to Chest Stretch  - 1 x daily - 7 x weekly - 1 sets - 3 reps - 20 hold  - Supine Lower Trunk Rotation  - 1 x daily - 7 x weekly - 3 sets - 10 reps  - Clamshell with Resistance  - 1 x daily - 7 x weekly - 3 sets - 10 reps  - Seated 3 Way Exercise Ball Roll Out Stretch  - 1 x daily - 7 x weekly - 3 sets - 10 reps  - Seated Hamstring Stretch  - 1 x daily - 7 x weekly - 3 sets - 10 reps  - Bridge with Heels on Swiss Ball  - 1 x daily - 7 x weekly - 3 sets - 10 reps   Access Code: [email or web address]  URL: [email or web address]  Date: 04/07/2025  Prepared by: Chantal Charlton    Exercises  - Supine Single Knee to Chest Stretch  - 1 x daily - 7 x weekly - 1 sets - 3 reps - 20 hold  - Supine Lower Trunk Rotation  - 1 x daily - 7 x weekly - 3 sets - 10 reps  - Seated 3 Way Exercise Ball Roll Out Stretch  - 1 x daily - 7 x weekly - 3 sets - 10 reps  - Bridge with Heels on Swiss Ball  - 1 x daily - 7 x weekly - 3 sets - 10 reps  - Hooklying Clamshell with Resistance  - 1 x daily - 7 x weekly - 3 sets - 10 reps  - Seated Hamstring Stretch  - 1 x daily - 7 x weekly - 3 sets - 10 reps  - Supine Hip Adduction Isometric with Ball  - 1 x daily - 7 x weekly - 3 sets - 10 reps - 3 hold        HEP  Access Code: [email or web address]  URL: [email or web address]  Date: 04/07/2025  Prepared by: Chantal Charlton    Exercises  - Supine Single Knee to Chest Stretch  - 1 x daily - 7 x weekly - 1 sets - 3 reps - 20 hold  - Supine Lower Trunk Rotation  - 1 x daily - 7 x weekly - 3 sets - 10 reps  - Seated 3 Way Exercise Ball Roll Out Stretch  - 1 x daily - 7 x weekly - 3 sets - 10 reps  - Bridge with Heels on Swiss Ball  - 1 x daily - 7 x weekly - 3 sets - 10 reps  - Hooklying Clamshell with Resistance  - 1 x daily - 7 x weekly - 3 sets - 10 reps  - Seated Hamstring Stretch  - 1 x daily - 7 x weekly - 3 sets - 10 reps  - Supine Hip Adduction Isometric with Ball  - 1 x daily - 7 x weekly - 3  sets - 10 reps - 3 hold    Charges     1 Man, 2 TE

## 2025-04-09 ENCOUNTER — HOSPITAL ENCOUNTER (OUTPATIENT)
Dept: MRI IMAGING | Age: 80
Discharge: HOME | End: 2025-04-09
Attending: Other
Payer: MEDICARE

## 2025-04-09 DIAGNOSIS — G25.3 MYOCLONIC JERKING WHILE SLEEPING: ICD-10-CM

## 2025-04-09 DIAGNOSIS — R29.2: ICD-10-CM

## 2025-04-09 DIAGNOSIS — R29.2 HYPER REFLEXIA: ICD-10-CM

## 2025-04-09 PROCEDURE — 70551 MRI BRAIN STEM W/O DYE: CPT | Performed by: OTHER

## 2025-04-09 PROCEDURE — 72141 MRI NECK SPINE W/O DYE: CPT | Performed by: OTHER

## 2025-04-10 ENCOUNTER — TELEPHONE (OUTPATIENT)
Dept: NEUROLOGY | Facility: CLINIC | Age: 80
End: 2025-04-10

## 2025-04-10 DIAGNOSIS — G25.3 MYOCLONIC JERKING WHILE SLEEPING: ICD-10-CM

## 2025-04-10 NOTE — TELEPHONE ENCOUNTER
Patient life partner calling pt had his MRI' done yesterday and wants to know if Patient needs to continued taking the gabapentin he is almost done

## 2025-04-10 NOTE — TELEPHONE ENCOUNTER
Medication: Gabapentin 100mg     Date of last refill: 3/12/25 (#60/0)  [place]      Last office visit: 3/12/25  Due back to clinic per last office note:  3m  Date next office visit scheduled:    Future Appointments   Date Time Provider Department Center   4/14/2025 11:00 AM Chantal Charlton, PT  PHYS  [facility]   4/24/2025  1:00 PM Chantal Charlton, PT  PHYS  [facility]   4/28/2025 11:00 AM Chantal Charlton PT  PHYS  [facility]   6/11/2025  9:40 AM Geoff Cespedes MD [facility]           Last OV note recommendation:    Dev Johnston is a 79 year old male who presents for evaluation of abnormal jerking at night.     He states he has some R leg pain since 12/2024 but denies injury and feels this is \"sciatic pain\" - denies tingling radiating down to feet. He has been having jerking movements for the past 2 yrs and also had spinal surgery ~ 1 yr ago after having persistent localized low back pain. He denies tripping over feet but has some balance issues for the past few years and had fall 12/2024.  He denies numbness/ tingling in hands or feet or tripping over feet or dropping objects from hands.    He has a feeling of electrical sensation in his knees / legs on both sides when he sits in his chair at night or when he is going to sleep. He has tried taking gabapentin prior to sleep and had been increased up to 300 mg nightly and was also tried on pramipexole but neither of these worked and pramipexole caused cramps. He does note symptoms mainly occur at night.       Neurologic exam shows brisk DTRs with +turner's sign, very unsteady gait and reduced vibratory sensation - this could be due to low B12 or upper motor neuron pathology in brain or cervical spine and recommend MRI brain and cervical spine to evaluate.    In addition, symptoms overall suggestive of a combination of myoclonic jerks and restless legs syndrome - recommend try gabapentin in  divided dose at 100 mg ~5 PM and 100 mg ~9 PM prior to bedtime. In addition, given he did not respond well to gabapentin or pramipexole in the past, he could have concurrent iron deficiency which could also contribute to restless legs symptoms - will check iron studies.      1. Hyper reflexia  As noted above   - MRI BRAIN (CPT=70551); Future  - MRI SPINE CERVICAL (CPT=72141) [[id number]]; Future  - Vitamin B12     2. Cooper's reflex positive  As noted above   - MRI BRAIN (CPT=70551); Future  - MRI SPINE CERVICAL (CPT=72141) [[id number]]; Future     3. Myoclonic jerking while sleeping  As noted above   - MRI BRAIN (CPT=70551); Future  - MRI SPINE CERVICAL (CPT=72141) [[id number]]; Future  - gabapentin 100 MG Oral Cap; Take 100 mg ( 1 capsule) ~ 5 PM and 100 mg (1 capsule) ~ 9 PM prior to bedtime  Dispense: 60 capsule; Refill: 0  - Vitamin B12  - Iron And Tibc  - Ferritin     4. Decreased vibratory sense  As noted above   - Vitamin B12     5. Unsteady gait  As noted above   - Vitamin B12     6. Restless legs syndrome  As noted above   - Iron And Tibc  - Ferritin     Return in about 3 months (around 6/12/2025).

## 2025-04-11 RX ORDER — GABAPENTIN 100 MG/1
CAPSULE ORAL
Qty: 60 CAPSULE | Refills: 0 | Status: SHIPPED | OUTPATIENT
Start: 2025-04-11

## 2025-04-14 ENCOUNTER — TELEPHONE (OUTPATIENT)
Dept: FAMILY MEDICINE CLINIC | Facility: CLINIC | Age: 80
End: 2025-04-14

## 2025-04-14 ENCOUNTER — OFFICE VISIT (OUTPATIENT)
Dept: PHYSICAL THERAPY | Facility: HOSPITAL | Age: 80
End: 2025-04-14
Attending: FAMILY MEDICINE
Payer: MEDICARE

## 2025-04-14 DIAGNOSIS — R06.89 DIFFICULTY BREATHING: Primary | ICD-10-CM

## 2025-04-14 PROCEDURE — 97140 MANUAL THERAPY 1/> REGIONS: CPT

## 2025-04-14 PROCEDURE — 97110 THERAPEUTIC EXERCISES: CPT

## 2025-04-14 NOTE — TELEPHONE ENCOUNTER
Pt partner calling and wants to know what the next steps are   Pt completed his MRI's   He is still having trouble breathing , pt is still dizzy and having trouble with his legs   Please advise

## 2025-04-14 NOTE — TELEPHONE ENCOUNTER
Spoke to Adrienne.  Wants to notify Dr. Mujica that they do see Dr. Johnson for his breathing.  Getting dizzy from his breathing issue. Also states his chest xray sometimes isn't accurate because he has a \"detached lung?\".     Will get lab and xray done. Await results. No available openings till Mid-may. Okay too use a SDA slot sooner?

## 2025-04-14 NOTE — TELEPHONE ENCOUNTER
MRI has some abnormal findings but nothing that I think explains symptoms but I defer to Dr. Cespedes on that.  I thought the breathing issue was due to anemia but that's been improving, should check again, but also xray, and then appointment soon    Tim Mujica MD

## 2025-04-14 NOTE — PROGRESS NOTES
Patient: Dev Johnston (79 year old, male) Referring Provider:  Insurance:   Diagnosis: Right sided sciatica (M54.31) Falling (R29.6) No ref. provider found  [employer] MCR   Date of Surgery: -- (laminectomy in January 2024) Next MD visit:  N/A   Precautions:  Fall Risk as needed Referral Information:    Date of Evaluation: Req: 0, Auth: 0, Exp:     02/13/25 POC Auth Visits:          Today's Date   4/14/2025    Subjective  Leg pain comes and goes. Exercises are going well at home, able to complete pain free. Was doing some yard work for a couple hours last week and had some soreness by the end.       Pain: 0/10     Objective  Mini squats: pain free         Assessment  Pt tolerating manual therapy well, reporting reduced symptoms. Tolerates more standing exercises today with muscle soreness but no pain. Pt reporting relief from trying figure 4 stretch at home, HEP updated to include figure 4 stretch.    Goals (to be met in 12 visits)     Therapy Goals          Not Met Progress Toward Partially Met Met   Pt will improve transversus abdominis recruitment to perform proper isometric contraction without requiring verbal or tactile cuing to promote advancement of therex. []  [x]  []  []    Pt will demonstrate good understanding of proper posture and body mechanics to decrease pain and improve spinal safety. []  [x]  []  []    Pt will report improved symptom centralization and absence of radicular symptoms for 3 consecutive days to improve function with ADLs. []  [x]  []  []    Pt will have decreased paraspinal mm tension to tolerate standing >60 minutes for work and home activities. []  []  [x]  []    Pt will demonstrate improved core strength to be able to perform sit to stand with <2/10 pain. []  [x]  []  []    Pt will be independent and compliant with comprehensive HEP to maintain progress achieved in PT []  []  [x]  []                                 Plan  Continue with LE strengthening and manual therapy as  needed. Plan for next therapy session: reassess mini squats.    Treatment Last 4 Visits  Treatment Day: 10       3/26/2025 3/31/2025 4/7/2025 4/14/2025   LE Treatment   Therapeutic Exercise SB press downs x15  Hooklying hip adduction with ball 2 x 10 reps   Shuttle DLP 50# 2 x 12   Standing hip abductions YTB 2 x 10   Standing hip extensions YTB 2 x 8   SKTC 3 x 30 sec R NuStep lvl 5 x 5 min   Supine figure 4 stretch 30 sec x 3 R/L   Hooklying hip adduction with ball 2 x 10 reps  YTB lateral walks 2 laps in // bars *pain at end, relieved with rest   SB press downs 3 sec hold 2 x 10  Shuttle DLP 62# 2 x 12 reps  NuStep lvl 5 x 5 min   Hooklying hip adduction 3 sec hold 2 x 12   Hooklying hip abduction BTB 2 x 12 (pt reporting pain with turning on side at home, modified to hooklying position)  Standing hip flexion YTB 2 x 8 R/L   Standing hip extension YTB 2 x 10 R/L  Nustep lvl 4   DKTC x 15   Standing mini squat 2 x 8   Forward step ups 4in  x 8 R/L  Shuttle DLP 68# 2 x 12   Standing hip ext RTB 2 x 8 reps   Seated hamstring stretch on R 30 sec x 3   Supine figure 4 stretch on R 30 sec x 3    Manual Therapy Long axis distraction 10 sec holds x 10  Long axis distraction 10 sec holds x 10  Manual HS stretch 3 x 30 sec R/L  Lateral hip glides gr III 2 x 10 reps  Long axis distraction 10 sec holds x 10   Manual HS stretch 3 x 30 sec R/L   Lateral hip glides gr III 3 x 10 reps    R hip lateral glides 2 x 10 gr III   R hip inferior glides 2 x 10 gr III   R hip posterior glides 2 x 10 gr III      Therapeutic Exercise Minutes 32 25 30 25   Manual Therapy Minutes 10 18 15 18   Total Time Of Timed Procedures 42 43 45 43   Total Time Of Service-Based Procedures 0 0 0 0   Total Treatment Time 42 43 45 43   HEP   Access Code: [email or web address]  URL: [email or web address]  Date: 04/07/2025  Prepared by: Chantal Charlton    Exercises  - Supine Single Knee to Chest Stretch  - 1 x daily - 7 x weekly - 1 sets - 3 reps - 20  hold  - Supine Lower Trunk Rotation  - 1 x daily - 7 x weekly - 3 sets - 10 reps  - Seated 3 Way Exercise Ball Roll Out Stretch  - 1 x daily - 7 x weekly - 3 sets - 10 reps  - Bridge with Heels on Swiss Ball  - 1 x daily - 7 x weekly - 3 sets - 10 reps  - Hooklying Clamshell with Resistance  - 1 x daily - 7 x weekly - 3 sets - 10 reps  - Seated Hamstring Stretch  - 1 x daily - 7 x weekly - 3 sets - 10 reps  - Supine Hip Adduction Isometric with Ball  - 1 x daily - 7 x weekly - 3 sets - 10 reps - 3 hold Access Code: [email or web address]  URL: [email or web address]  Date: 04/14/2025  Prepared by: Chantal Charlton    Exercises  - Supine Single Knee to Chest Stretch  - 1 x daily - 7 x weekly - 1 sets - 3 reps - 20 hold  - Supine Lower Trunk Rotation  - 1 x daily - 7 x weekly - 3 sets - 10 reps  - Seated 3 Way Exercise Ball Roll Out Stretch  - 1 x daily - 7 x weekly - 3 sets - 10 reps  - Bridge with Heels on Swiss Ball  - 1 x daily - 7 x weekly - 3 sets - 10 reps  - Hooklying Clamshell with Resistance  - 1 x daily - 7 x weekly - 3 sets - 10 reps  - Seated Hamstring Stretch  - 1 x daily - 7 x weekly - 3 sets - 10 reps  - Supine Hip Adduction Isometric with Ball  - 1 x daily - 7 x weekly - 3 sets - 10 reps - 3 hold  - Supine Figure 4 Piriformis Stretch  - 1 x daily - 7 x weekly - 1 sets - 3 reps - 30 hold        HEP  Access Code: [email or web address]  URL: [email or web address]  Date: 04/14/2025  Prepared by: Chantal Charlton    Exercises  - Supine Single Knee to Chest Stretch  - 1 x daily - 7 x weekly - 1 sets - 3 reps - 20 hold  - Supine Lower Trunk Rotation  - 1 x daily - 7 x weekly - 3 sets - 10 reps  - Seated 3 Way Exercise Ball Roll Out Stretch  - 1 x daily - 7 x weekly - 3 sets - 10 reps  - Bridge with Heels on Swiss Ball  - 1 x daily - 7 x weekly - 3 sets - 10 reps  - Hooklying Clamshell with Resistance  - 1 x daily - 7 x weekly - 3 sets - 10 reps  - Seated Hamstring Stretch  - 1 x daily - 7 x weekly - 3  sets - 10 reps  - Supine Hip Adduction Isometric with Ball  - 1 x daily - 7 x weekly - 3 sets - 10 reps - 3 hold  - Supine Figure 4 Piriformis Stretch  - 1 x daily - 7 x weekly - 1 sets - 3 reps - 30 hold    Charges     1 Man, 2 TE

## 2025-04-14 NOTE — TELEPHONE ENCOUNTER
Yes, it's that lung that makes me want to check, to make sure no fluid build up or something else going on.    Tim Mujica MD

## 2025-04-15 ENCOUNTER — LAB ENCOUNTER (OUTPATIENT)
Dept: LAB | Age: 80
End: 2025-04-15
Attending: FAMILY MEDICINE
Payer: MEDICARE

## 2025-04-15 ENCOUNTER — HOSPITAL ENCOUNTER (OUTPATIENT)
Dept: GENERAL RADIOLOGY | Age: 80
Discharge: HOME | End: 2025-04-15
Attending: FAMILY MEDICINE
Payer: MEDICARE

## 2025-04-15 DIAGNOSIS — R06.89 DIFFICULTY BREATHING: ICD-10-CM

## 2025-04-15 LAB
BASOPHILS # BLD AUTO: 0.04 X10(3) UL (ref 0–0.2)
BASOPHILS NFR BLD AUTO: 0.6 %
EOSINOPHIL # BLD AUTO: 0.69 X10(3) UL (ref 0–0.7)
EOSINOPHIL NFR BLD AUTO: 11 %
ERYTHROCYTE [DISTWIDTH] IN BLOOD BY AUTOMATED COUNT: 22.9 %
HCT VFR BLD AUTO: 36.3 % (ref 39–53)
HGB BLD-MCNC: 11.3 G/DL (ref 13–17.5)
IMM GRANULOCYTES # BLD AUTO: 0.03 X10(3) UL (ref 0–1)
IMM GRANULOCYTES NFR BLD: 0.5 %
LYMPHOCYTES # BLD AUTO: 0.71 X10(3) UL (ref 1–4)
LYMPHOCYTES NFR BLD AUTO: 11.3 %
MCH RBC QN AUTO: 28.3 PG (ref 26–34)
MCHC RBC AUTO-ENTMCNC: 31.1 G/DL (ref 31–37)
MCV RBC AUTO: 90.8 FL (ref 80–100)
MONOCYTES # BLD AUTO: 0.62 X10(3) UL (ref 0.1–1)
MONOCYTES NFR BLD AUTO: 9.9 %
NEUTROPHILS # BLD AUTO: 4.19 X10 (3) UL (ref 1.5–7.7)
NEUTROPHILS # BLD AUTO: 4.19 X10(3) UL (ref 1.5–7.7)
NEUTROPHILS NFR BLD AUTO: 66.7 %
PLATELET # BLD AUTO: 204 10(3)UL (ref 150–450)
PLATELET MORPHOLOGY: NORMAL
RBC # BLD AUTO: 4 X10(6)UL (ref 3.8–5.8)
WBC # BLD AUTO: 6.3 X10(3) UL (ref 4–11)

## 2025-04-15 PROCEDURE — 85025 COMPLETE CBC W/AUTO DIFF WBC: CPT | Performed by: FAMILY MEDICINE

## 2025-04-15 PROCEDURE — 36415 COLL VENOUS BLD VENIPUNCTURE: CPT | Performed by: FAMILY MEDICINE

## 2025-04-15 PROCEDURE — 71046 X-RAY EXAM CHEST 2 VIEWS: CPT | Performed by: FAMILY MEDICINE

## 2025-04-21 ENCOUNTER — TELEPHONE (OUTPATIENT)
Facility: LOCATION | Age: 80
End: 2025-04-21

## 2025-04-21 NOTE — TELEPHONE ENCOUNTER
Pt Partner Adrienne is calling to schedule a new consult appt.  Patient Name- Skip Johnston  5/30/1945  Referred to -Dr. Jacobo    if possible  Referred by- Self  ( pt records in system)  Reason- Anemia   Insurance- [employer] advantage   Please give pt  partner Adrienne call back.  [phone]  Thank you.

## 2025-04-24 ENCOUNTER — OFFICE VISIT (OUTPATIENT)
Dept: PHYSICAL THERAPY | Facility: HOSPITAL | Age: 80
End: 2025-04-24
Attending: FAMILY MEDICINE
Payer: MEDICARE

## 2025-04-24 PROCEDURE — 97140 MANUAL THERAPY 1/> REGIONS: CPT

## 2025-04-24 PROCEDURE — 97110 THERAPEUTIC EXERCISES: CPT

## 2025-04-24 NOTE — PROGRESS NOTES
Patient: Dev Johnston (79 year old, male) Referring Provider:  Insurance:   Diagnosis: Right sided sciatica (M54.31) Falling (R29.6) No ref. provider found  PRIMO BROOKS   Date of Surgery: -- (laminectomy in January 2024) Next MD visit:  N/A   Precautions:  Fall Risk as needed Referral Information:    Date of Evaluation: Req: 0, Auth: 0, Exp:     02/13/25 POC Auth Visits:          Today's Date   4/24/2025    Subjective  On Tuesday the leg pain started feeling worse. Fell this morning from squat position because of the pain, feels like he cannot put weight through the R leg. Points to buttock, lateral hip, and anterior thigh. Reports that figure 4 stretch seems to help.       Pain: 7/10 (2/10 post therapy session)     Objective  Sit to stands: pain free            Assessment  Pt reporting increased symptoms the past few days. After manual intervention able to walk 2 laps around clinic pain free. Pain returned upon standing to leave clinic, pt advised to continue with stretches and strengthening at home.    Goals (to be met in 12 visits)     Therapy Goals          Not Met Progress Toward Partially Met Met   Pt will improve transversus abdominis recruitment to perform proper isometric contraction without requiring verbal or tactile cuing to promote advancement of therex. []  [x]  []  []    Pt will demonstrate good understanding of proper posture and body mechanics to decrease pain and improve spinal safety. []  [x]  []  []    Pt will report improved symptom centralization and absence of radicular symptoms for 3 consecutive days to improve function with ADLs. []  [x]  []  []    Pt will have decreased paraspinal mm tension to tolerate standing >60 minutes for work and home activities. []  []  [x]  []    Pt will demonstrate improved core strength to be able to perform sit to stand with <2/10 pain. []  []  [x]  []    Pt will be independent and compliant with comprehensive HEP to maintain progress achieved in PT []   []  [x]  []                                     Plan  Continue with LE strengthening and manual therapy as needed.    Treatment Last 4 Visits  Treatment Day: 11       3/31/2025 4/7/2025 4/14/2025 4/24/2025   LE Treatment   Therapeutic Exercise NuStep lvl 5 x 5 min   Supine figure 4 stretch 30 sec x 3 R/L   Hooklying hip adduction with ball 2 x 10 reps  YTB lateral walks 2 laps in // bars *pain at end, relieved with rest   SB press downs 3 sec hold 2 x 10  Shuttle DLP 62# 2 x 12 reps  NuStep lvl 5 x 5 min   Hooklying hip adduction 3 sec hold 2 x 12   Hooklying hip abduction BTB 2 x 12 (pt reporting pain with turning on side at home, modified to hooklying position)  Standing hip flexion YTB 2 x 8 R/L   Standing hip extension YTB 2 x 10 R/L  Nustep lvl 4   DKTC x 15   Standing mini squat 2 x 8   Forward step ups 4in  x 8 R/L  Shuttle DLP 68# 2 x 12   Standing hip ext RTB 2 x 8 reps   Seated hamstring stretch on R 30 sec x 3   Supine figure 4 stretch on R 30 sec x 3  Supine figure 4 stretch on R 30 sec x 3   DKTC x 15   STS 2 x 8   GTB hamstring curl 2 x 10 R/L   LAQ 3# x 15 R/L   Prone quad stretch 20 sec x 3 R/L    Manual Therapy Long axis distraction 10 sec holds x 10  Manual HS stretch 3 x 30 sec R/L  Lateral hip glides gr III 2 x 10 reps  Long axis distraction 10 sec holds x 10   Manual HS stretch 3 x 30 sec R/L   Lateral hip glides gr III 3 x 10 reps    R hip lateral glides 2 x 10 gr III   R hip inferior glides 2 x 10 gr III   R hip posterior glides 2 x 10 gr III    R hip lateral glides 2 x 10 gr III, MWM into flexion, multiple bouts  R hip inferior glides 2 x 10 gr III   R hip posterior glides 2 x 10 gr III      Therapeutic Exercise Minutes 25 30 25 25   Manual Therapy Minutes 18 15 18 20   Total Time Of Timed Procedures 43 45 43 45   Total Time Of Service-Based Procedures 0 0 0 0   Total Treatment Time 43 45 43 45   HEP  Access Code: [email or web address]  URL: [email or web address]  Date:  04/07/2025  Prepared by: Chantal Avalosler    Exercises  - Supine Single Knee to Chest Stretch  - 1 x daily - 7 x weekly - 1 sets - 3 reps - 20 hold  - Supine Lower Trunk Rotation  - 1 x daily - 7 x weekly - 3 sets - 10 reps  - Seated 3 Way Exercise Ball Roll Out Stretch  - 1 x daily - 7 x weekly - 3 sets - 10 reps  - Bridge with Heels on Swiss Ball  - 1 x daily - 7 x weekly - 3 sets - 10 reps  - Hooklying Clamshell with Resistance  - 1 x daily - 7 x weekly - 3 sets - 10 reps  - Seated Hamstring Stretch  - 1 x daily - 7 x weekly - 3 sets - 10 reps  - Supine Hip Adduction Isometric with Ball  - 1 x daily - 7 x weekly - 3 sets - 10 reps - 3 hold Access Code: [email or web address]  URL: [email or web address]  Date: 04/14/2025  Prepared by: Chantal Avalosler    Exercises  - Supine Single Knee to Chest Stretch  - 1 x daily - 7 x weekly - 1 sets - 3 reps - 20 hold  - Supine Lower Trunk Rotation  - 1 x daily - 7 x weekly - 3 sets - 10 reps  - Seated 3 Way Exercise Ball Roll Out Stretch  - 1 x daily - 7 x weekly - 3 sets - 10 reps  - Bridge with Heels on Swiss Ball  - 1 x daily - 7 x weekly - 3 sets - 10 reps  - Hooklying Clamshell with Resistance  - 1 x daily - 7 x weekly - 3 sets - 10 reps  - Seated Hamstring Stretch  - 1 x daily - 7 x weekly - 3 sets - 10 reps  - Supine Hip Adduction Isometric with Ball  - 1 x daily - 7 x weekly - 3 sets - 10 reps - 3 hold  - Supine Figure 4 Piriformis Stretch  - 1 x daily - 7 x weekly - 1 sets - 3 reps - 30 hold         HEP  Access Code: [email or web address]  URL: [email or web address]  Date: 04/14/2025  Prepared by: Chantal Sisler    Exercises  - Supine Single Knee to Chest Stretch  - 1 x daily - 7 x weekly - 1 sets - 3 reps - 20 hold  - Supine Lower Trunk Rotation  - 1 x daily - 7 x weekly - 3 sets - 10 reps  - Seated 3 Way Exercise Ball Roll Out Stretch  - 1 x daily - 7 x weekly - 3 sets - 10 reps  - Bridge with Heels on Swiss Ball  - 1 x daily - 7 x weekly - 3 sets - 10  reps  - Hooklying Clamshell with Resistance  - 1 x daily - 7 x weekly - 3 sets - 10 reps  - Seated Hamstring Stretch  - 1 x daily - 7 x weekly - 3 sets - 10 reps  - Supine Hip Adduction Isometric with Ball  - 1 x daily - 7 x weekly - 3 sets - 10 reps - 3 hold  - Supine Figure 4 Piriformis Stretch  - 1 x daily - 7 x weekly - 1 sets - 3 reps - 30 hold    Charges     1 Man, 2 TE

## 2025-04-28 ENCOUNTER — OFFICE VISIT (OUTPATIENT)
Dept: PHYSICAL THERAPY | Facility: HOSPITAL | Age: 80
End: 2025-04-28
Attending: FAMILY MEDICINE
Payer: MEDICARE

## 2025-04-28 PROCEDURE — 97140 MANUAL THERAPY 1/> REGIONS: CPT

## 2025-04-28 PROCEDURE — 97110 THERAPEUTIC EXERCISES: CPT

## 2025-04-28 NOTE — PROGRESS NOTES
Patient: Dev Johnston (79 year old, male) Referring Provider:  Insurance:   Diagnosis: Right sided sciatica (M54.31) Falling (R29.6) No ref. provider found  [employer] MCR   Date of Surgery: -- (laminectomy in January 2024) Next MD visit:  N/A   Precautions:  Fall Risk as needed Referral Information:    Date of Evaluation: Req: 0, Auth: 0, Exp:     02/13/25 POC Auth Visits:          Today's Date   4/28/2025     Discharge Summary  Pt has attended 12 visits in Physical Therapy.     Subjective  The R leg is better today. Sunday was a good day and then today he is a little more sore. Would like today to be the last visit and trial independent HEP for a while.       Pain: 2/10     Objective          Trunk       2/13/2025 4/28/2025   Trunk ROM/MMT   Trunk Flex 35 cm from ground 30   Trunk Rt Side Bend 60 cm from ground * leg pain 60 cm from ground*   Trunk Lt Side Bend 60 cm from ground reports relief 60 cm from ground   , Hip       2/13/2025 3/31/2025 4/28/2025   Hip ROM/MMT   Rt Hip Flexion 125*tightness     Lt Hip Flexion 126     Rt Hip Flexion MMT (L2) 4 4+ 5   Lt Hip Flexion MMT (L2) 4+ 4+ 5   Rt Hip Extension 15 deg     Lt Hip Extension 15 deg     Rt Hip Extension MMT 4+     Lt Hip Extension MMT 4+     Rt Hip Abduction MMT 4+ 4+ 5   Lt Hip Abduction MMT 4+ 5 5   Rt Hip ER WNL     Lt Hip ER WNL     Rt Hip ER MMT 4+  4+   Lt Hip ER MMT 4+  5   Rt Hip IR WNL     Lt Hip IR WNL     Rt Hip IR MMT 4+  4+   Lt Hip IR MMT 4+  5   , Myotome       2/13/2025 3/31/2025 4/28/2025   Myotome   Rt Hip Flexion MMT (L2) 4 4+ 5   Lt Hip Flexion MMT (L2) 4+ 4+ 5   Rt Knee Extension MMT (L3) 4+  5   Lt Knee Extension MMT (L3) 4+  5          Assessment  Pt overall reporting improved symptoms since starting physical therapy. Demonstrates improved strength and tolerance to activities around his home. Continues to report some hip pain with walking but would like to trial independent HEP. Exercises reviewed and pt demonstrated good  understanding.    Goals (to be met in 12 visits)     Therapy Goals          Not Met Progress Toward Partially Met Met   Pt will improve transversus abdominis recruitment to perform proper isometric contraction without requiring verbal or tactile cuing to promote advancement of therex. []  []  []  [x]    Pt will demonstrate good understanding of proper posture and body mechanics to decrease pain and improve spinal safety. []  []  []  [x]    Pt will report improved symptom centralization and absence of radicular symptoms for 3 consecutive days to improve function with ADLs. []  []  [x]  []    Pt will have decreased paraspinal mm tension to tolerate standing >60 minutes for work and home activities. []  []  []  [x]    Pt will demonstrate improved core strength to be able to perform sit to stand with <2/10 pain. []  []  []  [x]    Pt will be independent and compliant with comprehensive HEP to maintain progress achieved in PT []  []  []  [x]                              LEFS Score  LEFS Score: (Patient-Rptd) 61.25 % (2/11/2025 12:31 PM)    Post LEFS Score  Post LEFS Score: (Patient-Rptd) 72.5 % (4/28/2025 11:41 AM)    11.25 % improvement         Plan  Discharge to independent HEP. If patient does not call office within 6 weeks, formally discharge from physical therapy.    Patient/Family/Caregiver was advised of these findings, precautions, and treatment options and has agreed to actively participate in planning and for this course of care.    Thank you for your referral. If you have any questions, please contact me at Dept: [phone].    Sincerely,  Electronically signed by therapist: Chantal Charlton, PT     Physician's certification required:  Yes  Please co-sign or sign and return this letter via fax as soon as possible to [phone].   I certify the need for these services furnished under this plan of treatment and while under my care.    X___________________________________________________  Date____________________    Certification From: 4/28/2025  To:7/27/2025             Treatment Last 4 Visits  Treatment Day: 12       4/7/2025 4/14/2025 4/24/2025 4/28/2025   LE Treatment   Therapeutic Exercise NuStep lvl 5 x 5 min   Hooklying hip adduction 3 sec hold 2 x 12   Hooklying hip abduction BTB 2 x 12 (pt reporting pain with turning on side at home, modified to hooklying position)  Standing hip flexion YTB 2 x 8 R/L   Standing hip extension YTB 2 x 10 R/L  Nustep lvl 4   DKTC x 15   Standing mini squat 2 x 8   Forward step ups 4in  x 8 R/L  Shuttle DLP 68# 2 x 12   Standing hip ext RTB 2 x 8 reps   Seated hamstring stretch on R 30 sec x 3   Supine figure 4 stretch on R 30 sec x 3  Supine figure 4 stretch on R 30 sec x 3   DKTC x 15   STS 2 x 8   GTB hamstring curl 2 x 10 R/L   LAQ 3# x 15 R/L   Prone quad stretch 20 sec x 3 R/L  Reassessment as above   Supine figure 4 stretch 30 sec x 3 on R   LTR x 15   SB bridges 2 x 10   BTB hooklying hip abduction 2 x 10   Hooklying hip adduction with ball 3 sec hold 2 x 10        Manual Therapy Long axis distraction 10 sec holds x 10   Manual HS stretch 3 x 30 sec R/L   Lateral hip glides gr III 3 x 10 reps    R hip lateral glides 2 x 10 gr III   R hip inferior glides 2 x 10 gr III   R hip posterior glides 2 x 10 gr III    R hip lateral glides 2 x 10 gr III, MWM into flexion, multiple bouts  R hip inferior glides 2 x 10 gr III   R hip posterior glides 2 x 10 gr III    STM to R piriformis x 5 min   Lateral hip glides with MWM into flexion, gr III, multiple bouts   Inferior hip glides on R gr III, multiple bouts    Therapeutic Exercise Minutes 30 25 25 25   Manual Therapy Minutes 15 18 20 20   Total Time Of Timed Procedures 45 43 45 45   Total Time Of Service-Based Procedures 0 0 0 0   Total Treatment Time 45 43 45 45   HEP Access Code: [email or web address]  URL: [email or web address]  Date: 04/07/2025  Prepared by: Chantal Charlton    Exercises  - Supine Single  Knee to Chest Stretch  - 1 x daily - 7 x weekly - 1 sets - 3 reps - 20 hold  - Supine Lower Trunk Rotation  - 1 x daily - 7 x weekly - 3 sets - 10 reps  - Seated 3 Way Exercise Ball Roll Out Stretch  - 1 x daily - 7 x weekly - 3 sets - 10 reps  - Bridge with Heels on Swiss Ball  - 1 x daily - 7 x weekly - 3 sets - 10 reps  - Hooklying Clamshell with Resistance  - 1 x daily - 7 x weekly - 3 sets - 10 reps  - Seated Hamstring Stretch  - 1 x daily - 7 x weekly - 3 sets - 10 reps  - Supine Hip Adduction Isometric with Ball  - 1 x daily - 7 x weekly - 3 sets - 10 reps - 3 hold Access Code: [email or web address]  URL: [email or web address]  Date: 04/14/2025  Prepared by: Chantal Charlton    Exercises  - Supine Single Knee to Chest Stretch  - 1 x daily - 7 x weekly - 1 sets - 3 reps - 20 hold  - Supine Lower Trunk Rotation  - 1 x daily - 7 x weekly - 3 sets - 10 reps  - Seated 3 Way Exercise Ball Roll Out Stretch  - 1 x daily - 7 x weekly - 3 sets - 10 reps  - Bridge with Heels on Swiss Ball  - 1 x daily - 7 x weekly - 3 sets - 10 reps  - Hooklying Clamshell with Resistance  - 1 x daily - 7 x weekly - 3 sets - 10 reps  - Seated Hamstring Stretch  - 1 x daily - 7 x weekly - 3 sets - 10 reps  - Supine Hip Adduction Isometric with Ball  - 1 x daily - 7 x weekly - 3 sets - 10 reps - 3 hold  - Supine Figure 4 Piriformis Stretch  - 1 x daily - 7 x weekly - 1 sets - 3 reps - 30 hold          HEP  Access Code: [email or web address]  URL: [email or web address]  Date: 04/14/2025  Prepared by: Chantal Charlton    Exercises  - Supine Single Knee to Chest Stretch  - 1 x daily - 7 x weekly - 1 sets - 3 reps - 20 hold  - Supine Lower Trunk Rotation  - 1 x daily - 7 x weekly - 3 sets - 10 reps  - Seated 3 Way Exercise Ball Roll Out Stretch  - 1 x daily - 7 x weekly - 3 sets - 10 reps  - Bridge with Heels on Swiss Ball  - 1 x daily - 7 x weekly - 3 sets - 10 reps  - Hooklying Clamshell with Resistance  - 1 x daily - 7 x weekly - 3  sets - 10 reps  - Seated Hamstring Stretch  - 1 x daily - 7 x weekly - 3 sets - 10 reps  - Supine Hip Adduction Isometric with Ball  - 1 x daily - 7 x weekly - 3 sets - 10 reps - 3 hold  - Supine Figure 4 Piriformis Stretch  - 1 x daily - 7 x weekly - 1 sets - 3 reps - 30 hold    Charges     1 Man, 2 TE

## 2025-04-29 ENCOUNTER — TELEPHONE (OUTPATIENT)
Age: 80
End: 2025-04-29

## 2025-04-29 NOTE — TELEPHONE ENCOUNTER
Spouse called she agrees to consult apt  with Dr Jacobo for May 14 at 100 pm in [place], please schedule.

## 2025-04-29 NOTE — TELEPHONE ENCOUNTER
PT is having severe leg cramping and muscle pains for the past few months pt is hard time walking due to the pain pt also has dizziness    Please give pt a call back     Thank you

## 2025-04-29 NOTE — TELEPHONE ENCOUNTER
Returned call to spouse, patient is having severe leg cramping and muscle pains for months.  Has hard time walking and dizziness.  Has had visits with PCP, Neurology and testing . Labs including Iron, CXR for sob also Pulm testing .  She is requesting that Dr Jacobo review testing and give then guidance asap. MRI's etc.  It was suggested by Neurology this could be related to his anemia and iron issues    Denies fever or chills, some sob with exertion(x - ray negative. Denies n/v/d, appetite okay. Has lightheadedness with ambulating, no urinary or bowel complaints.    Has apt with GI tomorrow for Colonoscopy to check for bleeding as well.    They are requesting a call back with information/instructions.

## 2025-05-05 ENCOUNTER — PATIENT MESSAGE (OUTPATIENT)
Dept: SURGERY | Facility: CLINIC | Age: 80
End: 2025-05-05

## 2025-05-05 ENCOUNTER — OFFICE VISIT (OUTPATIENT)
Age: 80
End: 2025-05-05
Attending: INTERNAL MEDICINE
Payer: MEDICARE

## 2025-05-05 VITALS
RESPIRATION RATE: 16 BRPM | HEART RATE: 59 BPM | TEMPERATURE: 97 F | DIASTOLIC BLOOD PRESSURE: 76 MMHG | BODY MASS INDEX: 23 KG/M2 | SYSTOLIC BLOOD PRESSURE: 131 MMHG | WEIGHT: 170.5 LBS | OXYGEN SATURATION: 96 %

## 2025-05-05 DIAGNOSIS — M79.604 RIGHT LEG PAIN: ICD-10-CM

## 2025-05-05 DIAGNOSIS — D50.0 IRON DEFICIENCY ANEMIA SECONDARY TO BLOOD LOSS (CHRONIC): Primary | ICD-10-CM

## 2025-05-05 DIAGNOSIS — N30.00 ACUTE CYSTITIS WITHOUT HEMATURIA: ICD-10-CM

## 2025-05-05 LAB
ALBUMIN SERPL-MCNC: 4.4 G/DL (ref 3.2–4.8)
ALBUMIN/GLOB SERPL: 1.5 {RATIO} (ref 1–2)
ALP LIVER SERPL-CCNC: 84 U/L (ref 45–117)
ALT SERPL-CCNC: 16 U/L (ref 10–49)
ANION GAP SERPL CALC-SCNC: 7 MMOL/L (ref 0–18)
AST SERPL-CCNC: 27 U/L (ref ?–34)
BILIRUB SERPL-MCNC: 0.2 MG/DL (ref 0.2–1.1)
BILIRUB UR QL STRIP.AUTO: NEGATIVE
BUN BLD-MCNC: 31 MG/DL (ref 9–23)
CALCIUM BLD-MCNC: 9.3 MG/DL (ref 8.7–10.6)
CHLORIDE SERPL-SCNC: 105 MMOL/L (ref 98–112)
CO2 SERPL-SCNC: 29 MMOL/L (ref 21–32)
CREAT BLD-MCNC: 1.35 MG/DL (ref 0.7–1.3)
EGFRCR SERPLBLD CKD-EPI 2021: 53 ML/MIN/1.73M2 (ref 60–?)
GLOBULIN PLAS-MCNC: 3 G/DL (ref 2–3.5)
GLUCOSE BLD-MCNC: 96 MG/DL (ref 70–99)
GLUCOSE UR STRIP.AUTO-MCNC: NORMAL MG/DL
KETONES UR STRIP.AUTO-MCNC: NEGATIVE MG/DL
LDH SERPL L TO P-CCNC: 219 U/L (ref 120–246)
LEUKOCYTE ESTERASE UR QL STRIP.AUTO: 500
OSMOLALITY SERPL CALC.SUM OF ELEC: 298 MOSM/KG (ref 275–295)
PH UR STRIP.AUTO: 7 [PH] (ref 5–8)
POTASSIUM SERPL-SCNC: 4.8 MMOL/L (ref 3.5–5.1)
PROT SERPL-MCNC: 7.4 G/DL (ref 5.7–8.2)
PROT UR STRIP.AUTO-MCNC: NEGATIVE MG/DL
RBC UR QL AUTO: NEGATIVE
SODIUM SERPL-SCNC: 141 MMOL/L (ref 136–145)
SP GR UR STRIP.AUTO: 1.01 (ref 1–1.03)
UROBILINOGEN UR STRIP.AUTO-MCNC: NORMAL MG/DL
WBC #/AREA URNS AUTO: >50 /HPF

## 2025-05-05 RX ORDER — FERROUS SULFATE 325(65) MG
325 TABLET, DELAYED RELEASE (ENTERIC COATED) ORAL
COMMUNITY

## 2025-05-05 NOTE — PROGRESS NOTES
Patient here for re-consult for iron deficiency anemia. Patient last saw Dr Jacobo in 2020 for iron deficiency. Patient c/o fatigue, dyspnea, right leg pain and bilateral involuntary leg \"jerking\". Patient had labs on 3/13 with a Ferritin of 11. Patient taking PO OTC iron supplements with no c/o side effects. Patient completed MRI of cervical spine and brain on 4/9/25. Patient is scheduled for colonoscopy end of this Month. Patient denies bleeding issues. Patient is accompanied by his wife.     Education Record    Learner:  Patient and Spouse    Disease / Diagnosis: iron deficiency     Barriers / Limitations:  None   Comments:    Method:  Discussion   Comments:    General Topics:  Medication and Plan of care reviewed   Comments:    Outcome:  Shows understanding   Comments:

## 2025-05-05 NOTE — CONSULTS
[facility] Center Report of Consultation    Patient Name: Dev Johnston   YOB: 1945   Medical Record Number: [id number]   CSN: [id number]   Consulting Physician: Elvis Jacobo MD  Referring Physician(s): No ref. provider found  Date of Consultation: 5/5/2025     Reason for Consultation:  Dev Johnston was seen today in the [facility] for evaluation and management of iron deficiency anemia.    History of Present Illness:     History of Present Illness  Skip Johnston is an 79 year old male with anemia and prostate issues who presents with worsening anemia and right thigh pain.    He has a history of anemia with hemoglobin levels decreasing from 13.1 in July 2022 to 10.6 in November 2022, coinciding with a hospitalization for difficulty breathing, urinary tract infection, dehydration, and anemia. His hemoglobin levels have fluctuated since, with a recent drop to 9 in March 2025, and a slight increase to 10.6 in April 2025. He has been taking over-the-counter iron pills, which he tolerates well, and his hemoglobin has improved to 11.3. No changes in bowel movements, difficulty swallowing, nausea, vomiting, or weight loss.    He has a history of urinary tract infections, with the most recent episode in September 2023. He has prostate issues and underwent a Urolift procedure. Despite this, he continues to experience urinary tract infections.    He experiences chronic right thigh pain, which has worsened over the years. The pain is primarily in the upper leg and is exacerbated by walking, though it does not affect his sleep. He has been on gabapentin for restless legs and jerking movements during the night, which were evaluated by a neurologist with MRIs and testing.    He has a history of shortness of breath, which occurs with minimal activity. A recent chest x-ray and pulmonary function test were conducted. He had a swallow study in October 2024 due to previous aspiration concerns,  which was normal.    He underwent an MRI of the lumbar spine in 2023 due to a history of back surgery for compression fractures at L1 and L5, which showed severe spinal canal stenosis at L4-L5. However, the neurologist did not attribute his leg pain to spinal stenosis.    He has a history of coronary artery disease with a coronary stent, and he takes aspirin daily. He also has a history of hemorrhoids and underwent an upper and lower endoscopy in .    Past Medical History:  Past Medical History[1]    Past Surgical History:  Past Surgical History[2]    Family Medical History:  Family History[3]    Psychosocial History:  Social History     Socioeconomic History    Marital status: Life Partner     Spouse name: Not on file    Number of children: Not on file    Years of education: Not on file    Highest education level: Not on file   Occupational History    Not on file   Tobacco Use    Smoking status: Former     Current packs/day: 0.00     Average packs/day: 1 pack/day for 43.3 years (43.3 ttl pk-yrs)     Types: Cigarettes     Start date: 1968     Quit date: 2011     Years since quittin.0    Smokeless tobacco: Never   Vaping Use    Vaping status: Never Used   Substance and Sexual Activity    Alcohol use: No     Comment: Quit in . Prior had been a heavier drinker.    Drug use: Not Currently    Sexual activity: Not on file   Other Topics Concern     Service Not Asked    Blood Transfusions Not Asked    Caffeine Concern Yes    Occupational Exposure Not Asked    Hobby Hazards Not Asked    Sleep Concern Not Asked    Stress Concern Not Asked    Weight Concern Not Asked    Special Diet Not Asked    Back Care Not Asked    Exercise Yes     Comment: 6x/week    Bike Helmet Not Asked    Seat Belt Not Asked    Self-Exams Not Asked   Social History Narrative    Not on file     Social Drivers of Health     Food Insecurity: No Food Insecurity (2024)    Food Insecurity     Food Insecurity: Never  true   Transportation Needs: No Transportation Needs (1/29/2024)    Transportation Needs     Lack of Transportation: No   Housing Stability: Low Risk  (1/29/2024)    Housing Stability     Housing Instability: No     Housing Instability Emergency: Not on file     Crib or Bassinette: Not on file       Allergies:   Allergies[4]    Current Medications:  Medications - Current[5]    Review of Systems:  Constitutional: Negative for anorexia, fevers, chills, and weight loss.  Eyes: Negative for visual disturbance, irritation and redness.  Cardiovascular: Negative for angina, orthopnea or palpitations.  Gastrointestinal: Negative for nausea, vomiting, change in bowel habits, diarrhea, constipation and abdominal pain.  Integument/breast: Negative for rash, skin lesions, and pruritus.  Hematologic/lymphatic: Negative for easy bruising, bleeding, and lymphadenopathy.  Musculoskeletal: Negative for myalgias, arthralgias, muscle weakness.  Psychiatric: The patient's mood was calm and appropriate for this visit.    The pertinent positives and negatives were described in the HPI and above. All other systems were negative.    Vital Signs:  Height: --  Weight: 77.3 kg (170 lb 8 oz) (05/05 1002)  BSA (Calculated - sq m): --  Pulse: 59 (05/05 1002)  BP: 131/76 (05/05 1002)  Temp: 97.1 °F (36.2 °C) (05/05 1002)  Do Not Use - Resp Rate: --  SpO2: 96 % (05/05 1002)    Physical Examination:    Constitutional: Patient is alert and oriented x 3, not in acute distress.  HEENT:  Oropharynx is clear. Neck is supple.  Eyes: Anicteric sclera. Pink conjunctiva.  Respiratory: Clear to auscultation and percussion. No rales.  No wheezes.  Cardiovascular: Regular rate and rhythm.   Gastrointestinal: Soft, non tender with good bowel sounds.  Extremities: No edema. No calf tenderness.  Lymphatics: There is no palpable lymphadenopathy throughout in the cervical, supraclavicular, or axillary regions.    Results reviewed at this visit:  Lab Results    Component Value Date    WBC 6.3 04/15/2025    RBC 4.00 04/15/2025    HGB 11.3 (L) 04/15/2025    HCT 36.3 (L) 04/15/2025    MCV 90.8 04/15/2025    MCH 28.3 04/15/2025    MCHC 31.1 04/15/2025    RDW 22.9 04/15/2025    .0 04/15/2025    MPV 9.8 05/27/2011     Lab Results   Component Value Date     05/05/2025    K 4.8 05/05/2025     05/05/2025    CO2 29.0 05/05/2025    BUN 31 (H) 05/05/2025    CREATSERUM 1.35 (H) 05/05/2025    GLU 96 05/05/2025    CA 9.3 05/05/2025    ALKPHO 84 05/05/2025    ALT 16 05/05/2025    AST 27 05/05/2025    BILT 0.2 05/05/2025    ALB 4.4 05/05/2025    TP 7.4 05/05/2025     Lab Component   Component Value Date/Time     05/05/2025 1111          Component  Ref Range & Units (hover) 3/13/25 0905   Ferritin 11 Low             Component  Ref Range & Units (hover) 5/5/25 1111   Urine Color Light-Yellow   Clarity Urine Turbid Abnormal    Spec Gravity 1.010   Glucose Urine Normal   Bilirubin Urine Negative   Ketones Urine Negative   Blood Urine Negative   pH Urine 7.0   Protein Urine Negative   Urobilinogen Urine Normal   Nitrite Urine 1+ Abnormal    Leukocyte Esterase Urine 500 Abnormal    Comment:  Gordo/uL Interpretation  25          Trace  75          1+  250         2+  500         3+   WBC Urine >50 Abnormal    RBC Urine 3-5 Abnormal    Bacteria Urine Rare Abnormal    Squamous Epi. Cells None Seen   Renal Tubular Epithelial Cells None Seen   Transitional Cells None Seen   Yeast Urine None Seen        Results  LABS  WBC: 4.5 (08/2020)  Hb: 13.1 (08/2020)  PLT: 209 (08/2020)  MCV: 107 (08/2020)  Hb: 13.1 (07/2022)  MCV: 104 (07/2022)  Hb: 10.6 (11/2022)  Hb: 11.2 (11/2022)  Hb: 12.6 (07/2023)  Hb: 10.3 (09/2023)  Hb: 12.1 (01/2024)  Hb: 11.7 (08/2024)  Hb: 9 (03/2025)  Hb: 10.6 (04/01/2025)  MCV: 90 (04/01/2025)  Hb: 10.5 (04/15/2025)  Hb: 11.3 (04/2025)  Iron: very low (03/13/2025)  Ferritin: 11 (03/13/2025)    RADIOLOGY  Cervical spine MRI: mild to moderate arthritis in  C4-C5, narrowing at C4-C5 (04/09/2025)  Brain MRI: no acute intracranial process, moderate small vessel ischemic changes (04/09/2025)  CT abdomen and pelvis: no acute abnormality (09/2023)  Lumbar spine MRI: moderate acute superior end plate compression deformity of L1 and L5, severe spinal canal stenosis at L4-L5 (08/2023)    DIAGNOSTIC  Swallow study: no aspiration (10/2024)    PATHOLOGY  Prostate biopsy: benign (2016)       Assessment/Plan:    Assessment & Plan  Iron deficiency anemia secondary to chronic blood loss:  Chronic iron deficiency anemia with fluctuating hemoglobin levels, currently at 11.3. MCV has normalized to 90. Ferritin level is low at 11, indicating low iron stores. Oral iron supplementation has improved hemoglobin levels. Suspected chronic blood loss, potentially from the gastrointestinal tract, is the underlying cause. Differential diagnosis includes malabsorption issues, though less likely. Discussed the importance of identifying the source of bleeding. Explained that IV iron infusion will replenish iron stores but not address the underlying cause.  - Schedule IV iron infusion to replenish iron stores (Infed 1000 mg IVPB).  - Discontinue oral iron supplementation after IV iron infusion.  - Order CBC and ferritin level in four weeks to assess response to IV iron.  - Schedule colonoscopy and upper endoscopy to investigate potential sources of gastrointestinal blood loss.  - Order full chemistry panel to assess liver enzymes and kidney function. (Normal on today's labs)  - Order LDH test to evaluate for potential underlying conditions. (Normal LDH today)  - Check urine sample for microscopic hematuria to rule out urinary source of blood loss. (UA shows infection)    Shortness of breath  Chronic dyspnea, primarily on exertion. Pulmonologist Dr. Rossy Herrera is involved in management. Recent chest X-ray and pulmonary function tests conducted. Dyspnea may be related to anemia, though not the sole  cause.    Right thigh pain  Chronic right thigh pain, worsening over years, primarily with walking and absent at rest. Previous evaluations have not identified a clear cause. Differential diagnosis includes musculoskeletal issues, possibly related to previous spinal issues or muscle strain. Pain is unlikely related to iron deficiency anemia.  - Order X-ray of the right thigh to evaluate for potential musculoskeletal causes.  - Consider referral to orthopedics for further evaluation if X-ray is inconclusive.    Restless legs syndrome  Restless legs syndrome managed with gabapentin. Symptoms include jerking movements at night. Will assess after iron infusion to see if this helped.    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 No acute intervention required.    Moderate small vessel ischemic changes  Moderate small vessel ischemic changes on recent brain MRI. Common with aging and not associated with acute symptoms.    Urinary tract infection  Recurrent urinary tract infections, with the most recent episode in September 2023. No current symptoms. Previous episodes may have contributed to anemia during acute illness. Current UA is positive for UTI. Will contact his PCP for management.    Follow-up  - Schedule follow-up appointment in two months to review results of GI workup and assess response to IV iron infusion.         The following individual(s) verbally consented to be recorded using ambient AI listening technology and understand that they can each withdraw their consent to this listening technology at any point by asking the clinician to turn off or pause the recording:    Patient name: Dev Landers Vickie Jacobo MD        [1]   Past Medical History:   Adverse drug reaction    Age-related cataract of both eyes    Arthritis    Asthma (HCC)    Back pain    Back problem    Benign neoplasm of colon    BPH  (benign prostatic hyperplasia)    Calculus of kidney    COPD (chronic obstructive pulmonary disease) (HCC)    NO HOME O2    Coronary atherosclerosis    Diverticulosis of colon (without mention of hemorrhage)    Easy bruising    Esophageal reflux    Frequent urination    Frequent use of laxatives    Heartburn    High blood pressure    High cholesterol    History of cardiac murmur    Prostate nodule    Had biopsy 2016    Stented coronary artery    one    Unspecified hemorrhoids without mention of complication    Visual impairment    READING GLASSES    Wears glasses   [2]   Past Surgical History:  Procedure Laterality Date    Angioplasty (coronary)  05/2011    Appendectomy  1949    Cataract Bilateral 11/2021    cataract removed    Colonoscopy  11/2012    + polyps    Insert intracoronary stent      Other surgical history  02/02/2022    Cystoscopy Dr. Olmos     Other surgical history  01/29/2024    LUMBAR 3 AND LUMBAR 4 LAMINECTOMIES WITH MEDIAL FACETECTOMIES    Tonsillectomy      Upper gi endoscopy - referral  03/22/2012    esophagitis, repeat in 5 year   [3]   Family History  Problem Relation Age of Onset    Breast Cancer Sister     Other (CABG) Father         x4    Other (esophogeal reflux) Mother     Other (hip replacement) Mother    [4]   Allergies  Allergen Reactions    Cephalexin DIZZINESS and SHORTNESS OF BREATH   [5]   Current Outpatient Medications:     ferrous sulfate 325 (65 FE) MG Oral Tab EC, Take 1 tablet (325 mg total) by mouth daily with breakfast., Disp: , Rfl:     gabapentin 100 MG Oral Cap, Take 100 mg ( 1 capsule) ~ 5 PM and 100 mg (1 capsule) ~ 9 PM prior to bedtime, Disp: 60 capsule, Rfl: 0    sertraline 100 MG Oral Tab, Take 1 tablet (100 mg total) by mouth daily., Disp: 90 tablet, Rfl: 1    silodosin (RAPAFLO) 8 MG Oral Cap, Take 1 capsule (8 mg total) by mouth daily., Disp: 90 capsule, Rfl: 5    finasteride 5 MG Oral Tab, Take 1 tablet (5 mg total) by mouth daily., Disp: 90 tablet, Rfl: 5     Omeprazole 20 MG Oral Tab EC, , Disp: , Rfl:     simvastatin 20 MG Oral Tab, TAKE 1 TABLET BY MOUTH EVERY NIGHT AT BEDTIME, Disp: 90 tablet, Rfl: 3    albuterol 108 (90 Base) MCG/ACT Inhalation Aero Soln, Inhale 2 puffs into the lungs every 6 (six) hours as needed for Wheezing., Disp: 1 each, Rfl: 3    Meloxicam 15 MG Oral Tab, Take 1 tablet (15 mg total) by mouth daily., Disp: 90 tablet, Rfl: 1    TRELEGY ELLIPTA 200-62.5-25 MCG/ACT Inhalation Aerosol Powder, Breath Activated, Inhale 1 puff into the lungs daily., Disp: 3 each, Rfl: 3    aspirin (EQ ASPIRIN ADULT LOW DOSE) 81 MG Oral Tab EC, , Disp: , Rfl:     Sildenafil Citrate 100 MG Oral Tab, Take 1 tablet (100 mg total) by mouth as needed for Erectile Dysfunction., Disp: 4 tablet, Rfl: 11

## 2025-05-06 ENCOUNTER — HOSPITAL ENCOUNTER (OUTPATIENT)
Dept: GENERAL RADIOLOGY | Age: 80
Discharge: HOME | End: 2025-05-06
Attending: INTERNAL MEDICINE
Payer: MEDICARE

## 2025-05-06 ENCOUNTER — TELEPHONE (OUTPATIENT)
Dept: FAMILY MEDICINE CLINIC | Facility: CLINIC | Age: 80
End: 2025-05-06

## 2025-05-06 ENCOUNTER — TELEPHONE (OUTPATIENT)
Dept: SURGERY | Facility: CLINIC | Age: 80
End: 2025-05-06

## 2025-05-06 DIAGNOSIS — M79.604 RIGHT LEG PAIN: ICD-10-CM

## 2025-05-06 DIAGNOSIS — D50.0 IRON DEFICIENCY ANEMIA SECONDARY TO BLOOD LOSS (CHRONIC): ICD-10-CM

## 2025-05-06 PROCEDURE — 73552 X-RAY EXAM OF FEMUR 2/>: CPT | Performed by: INTERNAL MEDICINE

## 2025-05-06 NOTE — TELEPHONE ENCOUNTER
Spoke with Adrienne and informed of Dr. Kaur's response   Adrienne verbalized understanding and will keep upcoming appt  Future Appointments   Date Time Provider Department Center   5/12/2025  2:00 PM Duran Kaur MD [id number]   6/11/2025  9:40 AM Geoff Cespedes MD [facility]   7/11/2025 10:30 AM NP [facility] NP [facility] [place] [facility]   7/11/2025 11:15 AM Elvis Jacobo MD NP [facility] [place] [facility]     This encounter is now closed

## 2025-05-06 NOTE — TELEPHONE ENCOUNTER
Patient life partner came in wanting to leave a message for  in regards to urine test note didn't come across through [facility]. They have contacted Dr. Kaur his urologist.     Patient is no follow-up with urologist about the urine test.      Just wanted to inform Dr. CONDE

## 2025-05-06 NOTE — TELEPHONE ENCOUNTER
LOV: 10/28/24  Spoke with Adrienne who states pt, is seeing Dr. Jacobo for iron deficiency. A ua/m with culture was ordered to see where blood is coming from.   Pt with positive UTI on 05/05/25  URINE CULTURE >100,000 CFU/ML Escherichia coli Abnormal       Adrienne states pt denies any urinary symptoms.  Per Adrienne, Dr. Jacobo informed her to f/u with PCP-Dr. Mujica. Dr. Mujica then told her to f/u with Dr. Kaur regarding results/treatment.   Appt scheduled with Dr. Kaur- 6 month f/u     Dr. Kaur: Pt had positive UC on 05/05 and was instructed by PCP to call you regarding treatment. Pt denies any symptoms.

## 2025-05-06 NOTE — TELEPHONE ENCOUNTER
Adrienne, calling to inform that Dr. Masterson had place a urine culture and results show positive for infection. Please contact Adrienne for plan of care at [phone],thanks .

## 2025-05-07 ENCOUNTER — TELEPHONE (OUTPATIENT)
Dept: FAMILY MEDICINE CLINIC | Facility: CLINIC | Age: 80
End: 2025-05-07

## 2025-05-07 DIAGNOSIS — N39.0 URINARY TRACT INFECTION WITHOUT HEMATURIA, SITE UNSPECIFIED: Primary | ICD-10-CM

## 2025-05-07 RX ORDER — NITROFURANTOIN 25; 75 MG/1; MG/1
100 CAPSULE ORAL 2 TIMES DAILY
Qty: 14 CAPSULE | Refills: 0 | Status: SHIPPED | OUTPATIENT
Start: 2025-05-07

## 2025-05-07 NOTE — TELEPHONE ENCOUNTER
I reviewed his culture, will send in treatment now that it is back with sensitivities, recommend re-check in 3 weeks with culture (2 weeks after treatment course)    Tim Mujica MD

## 2025-05-07 NOTE — TELEPHONE ENCOUNTER
Spoke with daughter. Will start on Macrobid and retest  in 3 weeks as per Dr Mujica's instruction. Patient has OV with urology, Dr.Michael Kaur on Monday 05/12/25

## 2025-05-07 NOTE — TELEPHONE ENCOUNTER
----- Message from Lalitha PLAZA sent at 5/7/2025 11:09 AM CDT -----  Spoke with patient and he denies any urinary symptoms at this time.  ----- Message -----  From: Tim Mujica MD  Sent: 5/6/2025   7:30 AM CDT  To: [facility] Clinical Staff    Results reviewed. Can we see if he is symptomatic for a UTI? If not will await culture, if so then would start something sooner    Tim Mujica MD    ----- Message -----  From: Elvis Jacobo MD  Sent: 5/5/2025   3:07 PM CDT  To: Tim Mujica MD  Subject: UTI                                              Tim,  I saw this patient for his iron deficiency. I sent a UA to see if this is contributing to iron loss. He has a UTI. Can you manage this. Josue Gomez  ----- Message -----  From: Lab, Background User  Sent: 5/5/2025  11:40 AM CDT  To: Elvis Jacobo MD

## 2025-05-12 ENCOUNTER — OFFICE VISIT (OUTPATIENT)
Dept: SURGERY | Facility: CLINIC | Age: 80
End: 2025-05-12
Payer: MEDICARE

## 2025-05-12 DIAGNOSIS — N39.0 RECURRENT UTI: Primary | ICD-10-CM

## 2025-05-12 DIAGNOSIS — R82.71 BACTERIURIA, ASYMPTOMATIC: ICD-10-CM

## 2025-05-12 DIAGNOSIS — N20.0 RECURRENT KIDNEY STONES: ICD-10-CM

## 2025-05-12 LAB
APPEARANCE: CLEAR
BILIRUBIN: NEGATIVE
GLUCOSE (URINE DIPSTICK): NEGATIVE MG/DL
KETONES (URINE DIPSTICK): NEGATIVE MG/DL
MULTISTIX LOT#: ABNORMAL NUMERIC
NITRITE, URINE: NEGATIVE
PH, URINE: 6 (ref 4.5–8)
PROTEIN (URINE DIPSTICK): NEGATIVE MG/DL
SPECIFIC GRAVITY: 1.01 (ref 1–1.03)
URINE-COLOR: YELLOW
UROBILINOGEN,SEMI-QN: 0.2 MG/DL (ref 0–1.9)

## 2025-05-12 PROCEDURE — G2211 COMPLEX E/M VISIT ADD ON: HCPCS | Performed by: UROLOGY

## 2025-05-12 PROCEDURE — 1159F MED LIST DOCD IN RCRD: CPT | Performed by: UROLOGY

## 2025-05-12 PROCEDURE — 81002 URINALYSIS NONAUTO W/O SCOPE: CPT | Performed by: UROLOGY

## 2025-05-12 PROCEDURE — 51798 US URINE CAPACITY MEASURE: CPT | Performed by: UROLOGY

## 2025-05-12 PROCEDURE — 99215 OFFICE O/P EST HI 40 MIN: CPT | Performed by: UROLOGY

## 2025-05-12 NOTE — PROGRESS NOTES
HPI:     Dev Johnston is a 79 year old male with a PMH of HTN, HL, GERD OA.    Following for:  1. BPH/LUTS + h/o bladder stones  - s/p urolift and cystolithopaxy ([facility]) 3/22/22  - carlos/pro since ~ 2014, stopped ~ 2022, back on proscar 9/25/23 and rapaflo but just stopped proscar 4/20/24 d/t lightheadedness  - Cysto with me (10/23/23): mod BPH with very high riding neck in the mid-prostate up to the bladder which is likely iatrogenic/due to prior urolift. Mod BREANA. Patulous right ureter presumably indicating reflux.  2. chronic right hydro  - noted on CT since 2012 and stable  3. Recurrent MDR UTIs  - UCx 9/11/23, 11/21/22 MDR E coli S to gent, macrobid, zosyn  3. PVD  - Kegels reviewed  4. ED  - 100 mg viagra prn  5. Fam h/o CaP  - dad in 60s tx with XRT    PCP - Guanako [facility] Urologist - [facility] 4/6/22  Cards - Aki  [facility]  Heme - Donnell    LOV 10/28/2024    Presents for check-up, review recent UCx and discuss next steps. He is taking rapaflo, proscar for BPH/LUTS. He has anemia - seeing Dr Jacobo, who ordered UCx which was positive and presents today to discuss.  Had back surgery last year and still with shooting pains down R leg.  He wanted to read two pages of notes he had regarding multiple medical issues today and I allowed him to read this to me today.  Dealing with restless legs v sciatica. Completed PT which did not help.  Taking rapaflo, proscar and no LUTS or UTI symptoms at this time.    Had urolift in March 2022, stopped carlos/pro and developed 2 septic UTIs afterwards requiring hospitalization. No prior UTIs.  Admitted from 9/12/23 - 9/16/23 with UTI, bacteremia. He had severe right flank pain while admitted but has two fractured vertebrae which may have been contributing.     Exercise: yes  Snoring: none  No interim UTIs.    AUA SS is 4/35 with 2 n, f. Was 17/35 prior to carlos/pro with 4 n; 3 w, I, FERNANDO; 2 u; 1 f. Happy with LUTS.  Incontinence: PVD which developed after urolift  and using 1 PPD and damp. He is doing Kegels  Pensocrotal LOV: no abnormalities  BILLY LOV: ~ 40 g prostate, no nodules or tenderness    UA is negative and PVR is 58, Was 83 mL, zero    Hip/back pain: yes - b/l  Diarrhea/constipation: none  UTI hx: 2 since urolift. Nothing prior  Gross hematuria: 5-6 y ago  Tobacco hx: 30 pack years, quit 2006  Kidney stone hx: none    < 5 % potency without meds and poor potency with 100 mg viagra prn.  Discussed a trial of 20 mg cialis versus trimix teaching and risks and benefits to both.  He declines trimix teaching and would like to try cialis.    PSA 0.42 11/11/24    CT SP 9/12/23: chronic, low lying right kidney with probable chronic mild-mod right UPJO, stable from CT in Nov 2022 and stable from CT report 1/9/12    Reported no plain water, 40-60 dilute tea oz water with clear urine. Now 60 water, no dilute tea with clear urine. I encouraged the pt continue to drink at least 40-60 oz water per day or enough to keep urine clear to pale yellow for UTI prevention.    Have discussed the right hydronephrosis appears stable since at least 2012. He has a patulous right distal ureteral orifice so may have a mild component of reflux as well.    For nocturia, I'd recommend the patient drink plenty of fluids first thing in the morning and avoid drinking anything at least 2-3 hours prior to bedtime. If the patient takes diuretic I would recommend they take them first thing in the morning. If the patient takes NSAIDs I would consider they switch to taking prior to bedtime. I'd also encourage regular physical activity (for at least 30 minutes at least three times per week) as this has been shown to help with nocturia. Finally we discussed that setting a regular bedtime can help regulate nocturnal levels of melatonin and ADH, which can help with sleep and reduce nocturia.    We discussed that they may have chronic colonization of the urinary tract and that they may have bacteria on UCx every  time we check. Studies have shown that abx should be used sparingly in patients with suspected chronic colonization as they have high risk of developing drug resistant organisms as well as C diff.    He will continue significantly increased water intake and has cut back on tea for adequate hydration/UTI prevention. Start exercising and consider switching meloxicam to qhs for nocturia with fluid adjustments for nocturia. Continue rapaflo, proscar for BPH/LUTS. Continue Kegels. May continue 100 mg viagra prn. Checking [facility] UCx panel but would not recommend starting abx unless he develops UTI symptoms. Consider TURP later if UTI recurs. Checking CT SP to evaluate for chronic bacteriuria. If CT negative and pt doing well he will f/u in 6 mo for check-up.    I spent over 40 minutes in consultation and coordination of this patient's care today including review of pertinent labs, imaging, records with > 50% of time spent counseling.    Prior note:  As symptoms worsened since urolift I would recommend cysto at some point in the near future to determine if foreign bodies are noted within the lumen that could be contributing to issues.    For nocturia, I'd recommend the patient drink plenty of fluids first thing in the morning and avoid drinking anything at least 2-3 hours prior to bedtime. If the patient takes diuretic I would recommend they take them first thing in the morning. If the patient takes NSAIDs I would consider they switch to taking prior to bedtime. I'd also encourage regular physical activity (for at least 30 minutes at least three times per week) as this has been shown to help with nocturia. Finally we discussed that setting a regular bedtime can help regulate nocturnal levels of melatonin and ADH, which can help with sleep and reduce nocturia.    HISTORY:  Past Medical History:    Adverse drug reaction    Age-related cataract of both eyes    Anxiety    Arthritis    Asthma (HCC)    Atherosclerosis of  coronary artery    Back pain    Back problem    Benign neoplasm of colon    BPH (benign prostatic hyperplasia)    Calculus of kidney    COPD (chronic obstructive pulmonary disease) (HCC)    NO HOME O2    Coronary atherosclerosis    Depression    Diverticulosis of colon (without mention of hemorrhage)    Easy bruising    Esophageal reflux    Flatulence/gas pain/belching    Frequent urination    Frequent use of laxatives    Heartburn    Hemorrhoids    High blood pressure    High cholesterol    History of cardiac murmur    Prostate nodule    Had biopsy 2016    Stented coronary artery    one    Unspecified hemorrhoids without mention of complication    Visual impairment    READING GLASSES    Wears glasses    Wheezing      Past Surgical History:   Procedure Laterality Date    Angioplasty (coronary)  2011    Appendectomy  1949    Appendectomy  89    Cataract Bilateral 2021    cataract removed    Colonoscopy  2012    + polyps    Colonoscopy  2012    Insert intracoronary stent      Other surgical history  2022    Cystoscopy Dr. Olmos     Other surgical history  2024    LUMBAR 3 AND LUMBAR 4 LAMINECTOMIES WITH MEDIAL FACETECTOMIES    Tonsillectomy      Upper gi endoscopy - referral  2012    esophagitis, repeat in 5 year      Family History   Problem Relation Age of Onset    Breast Cancer Sister     Other (CABG) Father         x4    Heart Disorder Father     Other (esophogeal reflux) Mother     Other (hip replacement) Mother     Cancer Mother       Social History:   Social History     Socioeconomic History    Marital status: Life Partner   Tobacco Use    Smoking status: Former     Current packs/day: 0.00     Average packs/day: 1 pack/day for 43.3 years (43.3 ttl pk-yrs)     Types: Cigarettes     Start date: 1968     Quit date: 2011     Years since quittin.0    Smokeless tobacco: Never   Vaping Use    Vaping status: Never Used   Substance and Sexual Activity    Alcohol use: Not  Currently     Comment: Quit in 2011. Prior had been a heavier drinker.    Drug use: Not Currently   Other Topics Concern    Caffeine Concern Yes    Exercise Yes     Comment: 6x/week     Social Drivers of Health     Food Insecurity: No Food Insecurity (1/29/2024)    Food Insecurity     Food Insecurity: Never true   Transportation Needs: No Transportation Needs (1/29/2024)    Transportation Needs     Lack of Transportation: No   Housing Stability: Low Risk  (1/29/2024)    Housing Stability     Housing Instability: No          Medications (Active prior to today's visit):  Current Outpatient Medications   Medication Sig Dispense Refill    nitrofurantoin monohydrate macro 100 MG Oral Cap Take 1 capsule (100 mg total) by mouth 2 (two) times daily. 14 capsule 0    gabapentin 100 MG Oral Cap Take 100 mg ( 1 capsule) ~ 5 PM and 100 mg (1 capsule) ~ 9 PM prior to bedtime 60 capsule 0    sertraline 100 MG Oral Tab Take 1 tablet (100 mg total) by mouth daily. 90 tablet 1    silodosin (RAPAFLO) 8 MG Oral Cap Take 1 capsule (8 mg total) by mouth daily. 90 capsule 5    finasteride 5 MG Oral Tab Take 1 tablet (5 mg total) by mouth daily. 90 tablet 5    Omeprazole 20 MG Oral Tab EC       simvastatin 20 MG Oral Tab TAKE 1 TABLET BY MOUTH EVERY NIGHT AT BEDTIME 90 tablet 3    albuterol 108 (90 Base) MCG/ACT Inhalation Aero Soln Inhale 2 puffs into the lungs every 6 (six) hours as needed for Wheezing. 1 each 3    Meloxicam 15 MG Oral Tab Take 1 tablet (15 mg total) by mouth daily. 90 tablet 1    TRELEGY ELLIPTA 200-62.5-25 MCG/ACT Inhalation Aerosol Powder, Breath Activated Inhale 1 puff into the lungs daily. 3 each 3    aspirin (EQ ASPIRIN ADULT LOW DOSE) 81 MG Oral Tab EC       Sildenafil Citrate 100 MG Oral Tab Take 1 tablet (100 mg total) by mouth as needed for Erectile Dysfunction. 4 tablet 11    ferrous sulfate 325 (65 FE) MG Oral Tab EC Take 1 tablet (325 mg total) by mouth daily with breakfast. (Patient not taking: Reported on  5/12/2025)         Allergies:  Allergies   Allergen Reactions    Cephalexin DIZZINESS and SHORTNESS OF BREATH         ROS:     A comprehensive 10 point review of systems was completed.  Pertinent positives and negatives noted in the the HPI.    PHYSICAL EXAM:     GENERAL APPEARANCE: well, developed, well nourished, in no acute distress  NEUROLOGIC: nonfocal, alert and oriented  HEAD: normocephalic, atraumatic  EYES: sclera non-icteric  EARS: hearing intact  ORAL CAVITY: mucosa moist  NECK/THYROID: no obvious goiter or masses  LUNGS: nonlabored breathing  ABDOMEN: soft, no obvious masses or tenderness  SKIN: no obvious rashes    : as noted above    ASSESSMENT/PLAN:   Diagnoses and all orders for this visit:    Recurrent UTI  -     URINALYSIS NONAUTO W/O SCOPE    Recurrent kidney stones  -     CT ABDOMEN+PELVIS KIDNEYSTONE 2D RNDR(NO IV,NO ORAL)(CPT=74176); Future    Bacteriuria, asymptomatic    - as noted above.    Thanks again for this consult.    Duran Kaur MD, FACS  Urologist  [facility]  Office: [phone]

## 2025-05-13 ENCOUNTER — OFFICE VISIT (OUTPATIENT)
Age: 80
End: 2025-05-13
Attending: INTERNAL MEDICINE
Payer: MEDICARE

## 2025-05-13 VITALS
RESPIRATION RATE: 16 BRPM | HEART RATE: 68 BPM | SYSTOLIC BLOOD PRESSURE: 151 MMHG | OXYGEN SATURATION: 93 % | DIASTOLIC BLOOD PRESSURE: 72 MMHG | TEMPERATURE: 97 F

## 2025-05-13 DIAGNOSIS — D50.0 IRON DEFICIENCY ANEMIA SECONDARY TO BLOOD LOSS (CHRONIC): Primary | ICD-10-CM

## 2025-05-13 NOTE — PROGRESS NOTES
Education Record    Learner:  Patient    Disease / Diagnosis: PHILIP    Barriers / Limitations:  None   Comments:    Method:  Brief focused   Comments:    General Topics:  Plan of care reviewed   Comments:    Outcome:  Shows understanding   Comments:    First time infed - tolerated well without issue. Future appts made.

## 2025-05-17 ENCOUNTER — HOSPITAL ENCOUNTER (OUTPATIENT)
Dept: CT IMAGING | Age: 80
Discharge: HOME | End: 2025-05-17
Attending: UROLOGY
Payer: MEDICARE

## 2025-05-17 DIAGNOSIS — N20.0 RECURRENT KIDNEY STONES: ICD-10-CM

## 2025-05-17 PROCEDURE — 74176 CT ABD & PELVIS W/O CONTRAST: CPT | Performed by: UROLOGY

## 2025-05-19 NOTE — PROGRESS NOTES
CT SP 5/17/25: fullness in distal right ureter at UVJ - similar to 2023. Prior cysto indicated right reflux.

## 2025-05-20 ENCOUNTER — TELEPHONE (OUTPATIENT)
Dept: SURGERY | Facility: CLINIC | Age: 80
End: 2025-05-20

## 2025-05-20 RX ORDER — CEFDINIR 300 MG/1
300 CAPSULE ORAL EVERY 12 HOURS
Qty: 20 CAPSULE | Refills: 0 | Status: SHIPPED | OUTPATIENT
Start: 2025-05-20 | End: 2025-05-30

## 2025-05-20 NOTE — TELEPHONE ENCOUNTER
Please let patient know his UCx grew mult-drug resistant E coli but I suspect he has chronic colonization of the urinary tract and studies have shown that we should not treat with antibiotics in the absence of UTI symptoms as we are more likely to do harm than good as repeated use of antibiotics can lead to drug-resistant organisms and opportunistic infection such as C. difficile.  I did send antibiotics to his pharmacy (but the only acceptable antibiotic he has listed allergy as shortness of breath.  I will say that shortness of breath is usually not considered a true allergy so he could still consider taking this). But again would not recommend he take this unless he develops UTI symptoms.  In the meantime he should make sure to be drinking at least 2 L of water per day and enough to keep his urine clear to pale yellow.  If he has another symptomatic UTI then I would consider TURP for him which is what we discussed in the office.    Thanks,  MPH    Below if for Documentation Purposes Only:    [facility] UCx > 100K E coli S to cefdinir, zosyn, telma, ,gen, fosfo. R to e/e.

## 2025-05-20 NOTE — TELEPHONE ENCOUNTER
Spoke with pt and life partner, Adrienne and informed of below results/recommendations from Dr. Kaur  Pt will not start antibiotic at this time  Questions answered and pt/Adrienne verbalized understanding     This encounter is now closed

## 2025-05-21 NOTE — TELEPHONE ENCOUNTER
Called [facility], talked with pharmacist intern and informed of below. The pharmacist working on duty is Ngozi.   \"I did send antibiotics to his pharmacy (but the only acceptable antibiotic he has listed allergy as shortness of breath.  I will say that shortness of breath is usually not considered a true allergy so he could still consider taking this)\"    This encounter is now closed

## 2025-05-22 ENCOUNTER — TELEPHONE (OUTPATIENT)
Dept: NEUROLOGY | Facility: CLINIC | Age: 80
End: 2025-05-22

## 2025-05-23 NOTE — TELEPHONE ENCOUNTER
Life partner calling stating pt is been having castanon time walking   
Message relayed to patient's life partner, Adrienne; she expressed understanding.       Patient's life partner asked for Dr. Cespedes's recommendation to be mycharted to her for a reference of what to mention if they go to ER.     Message sent.     
Not sure this sound like nerve pain - if not able to stand, needs to go to ER to evaluated for possible musculoskeletal pathology   
S: Spoke with Adrienne and patient. Patient unable to stand and put weight on right leg. Patient c/o excruiating pain.     B: LOV 3/12/25. Prescribed 100 mg mary at 5pm and 9pm for restless legs. Had back surgery 1 year ago. Takes tramadol 50mg as needed for pain. Also on meloxicam.    A:Pain is in the right lower waist, outer hip/high thigh area. Not exactly muscular pain, nor nerve pain.     R: Suggested pain specialist referral, BUT patient also needs intervention now, questioning if it's worth coming for next scheduled appointment 6/11/25.    Secure message sent to provider to advise.     
(0) independent

## 2025-06-02 ENCOUNTER — APPOINTMENT (OUTPATIENT)
Age: 80
End: 2025-06-02
Attending: INTERNAL MEDICINE
Payer: MEDICARE

## 2025-06-18 DIAGNOSIS — M54.32 BILATERAL SCIATICA: ICD-10-CM

## 2025-06-18 DIAGNOSIS — M54.31 BILATERAL SCIATICA: ICD-10-CM

## 2025-06-18 RX ORDER — MELOXICAM 15 MG/1
15 TABLET ORAL DAILY
Qty: 90 TABLET | Refills: 1 | Status: SHIPPED | OUTPATIENT
Start: 2025-06-18

## 2025-06-30 ENCOUNTER — TELEPHONE (OUTPATIENT)
Dept: NEUROLOGY | Facility: CLINIC | Age: 80
End: 2025-06-30

## 2025-06-30 NOTE — TELEPHONE ENCOUNTER
Spoke with patient's life partner. Shared provider's recommendation to see PCP for evaluation and if deemed appropriate a referral can be made.     Patient's life partner expressed understanding and frustration.

## 2025-06-30 NOTE — TELEPHONE ENCOUNTER
I would recommend asking primary care provider - could be another source of pain besides lower back (ie hip, etc) - I did not refer to pain management and not comfortable making this referral as he was referred for restless legs syndrome.

## 2025-06-30 NOTE — TELEPHONE ENCOUNTER
Patient's life partner Adrienne called and states her life partner Skip  needs a referral to [facility]. Please return Adrienne's phone call at [phone]. Thank you.

## 2025-06-30 NOTE — TELEPHONE ENCOUNTER
Skip continues to have pain with his right leg. They would like to proceed with pain management; Adrienne reports that she has been in contact with [facility] but needs referral to consult.    Will confirm with Dr. Cespedes that referral is appropriate.

## 2025-07-01 ENCOUNTER — OFFICE VISIT (OUTPATIENT)
Dept: FAMILY MEDICINE CLINIC | Facility: CLINIC | Age: 80
End: 2025-07-01
Payer: MEDICARE

## 2025-07-01 VITALS
BODY MASS INDEX: 21.86 KG/M2 | WEIGHT: 161.38 LBS | SYSTOLIC BLOOD PRESSURE: 110 MMHG | HEART RATE: 75 BPM | HEIGHT: 72 IN | OXYGEN SATURATION: 95 % | RESPIRATION RATE: 14 BRPM | DIASTOLIC BLOOD PRESSURE: 60 MMHG

## 2025-07-01 DIAGNOSIS — J43.9 PULMONARY EMPHYSEMA, UNSPECIFIED EMPHYSEMA TYPE (HCC): ICD-10-CM

## 2025-07-01 DIAGNOSIS — M54.31 BILATERAL SCIATICA: ICD-10-CM

## 2025-07-01 DIAGNOSIS — N18.31 STAGE 3A CHRONIC KIDNEY DISEASE (HCC): ICD-10-CM

## 2025-07-01 DIAGNOSIS — N52.9 ED (ERECTILE DYSFUNCTION) OF ORGANIC ORIGIN: ICD-10-CM

## 2025-07-01 DIAGNOSIS — F41.9 ANXIETY: ICD-10-CM

## 2025-07-01 DIAGNOSIS — Z13.6 SCREENING FOR CARDIOVASCULAR CONDITION: ICD-10-CM

## 2025-07-01 DIAGNOSIS — M79.651 RIGHT THIGH PAIN: ICD-10-CM

## 2025-07-01 DIAGNOSIS — E78.00 HYPERCHOLESTEROLEMIA: ICD-10-CM

## 2025-07-01 DIAGNOSIS — N13.8 BPH WITH OBSTRUCTION/LOWER URINARY TRACT SYMPTOMS: ICD-10-CM

## 2025-07-01 DIAGNOSIS — M54.32 BILATERAL SCIATICA: ICD-10-CM

## 2025-07-01 DIAGNOSIS — Z00.00 ENCOUNTER FOR ANNUAL HEALTH EXAMINATION: Primary | ICD-10-CM

## 2025-07-01 DIAGNOSIS — N40.1 BPH WITH OBSTRUCTION/LOWER URINARY TRACT SYMPTOMS: ICD-10-CM

## 2025-07-01 RX ORDER — SILDENAFIL 100 MG/1
100 TABLET, FILM COATED ORAL AS NEEDED
Qty: 4 TABLET | Refills: 11 | Status: SHIPPED | OUTPATIENT
Start: 2025-07-01 | End: 2025-07-01

## 2025-07-01 RX ORDER — SERTRALINE HYDROCHLORIDE 100 MG/1
100 TABLET, FILM COATED ORAL DAILY
Qty: 90 TABLET | Refills: 1 | Status: SHIPPED | OUTPATIENT
Start: 2025-07-01 | End: 2025-07-01

## 2025-07-01 RX ORDER — SIMVASTATIN 20 MG
TABLET ORAL
Qty: 90 TABLET | Refills: 3 | Status: SHIPPED | OUTPATIENT
Start: 2025-07-01 | End: 2025-07-01

## 2025-07-01 RX ORDER — SILODOSIN 8 MG/1
8 CAPSULE ORAL DAILY
Qty: 90 CAPSULE | Refills: 5 | Status: SHIPPED | OUTPATIENT
Start: 2025-07-01 | End: 2025-07-01

## 2025-07-01 RX ORDER — SILDENAFIL 100 MG/1
100 TABLET, FILM COATED ORAL AS NEEDED
Qty: 4 TABLET | Refills: 11 | Status: SHIPPED | OUTPATIENT
Start: 2025-07-01

## 2025-07-01 RX ORDER — FINASTERIDE 5 MG/1
5 TABLET, FILM COATED ORAL DAILY
Qty: 90 TABLET | Refills: 5 | Status: SHIPPED | OUTPATIENT
Start: 2025-07-01 | End: 2025-07-01

## 2025-07-01 RX ORDER — SIMVASTATIN 20 MG
TABLET ORAL
Qty: 90 TABLET | Refills: 3 | Status: SHIPPED | OUTPATIENT
Start: 2025-07-01

## 2025-07-01 RX ORDER — MELOXICAM 15 MG/1
15 TABLET ORAL DAILY
Qty: 90 TABLET | Refills: 1 | Status: SHIPPED | OUTPATIENT
Start: 2025-07-01

## 2025-07-01 RX ORDER — FLUTICASONE FUROATE, UMECLIDINIUM BROMIDE AND VILANTEROL TRIFENATATE 200; 62.5; 25 UG/1; UG/1; UG/1
1 POWDER RESPIRATORY (INHALATION) DAILY
Qty: 3 EACH | Refills: 3 | Status: SHIPPED | OUTPATIENT
Start: 2025-07-01

## 2025-07-01 RX ORDER — SERTRALINE HYDROCHLORIDE 100 MG/1
100 TABLET, FILM COATED ORAL DAILY
Qty: 90 TABLET | Refills: 1 | Status: SHIPPED | OUTPATIENT
Start: 2025-07-01

## 2025-07-01 RX ORDER — SILODOSIN 8 MG/1
8 CAPSULE ORAL DAILY
Qty: 90 CAPSULE | Refills: 5 | Status: SHIPPED | OUTPATIENT
Start: 2025-07-01

## 2025-07-01 RX ORDER — FLUTICASONE FUROATE, UMECLIDINIUM BROMIDE AND VILANTEROL TRIFENATATE 200; 62.5; 25 UG/1; UG/1; UG/1
1 POWDER RESPIRATORY (INHALATION) DAILY
Qty: 3 EACH | Refills: 3 | Status: SHIPPED | OUTPATIENT
Start: 2025-07-01 | End: 2025-07-01

## 2025-07-01 RX ORDER — FINASTERIDE 5 MG/1
5 TABLET, FILM COATED ORAL DAILY
Qty: 90 TABLET | Refills: 5 | Status: SHIPPED | OUTPATIENT
Start: 2025-07-01

## 2025-07-01 NOTE — PROGRESS NOTES
Subjective:   Dev Johnston is a 80 year old male who presents for a MA AHA (Medicare Advantage Annual Health Assessment) and Subsequent Annual Wellness visit (Pt already had Initial Annual Wellness) and scheduled follow up of multiple significant but stable problems.             Right leg, ongoing issue. Had an xray that was normal.    Has been evaluated, doesn't appear to be spinal issues, xr of hip and leg    If he stands on it, relies on left leg, right will give out. Goes away when sleeping or sitting.    Has had an infusion of iron. Is worried he needs another one.    Life is stable.        History/Other:   Fall Risk Assessment:   He has been screened for Falls and is High Risk. Fall Prevention information provided to patient in After Visit Summary.    Do you feel unsteady when standing or walking?: (Patient-Rptd) No  Do you worry about falling?: (Patient-Rptd) Yes  Have you fallen in the past year?: (Patient-Rptd) Yes  Were you injured?: (Patient-Rptd) (P) Yes     Cognitive Assessment:   He had a completely normal cognitive assessment - see flowsheet entries     Functional Ability/Status:   Dev Johnston has some abnormal functions as listed below:  He has Walking problems based on screening of functional status.       Depression Screening (PHQ):  PHQ-2 SCORE: 0  , done 6/30/2025        Advanced Directives:   He does have a Living Will but we do NOT have it on file in Epic.    He does have a POA but we do NOT have it on file in Epic.    Discussed Advance Care Planning with patient (and family/surrogate if present). Standard forms made available to patient in After Visit Summary.      Patient Active Problem List   Diagnosis    Benign essential HTN    Hypercholesterolemia    ED (erectile dysfunction) of organic origin    Carotid artery disease    Aorto-iliac atherosclerosis    Aortic ectasia    Heart murmur, systolic    Macrocytosis without anemia    Open angle with borderline findings and  high glaucoma risk in both eyes    Seborrheic keratosis    Pulmonary emphysema (HCC)    Anxiety    Myoclonic jerking    Diaphragm paralysis    CKD (chronic kidney disease) stage 3, GFR 30-59 ml/min (HCC)    Thrombocytopenia    Iron deficiency anemia secondary to blood loss (chronic)    Bacteriuria, asymptomatic     Allergies:  He is allergic to cephalexin.    Current Medications:  Outpatient Medications Marked as Taking for the 7/1/25 encounter (Office Visit) with Tim Mujica MD   Medication Sig    finasteride 5 MG Oral Tab Take 1 tablet (5 mg total) by mouth daily.    sertraline 100 MG Oral Tab Take 1 tablet (100 mg total) by mouth daily.    Sildenafil Citrate 100 MG Oral Tab Take 1 tablet (100 mg total) by mouth as needed for Erectile Dysfunction.    silodosin (RAPAFLO) 8 MG Oral Cap Take 1 capsule (8 mg total) by mouth daily.    simvastatin 20 MG Oral Tab TAKE 1 TABLET BY MOUTH EVERY NIGHT AT BEDTIME    TRELEGY ELLIPTA 200-62.5-25 MCG/ACT Inhalation Aerosol Powder, Breath Activated Inhale 1 puff into the lungs daily.    Meloxicam 15 MG Oral Tab Take 1 tablet (15 mg total) by mouth daily.       Medical History:  He  has a past medical history of Adverse drug reaction (12/10/2014), Age-related cataract of both eyes (08/04/2021), Anxiety (2011), Arthritis (2010), Asthma (HCC), Atherosclerosis of coronary artery (2011), Back pain (2010), Back problem, Benign neoplasm of colon, BPH (benign prostatic hyperplasia) (07/29/2013), Calculus of kidney (?), COPD (chronic obstructive pulmonary disease) (HCC), Coronary atherosclerosis, Depression (2011), Diverticulosis of colon (without mention of hemorrhage), Easy bruising (2010), Esophageal reflux, Flatulence/gas pain/belching (occasional), Frequent urination (2010), Frequent use of laxatives (occasional), Heartburn (1998?), Hemorrhoids (?), High blood pressure, High cholesterol, History of cardiac murmur (?), Prostate nodule (07/27/2016), Stented coronary artery (?),  Unspecified hemorrhoids without mention of complication (06/29/2012), Visual impairment, Wears glasses (2020), and Wheezing (2010).  Surgical History:  He  has a past surgical history that includes colonoscopy (11/2012); insert intracoronary stent; tonsillectomy; upper gi endoscopy - referral (03/22/2012); cataract (Bilateral, 11/2021); other surgical history (02/02/2022); angioplasty (coronary) (05/2011); appendectomy (1949); other surgical history (01/29/2024); colonoscopy (Nov 2012); and appendectomy (1949).   Family History:  His family history includes Breast Cancer in his sister; CABG in his father; Cancer in his mother; Heart Disorder in his father; esophogeal reflux in his mother; hip replacement in his mother.  Social History:  He  reports that he quit smoking about 14 years ago. His smoking use included cigarettes. He started smoking about 57 years ago. He has a 43.3 pack-year smoking history. He has never used smokeless tobacco. He reports that he does not currently use alcohol. He reports that he does not currently use drugs.    Tobacco:  He smoked tobacco in the past but quit greater than 12 months ago.  Tobacco Use[1]     CAGE Alcohol Screen:   CAGE screening score of 0 on 6/30/2025, showing low risk of alcohol abuse.      Patient Care Team:  Tim Mujica MD as PCP - General (Family Medicine)  Jacquelyn Mojica, PT as Physical Therapist (Physical Therapy)  Lena Spring APRN (Nurse Practitioner)  Salvador Spicer MD (NEUROSURGERY)  Jose Hyde MD (GASTROENTEROLOGY)  Carmela Olmos MD (UROLOGY)  Bren Woodard MD as Referring Physician (INFECTIOUS DISEASES)  Salvador Polo PT (Physical Therapy)  Hitesh Avery MD (Interventional, Cardiology)  Dilcia Pressley APN (Nurse Practitioner Family)  Chantal Charlton PT as PT Student (Physical Therapy)    Review of Systems  Negative except as noted in HPI, holds back on exercise due to pain in leg. Mohs planned on ear. Not sure sertraline  is working as wll.    Objective:   Physical Exam  General Appearance:  Alert, cooperative, no distress, appears stated age   Head:  Normocephalic, without obvious abnormality, atraumatic   Eyes:  PERRL, conjunctiva/corneas clear, EOM's intact, both eyes   Ears:  Normal TM's and external ear canals, both ears   Nose: Nares normal, septum midline, mucosa normal, no drainage or sinus tenderness   Throat: Lips, mucosa, and tongue normal; teeth and gums normal   Neck: Supple, symmetrical, trachea midline, no adenopathy, thyroid: not enlarged, symmetric, no tenderness/mass/nodules, no carotid bruit or JVD   Back:   Symmetric, no curvature, ROM normal, no CVA tenderness   Lungs:   Clear to auscultation bilaterally, respirations unlabored   Chest Wall:  No tenderness or deformity   Heart:  Regular rate and rhythm, S1, S2 normal, no murmur, rub or gallop   Abdomen:   Soft, non-tender, bowel sounds active all four quadrants,  no masses, no organomegaly           Extremities: Extremities normal, atraumatic, no cyanosis or edema   Pulses: 2+ and symmetric   Skin: Skin color, texture, turgor normal, no rashes or lesions   Lymph nodes: Cervical, supraclavicular, and axillary nodes normal   Neurologic: Normal     /60   Pulse 75   Resp 14   Ht 6' (1.829 m)   Wt 161 lb 6.4 oz (73.2 kg)   SpO2 95%   BMI 21.89 kg/m²  Estimated body mass index is 21.89 kg/m² as calculated from the following:    Height as of this encounter: 6' (1.829 m).    Weight as of this encounter: 161 lb 6.4 oz (73.2 kg).    Medicare Hearing Assessment:   Hearing Screening    Screening Method: Finger Rub  Finger Rub Result: Pass         Visual Acuity:   Right Eye Visual Acuity: Uncorrected Right Eye Chart Acuity: 20/40   Left Eye Visual Acuity: Uncorrected Left Eye Chart Acuity: 20/25   Both Eyes Visual Acuity: Uncorrected Both Eyes Chart Acuity: 20/25   Able To Tolerate Visual Acuity: Yes        Assessment & Plan:   Dev Johnston is a 80 year  old male who presents for a Medicare Assessment.     1. Encounter for annual health examination (Primary)  -     Lipid Panel  2. Screening for cardiovascular condition  -     Lipid Panel  3. Right thigh pain - Ongoing, not solved, he notes he has hadhip xray done, but I can't find record. States was normal.   -     Cancel: Physiatry Referral - In Network  -     Physiatry Referral - In Network  4. Pulmonary emphysema, unspecified emphysema type (HCC) - Stable, will continue medication  -     Discontinue: Trelegy Ellipta; Inhale 1 puff into the lungs daily.  Dispense: 3 each; Refill: 3  -     Trelegy Ellipta; Inhale 1 puff into the lungs daily.  Dispense: 3 each; Refill: 3  5. Hypercholesterolemia - Stable, continue current medication  -     Discontinue: Simvastatin; TAKE 1 TABLET BY MOUTH EVERY NIGHT AT BEDTIME  Dispense: 90 tablet; Refill: 3  -     Simvastatin; TAKE 1 TABLET BY MOUTH EVERY NIGHT AT BEDTIME  Dispense: 90 tablet; Refill: 3  6. ED (erectile dysfunction) of organic origin  -     Discontinue: Sildenafil Citrate; Take 1 tablet (100 mg total) by mouth as needed for Erectile Dysfunction.  Dispense: 4 tablet; Refill: 11  -     Sildenafil Citrate; Take 1 tablet (100 mg total) by mouth as needed for Erectile Dysfunction.  Dispense: 4 tablet; Refill: 11  7. Anxiety - Controlled  -     Discontinue: Sertraline HCl; Take 1 tablet (100 mg total) by mouth daily.  Dispense: 90 tablet; Refill: 1  -     Sertraline HCl; Take 1 tablet (100 mg total) by mouth daily.  Dispense: 90 tablet; Refill: 1  8. BPH with obstruction/lower urinary tract symptoms - Will refill medication, follows with urology  -     Discontinue: Silodosin; Take 1 capsule (8 mg total) by mouth daily.  Dispense: 90 capsule; Refill: 5  -     Discontinue: Finasteride; Take 1 tablet (5 mg total) by mouth daily.  Dispense: 90 tablet; Refill: 5  -     Finasteride; Take 1 tablet (5 mg total) by mouth daily.  Dispense: 90 tablet; Refill: 5  -     Silodosin;  Take 1 capsule (8 mg total) by mouth daily.  Dispense: 90 capsule; Refill: 5  9. Stage 3a chronic kidney disease (HCC) - Stable  10. Bilateral sciatica - Not sure pain is truly sciatica but will continue medication, kidney function has been stable  -     Meloxicam; Take 1 tablet (15 mg total) by mouth daily.  Dispense: 90 tablet; Refill: 1            The patient indicates understanding of these issues and agrees to the plan.  Lab work ordered.  Reinforced healthy diet, lifestyle, and exercise.      No follow-ups on file.     Tim Mujica MD, 7/1/2025     Supplementary Documentation:   General Health:  In the past six months, have you lost more than 10 pounds without trying?: (Patient-Rptd) 2 - No  Has your appetite been poor?: (Patient-Rptd) No  Type of Diet: (Patient-Rptd) Balanced  How does the patient maintain a good energy level?: (Patient-Rptd) Appropriate Exercise  How would you describe your daily physical activity?: (Patient-Rptd) Moderate  How would you describe your current health state?: (Patient-Rptd) Fair  How do you maintain positive mental well-being?: (Patient-Rptd) Social Interaction, Visiting Friends  On a scale of 0 to 10, with 0 being no pain and 10 being severe pain, what is your pain level?: (Patient-Rptd) 3 - (Mild)  In the past six months, have you experienced urine leakage?: 0-No  At any time do you feel concerned for the safety/well-being of yourself and/or your children, in your home or elsewhere?: No  Have you had any immunizations at another office such as Influenza, Hepatitis B, Tetanus, or Pneumococcal?: Yes    Health Maintenance   Topic Date Due    Pneumococcal Vaccine: 50+ Years (3 of 3 - PCV20 or PCV21) 07/25/2021    COVID-19 Vaccine (7 - 2024-25 season) 09/01/2024    Annual Well Visit  01/01/2025    Annual Depression Screening  01/01/2025    Influenza Vaccine (1) 10/01/2025    Fall Risk Screening (Annual)  Completed    Zoster Vaccines  Completed    Meningococcal B Vaccine  Aged  Out          [1]   Social History  Tobacco Use   Smoking Status Former    Current packs/day: 0.00    Average packs/day: 1 pack/day for 43.3 years (43.3 ttl pk-yrs)    Types: Cigarettes    Start date: 1968    Quit date: 2011    Years since quittin.1   Smokeless Tobacco Never

## 2025-07-11 ENCOUNTER — OFFICE VISIT (OUTPATIENT)
Age: 80
End: 2025-07-11
Attending: INTERNAL MEDICINE
Payer: MEDICARE

## 2025-07-11 ENCOUNTER — NURSE ONLY (OUTPATIENT)
Age: 80
End: 2025-07-11
Attending: INTERNAL MEDICINE
Payer: MEDICARE

## 2025-07-11 VITALS
SYSTOLIC BLOOD PRESSURE: 144 MMHG | RESPIRATION RATE: 16 BRPM | OXYGEN SATURATION: 94 % | WEIGHT: 163 LBS | HEIGHT: 72.01 IN | HEART RATE: 65 BPM | TEMPERATURE: 98 F | BODY MASS INDEX: 22.08 KG/M2 | DIASTOLIC BLOOD PRESSURE: 81 MMHG

## 2025-07-11 DIAGNOSIS — D50.0 IRON DEFICIENCY ANEMIA SECONDARY TO BLOOD LOSS (CHRONIC): Primary | ICD-10-CM

## 2025-07-11 DIAGNOSIS — D50.0 IRON DEFICIENCY ANEMIA SECONDARY TO BLOOD LOSS (CHRONIC): ICD-10-CM

## 2025-07-11 DIAGNOSIS — M79.604 RIGHT LEG PAIN: ICD-10-CM

## 2025-07-11 LAB
BASOPHILS # BLD AUTO: 0.04 X10(3) UL (ref 0–0.2)
BASOPHILS NFR BLD AUTO: 0.9 %
DEPRECATED HBV CORE AB SER IA-ACNC: 255 NG/ML (ref 50–336)
EOSINOPHIL # BLD AUTO: 0.32 X10(3) UL (ref 0–0.7)
EOSINOPHIL NFR BLD AUTO: 6.9 %
ERYTHROCYTE [DISTWIDTH] IN BLOOD BY AUTOMATED COUNT: 16 %
HCT VFR BLD AUTO: 37.7 % (ref 39–53)
HGB BLD-MCNC: 12.9 G/DL (ref 13–17.5)
IMM GRANULOCYTES # BLD AUTO: 0.02 X10(3) UL (ref 0–1)
IMM GRANULOCYTES NFR BLD: 0.4 %
LYMPHOCYTES # BLD AUTO: 0.67 X10(3) UL (ref 1–4)
LYMPHOCYTES NFR BLD AUTO: 14.3 %
MCH RBC QN AUTO: 31.8 PG (ref 26–34)
MCHC RBC AUTO-ENTMCNC: 34.2 G/DL (ref 31–37)
MCV RBC AUTO: 92.9 FL (ref 80–100)
MONOCYTES # BLD AUTO: 0.52 X10(3) UL (ref 0.1–1)
MONOCYTES NFR BLD AUTO: 11.1 %
NEUTROPHILS # BLD AUTO: 3.1 X10 (3) UL (ref 1.5–7.7)
NEUTROPHILS # BLD AUTO: 3.1 X10(3) UL (ref 1.5–7.7)
NEUTROPHILS NFR BLD AUTO: 66.4 %
PLATELET # BLD AUTO: 144 10(3)UL (ref 150–450)
RBC # BLD AUTO: 4.06 X10(6)UL (ref 3.8–5.8)
WBC # BLD AUTO: 4.7 X10(3) UL (ref 4–11)

## 2025-07-11 NOTE — PROGRESS NOTES
Cancer Center Progress Note    Problem List:      Problem List[1]      History of Present Illness  Skip Johnston is an 80 year old male with anemia who presents with persistent right thigh pain. He was referred by Dr. Lara for evaluation of persistent right thigh pain.    He has been experiencing persistent pain in his right thigh, extending to the hip, for over a year, with recent worsening. The pain makes it very difficult to walk, is absent during sleep, but intensifies upon walking.    He received a 1000 mg infusion of Infed on May 13, 2025, following hemoglobin levels of 9.0 g/dL on March 17, 9.4 g/dL on March 25, 10.6 g/dL on April 1, and 11.3 g/dL on April 15. His ferritin level was 11 ng/mL on March 13. Post-infusion, his hemoglobin increased to 12.9 g/dL and ferritin to 255 ng/mL. Despite initial improvement, he now feels he is 'sinking again'.    No recent urinary infections, with the last one occurring over a year ago. Bowel movements are normal. A CT scan of the abdomen and pelvis was performed on May 17, 2025. An x-ray of the right femur was performed on May 6, 2025.    He underwent a colonoscopy and endoscopy on May 27, 2025, and was told that hemorrhoids and mild gastritis were found. He is not on blood thinners but takes a daily baby aspirin.    He has been evaluated by a neurologist, who did not find a neurological cause for the pain. He is scheduled to see a musculoskeletal specialist at the end of the month.      Review of Systems:   Constitutional: Negative for anorexia, fevers, chills, night sweats and weight loss.  Eyes: Negative for visual disturbance, irritation and redness.  Cardiovascular: Negative for angina, orthopnea or palpitations.  Gastrointestinal: Negative for nausea, vomiting, change in bowel habits, diarrhea, constipation and abdominal pain.  Integument/breast: Negative for rash, skin lesions, and pruritus.  Hematologic/lymphatic: Negative for easy bruising, bleeding,  and lymphadenopathy.  Psychiatric: The patient's mood was calm and appropriate for this visit.  The pertinent positives and negatives were described. All other systems were negative.    PMH/PSH:  Past Medical History[2]    Past Surgical History[3]    Family History Reviewed:  Family History[4]    Allergies:     Allergies[5]    Medications:  Current Medications[6]       Vital Signs:      Height: 182.9 cm (6' 0.01\") (07/11 1027)  Weight: 73.9 kg (163 lb) (07/11 1027)  BSA (Calculated - sq m): 1.95 sq meters (07/11 1027)  Pulse: 65 (07/11 1027)  BP: 144/81 (07/11 1027)  Temp: 97.8 °F (36.6 °C) (07/11 1027)  Do Not Use - Resp Rate: --  SpO2: 94 % (07/11 1027)        Physical Examination:      Constitutional: Patient is alert and oriented x 3, not in acute distress.  Respiratory: Clear to auscultation and percussion. No rales.  No wheezes.  Cardiovascular: Regular rate and rhythm. No murmurs.  Gastrointestinal: Soft, non tender with good bowel sounds.  Musculoskeletal: No edema. No calf tenderness.  Lymphatics: There is no palpable lymphadenopathy throughout in the cervical, supraclavicular, or axillary regions.  Psychiatric: The patient's mood is calm and appropriate for this visit.           Results reviewed at this visit:     Lab Results   Component Value Date    WBC 4.7 07/11/2025    RBC 4.06 07/11/2025    HGB 12.9 (L) 07/11/2025    HCT 37.7 (L) 07/11/2025    MCV 92.9 07/11/2025    MCH 31.8 07/11/2025    MCHC 34.2 07/11/2025    RDW 16.0 07/11/2025    .0 (L) 07/11/2025    MPV 9.8 05/27/2011     Lab Results   Component Value Date     05/05/2025    K 4.8 05/05/2025     05/05/2025    CO2 29.0 05/05/2025    BUN 31 (H) 05/05/2025    CREATSERUM 1.35 (H) 05/05/2025    GLU 96 05/05/2025    CA 9.3 05/05/2025    ALKPHO 84 05/05/2025    ALT 16 05/05/2025    AST 27 05/05/2025    BILT 0.2 05/05/2025    ALB 4.4 05/05/2025    TP 7.4 05/05/2025       Results  LABS  Hb: 9 (03/17/2025)  Hb: 9.4 (03/25/2025)  Hb: 10.6  (04/01/2025)  Hb: 11.3 (04/15/2025)  Ferritin: 11 (03/13/2025)  Ferritin: 255 (07/11/2025)  Hb: 12.9 (07/11/2025)    RADIOLOGY  CT abdomen and pelvis: No kidney stone, no significant findings (05/17/2025)  X-ray right femur: Normal (05/06/2025)    DIAGNOSTIC  Colonoscopy: Hemorrhoids, mild gastritis, no concerning findings for bleeding (05/27/2025)         Assessment & Plan    Iron deficiency anemia  Previously treated with 1000 mg of Infed on May 13. Hemoglobin improved from 9 in March to 12.9 currently, slightly below normal range of 13. Ferritin increased from 11 to 255. Current hemoglobin level is not low enough to cause symptoms. No evidence of bleeding in recent colonoscopy and endoscopy. No current evidence of low iron levels. Anemia is not the cause of right thigh pain. Cause of previous low iron levels remains undetermined.  - Monitor hemoglobin and iron levels over time.  - Repeat labs in three months to assess hemoglobin and iron levels.    Right thigh pain  Chronic right thigh pain, worsening over time, impeding ambulation. Pain is intermittent and absent during sleep. Previous imaging (CT of abdomen and pelvis, x-ray of right femur) showed no abnormalities. Neurologist excluded neuropathic pain. Suspected musculoskeletal origin, possibly arthritis or other musculoskeletal issues. Iron deficiency anemia is not the cause.  - Refer to orthopedic specialist for further evaluation.  - Proceed with scheduled appointment with pain management specialist, Dr. Haddad, at the end of the month.  - Consider MRI of the leg if recommended by specialists.           The following individual(s) verbally consented to be recorded using ambient AI listening technology and understand that they can each withdraw their consent to this listening technology at any point by asking the clinician to turn off or pause the recording:    Patient name: Dev Johnston  Additional names:  Adrienne Jacobo,  MD          [1]   Patient Active Problem List  Diagnosis    Benign essential HTN    Hypercholesterolemia    ED (erectile dysfunction) of organic origin    Carotid artery disease    Aorto-iliac atherosclerosis    Aortic ectasia    Heart murmur, systolic    Macrocytosis without anemia    Open angle with borderline findings and high glaucoma risk in both eyes    Seborrheic keratosis    Pulmonary emphysema (HCC)    Anxiety    Myoclonic jerking    Diaphragm paralysis    CKD (chronic kidney disease) stage 3, GFR 30-59 ml/min (HCC)    Thrombocytopenia    Iron deficiency anemia secondary to blood loss (chronic)    Bacteriuria, asymptomatic   [2]   Past Medical History:   Adverse drug reaction    Age-related cataract of both eyes    Anxiety    Arthritis    Asthma (HCC)    Atherosclerosis of coronary artery    Back pain    Back problem    Benign neoplasm of colon    BPH (benign prostatic hyperplasia)    Calculus of kidney    COPD (chronic obstructive pulmonary disease) (HCC)    NO HOME O2    Coronary atherosclerosis    Depression    Diverticulosis of colon (without mention of hemorrhage)    Easy bruising    Esophageal reflux    Flatulence/gas pain/belching    Frequent urination    Frequent use of laxatives    Heartburn    Hemorrhoids    High blood pressure    High cholesterol    History of cardiac murmur    Prostate nodule    Had biopsy 2016    Stented coronary artery    one    Unspecified hemorrhoids without mention of complication    Visual impairment    READING GLASSES    Wears glasses    Wheezing   [3]   Past Surgical History:  Procedure Laterality Date    Angioplasty (coronary)  05/2011    Appendectomy  1949    Appendectomy  1949    Cataract Bilateral 11/2021    cataract removed    Colonoscopy  11/2012    + polyps    Colonoscopy  Nov 2012    Insert intracoronary stent      Other surgical history  02/02/2022    Cystoscopy Dr. Olmos     Other surgical history  01/29/2024    LUMBAR 3 AND LUMBAR 4 LAMINECTOMIES WITH MEDIAL  FACETECTOMIES    Tonsillectomy      Upper gi endoscopy - referral  03/22/2012    esophagitis, repeat in 5 year   [4]   Family History  Problem Relation Age of Onset    Breast Cancer Sister     Other (CABG) Father         x4    Heart Disorder Father     Other (esophogeal reflux) Mother     Other (hip replacement) Mother     Cancer Mother    [5]   Allergies  Allergen Reactions    Cephalexin DIZZINESS and SHORTNESS OF BREATH   [6]    finasteride 5 MG Oral Tab Take 1 tablet (5 mg total) by mouth daily. 90 tablet 5    sertraline 100 MG Oral Tab Take 1 tablet (100 mg total) by mouth daily. 90 tablet 1    Sildenafil Citrate 100 MG Oral Tab Take 1 tablet (100 mg total) by mouth as needed for Erectile Dysfunction. 4 tablet 11    silodosin (RAPAFLO) 8 MG Oral Cap Take 1 capsule (8 mg total) by mouth daily. 90 capsule 5    simvastatin 20 MG Oral Tab TAKE 1 TABLET BY MOUTH EVERY NIGHT AT BEDTIME 90 tablet 3    TRELEGY ELLIPTA 200-62.5-25 MCG/ACT Inhalation Aerosol Powder, Breath Activated Inhale 1 puff into the lungs daily. 3 each 3    Meloxicam 15 MG Oral Tab Take 1 tablet (15 mg total) by mouth daily. 90 tablet 1    Omeprazole 20 MG Oral Tab EC       albuterol 108 (90 Base) MCG/ACT Inhalation Aero Soln Inhale 2 puffs into the lungs every 6 (six) hours as needed for Wheezing. 1 each 3    aspirin (EQ ASPIRIN ADULT LOW DOSE) 81 MG Oral Tab EC

## 2025-07-11 NOTE — PROGRESS NOTES
Patient here for f/u for iron deficiency anemia. Patient had infed infusion in May of 2025. Patient completed labs yesterday with a ferritin of 255. Patient states he still has right leg pain, fatigue. Patient denies dizziness, chest pain, cough or dyspnea. Patient is accompanied by his wife Adrienne.    Education Record    Learner:  Patient and Spouse wife Adrienne    Disease / Diagnosis: iron deficiency anemia     Barriers / Limitations:  None   Comments:    Method:  Discussion   Comments:    General Topics:  Medication, Side effects and symptom management, and Plan of care reviewed   Comments:    Outcome:  Shows understanding   Comments:

## 2025-07-31 ENCOUNTER — TELEPHONE (OUTPATIENT)
Dept: PHYSICAL MEDICINE AND REHAB | Facility: CLINIC | Age: 80
End: 2025-07-31

## 2025-07-31 ENCOUNTER — OFFICE VISIT (OUTPATIENT)
Dept: PHYSICAL MEDICINE AND REHAB | Facility: CLINIC | Age: 80
End: 2025-07-31
Payer: MEDICARE

## 2025-07-31 ENCOUNTER — HOSPITAL ENCOUNTER (OUTPATIENT)
Dept: GENERAL RADIOLOGY | Age: 80
Discharge: HOME | End: 2025-07-31
Attending: PHYSICAL MEDICINE & REHABILITATION

## 2025-07-31 VITALS — BODY MASS INDEX: 22.08 KG/M2 | WEIGHT: 163 LBS | HEIGHT: 72.01 IN

## 2025-07-31 DIAGNOSIS — M54.16 LUMBAR RADICULOPATHY: Primary | ICD-10-CM

## 2025-07-31 DIAGNOSIS — M54.16 LUMBAR RADICULOPATHY: ICD-10-CM

## 2025-07-31 PROCEDURE — 72110 X-RAY EXAM L-2 SPINE 4/>VWS: CPT | Performed by: PHYSICAL MEDICINE & REHABILITATION

## 2025-07-31 PROCEDURE — 99204 OFFICE O/P NEW MOD 45 MIN: CPT | Performed by: PHYSICAL MEDICINE & REHABILITATION

## 2025-07-31 PROCEDURE — 1159F MED LIST DOCD IN RCRD: CPT | Performed by: PHYSICAL MEDICINE & REHABILITATION

## 2025-07-31 PROCEDURE — 1160F RVW MEDS BY RX/DR IN RCRD: CPT | Performed by: PHYSICAL MEDICINE & REHABILITATION

## 2025-07-31 RX ORDER — PREGABALIN 100 MG/1
100 CAPSULE ORAL 2 TIMES DAILY
Qty: 60 CAPSULE | Refills: 0 | Status: SHIPPED | OUTPATIENT
Start: 2025-07-31

## 2025-08-05 ENCOUNTER — TELEPHONE (OUTPATIENT)
Dept: FAMILY MEDICINE CLINIC | Facility: CLINIC | Age: 80
End: 2025-08-05

## 2025-08-06 ENCOUNTER — MED REC SCAN ONLY (OUTPATIENT)
Dept: PHYSICAL MEDICINE AND REHAB | Facility: CLINIC | Age: 80
End: 2025-08-06

## 2025-08-08 ENCOUNTER — TELEPHONE (OUTPATIENT)
Dept: PHYSICAL MEDICINE AND REHAB | Facility: CLINIC | Age: 80
End: 2025-08-08

## 2025-08-11 ENCOUNTER — NURSE ONLY (OUTPATIENT)
Dept: FAMILY MEDICINE CLINIC | Facility: CLINIC | Age: 80
End: 2025-08-11

## 2025-08-11 PROCEDURE — 90677 PCV20 VACCINE IM: CPT | Performed by: FAMILY MEDICINE

## 2025-08-11 PROCEDURE — G0009 ADMIN PNEUMOCOCCAL VACCINE: HCPCS | Performed by: FAMILY MEDICINE

## 2025-08-18 ENCOUNTER — DOCUMENTATION ONLY (OUTPATIENT)
Dept: PHYSICAL MEDICINE AND REHAB | Facility: CLINIC | Age: 80
End: 2025-08-18

## 2025-08-28 ENCOUNTER — PROCEDURE VISIT (OUTPATIENT)
Dept: PHYSICAL MEDICINE AND REHAB | Facility: CLINIC | Age: 80
End: 2025-08-28

## 2025-08-31 ENCOUNTER — APPOINTMENT (OUTPATIENT)
Dept: GENERAL RADIOLOGY | Age: 80
End: 2025-08-31

## 2025-08-31 ENCOUNTER — HOSPITAL ENCOUNTER (EMERGENCY)
Age: 80
Discharge: HOME | End: 2025-08-31

## 2025-08-31 VITALS
HEIGHT: 72 IN | OXYGEN SATURATION: 95 % | WEIGHT: 160 LBS | HEART RATE: 71 BPM | RESPIRATION RATE: 14 BRPM | BODY MASS INDEX: 21.67 KG/M2 | DIASTOLIC BLOOD PRESSURE: 69 MMHG | SYSTOLIC BLOOD PRESSURE: 117 MMHG | TEMPERATURE: 98 F

## 2025-08-31 DIAGNOSIS — S80.01XA CONTUSION OF RIGHT KNEE, INITIAL ENCOUNTER: Primary | ICD-10-CM
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
## 2025-08-31 PROCEDURE — 73562 X-RAY EXAM OF KNEE 3: CPT

## (undated) DIAGNOSIS — M47.816 LUMBAR SPONDYLOSIS: Primary | ICD-10-CM

## (undated) DIAGNOSIS — M46.1 SACROILIITIS (HCC): Primary | ICD-10-CM

## (undated) DIAGNOSIS — N40.1 BENIGN PROSTATIC HYPERPLASIA WITH LOWER URINARY TRACT SYMPTOMS: ICD-10-CM

## (undated) DIAGNOSIS — K21.9 GASTROESOPHAGEAL REFLUX DISEASE, UNSPECIFIED WHETHER ESOPHAGITIS PRESENT: Primary | ICD-10-CM

## (undated) DEVICE — REMOVER PREP SOLUTION 4OZ

## (undated) DEVICE — BANDAGE ADH COVERLET 3X1IN

## (undated) DEVICE — SINGLE USE SUCTION VALVE MAJ-209: Brand: SINGLE USE SUCTION VALVE (STERILE)

## (undated) DEVICE — PAIN TRAY: Brand: MEDLINE INDUSTRIES, INC.

## (undated) DEVICE — REMOVER DURAPREP 3M

## (undated) DEVICE — MARKER SKIN PREP RESIST STRL

## (undated) DEVICE — Device: Brand: INTELLICART™

## (undated) DEVICE — AVANOS* TUOHY EPIDURAL NEEDLE: Brand: AVANOS

## (undated) DEVICE — 10FT COMBINED O2 DELIVERY/CO2 MONITORING. FILTER WITH MICROSTREAM TYPE LUER: Brand: DUAL ADULT NASAL CANNULA

## (undated) DEVICE — CONMED MULTI-PURPOSE SHEATHED CYTOLOGY BRUSH, RING HANDLE, 1.7 MM X 160 CM: Brand: CONMED

## (undated) DEVICE — FORCEPS BX 100CM 1.8MM RJ STD

## (undated) DEVICE — GLOVE SURG SENSICARE SZ 7

## (undated) DEVICE — GOWN SURG AERO CHROME XXL

## (undated) DEVICE — GLOVE SURG SENSICARE SZ 7-1/2

## (undated) DEVICE — MARKER SURGICAL DUAL TIP

## (undated) DEVICE — SOLUTION IRRIG 1000ML 0.9% NACL USP BTL

## (undated) DEVICE — 1200CC GUARDIAN II: Brand: GUARDIAN

## (undated) DEVICE — ENDOSCOPY PACK - LOWER: Brand: MEDLINE INDUSTRIES, INC.

## (undated) DEVICE — 3.0MM PRECISION NEURO (MATCH HEAD)

## (undated) DEVICE — DRAPE,LAPAROTOMY,PCH,STERILE: Brand: MEDLINE

## (undated) DEVICE — SUTURE VCRL SZ 2-0 L18IN ABSRB VLT L26MM CT-2

## (undated) DEVICE — DRAPE,TOWEL,LARGE,INVISISHIELD: Brand: MEDLINE

## (undated) DEVICE — SYRINGE MED 10ML SLIP TIP CLR BRL TAPR PLUNG

## (undated) DEVICE — MEDI-VAC NON-CONDUCTIVE SUCTION TUBING: Brand: CARDINAL HEALTH

## (undated) DEVICE — LIGHT HANDLE

## (undated) DEVICE — Device

## (undated) DEVICE — GLOVE SUR 6.5 SENSICARE PIP WHT PWD F

## (undated) DEVICE — MASK,FACE,MAXFLUIDPROTECT,SHIELD/TIES: Brand: MEDLINE

## (undated) DEVICE — 60 ML SYRINGE REGULAR TIP: Brand: MONOJECT

## (undated) DEVICE — GLOVE SURG SENSICARE SZ 6-1/2

## (undated) DEVICE — SINGLE USE BIOPSY VALVE MAJ-210: Brand: SINGLE USE BIOPSY VALVE (STERILE)

## (undated) DEVICE — 3M™ RED DOT™ MONITORING ELECTRODE WITH FOAM TAPE AND STICKY GEL, 50/BAG, 20/CASE, 72/PLT 2570: Brand: RED DOT™

## (undated) DEVICE — C-ARM: Brand: UNBRANDED

## (undated) DEVICE — CATHETER IV 14GA L5.25IN ANGIOCATH STR FEP

## (undated) DEVICE — SYRINGE 10ML LL CONTRL SYRINGE

## (undated) DEVICE — BANDAID COVERLET 1X3

## (undated) DEVICE — NEEDLE SPINAL 22X3-1/2 BLK

## (undated) DEVICE — SUTURE VCRL SZ 0 L18IN ABSRB VLT L36MM CT-1

## (undated) DEVICE — SPONGE GZ 4XL4IN 100% COT 12 PLY TYP VII WVN

## (undated) DEVICE — SLEEVE COMPR M KNEE LEN SGL USE KENDALL SCD

## (undated) DEVICE — LAMINECTOMY CDS: Brand: MEDLINE INDUSTRIES, INC.

## (undated) DEVICE — ADHESIVE SKIN TOP FOR WND CLSR DERMBND ADV

## (undated) DEVICE — SOLUTION RUBBING 4OZ 70% ISO ALC CLR

## (undated) DEVICE — STERILE SYNTHETIC POLYISOPRENE POWDER-FREE SURGICAL GLOVES WITH HYDROGEL COATING: Brand: PROTEXIS

## (undated) DEVICE — BITEBLOCK ENDOSCP 60FR MAXI STRP

## (undated) DEVICE — SUTURE VCRL SZ 3-0 L18IN ABSRB VLT L17MM RB-1

## (undated) NOTE — Clinical Note
I would like to get a follow up MRI for L2-3 edema and roll ou any progressing.  I ordered MRI LS thanks

## (undated) NOTE — LETTER
Patient Name: Irina Loya  YOB: 1945          MRN :  [id number]  Date:  11/17/2021  Referring Physician:  Vikas Corbett    Discharge Summary  Pt has attended 12 visits in Physical Therapy.      Dx: Strain of latissimus dorsi muscle, seq discharged from this episode of care for physical therapy at this time. Patient/Family/Caregiver was advised of these findings, precautions, and treatment options and has agreed to actively participate in planning and for this course of care.     Thank zane

## (undated) NOTE — LETTER
22  RE: Carmelina Cortes    : 1945    Dear Dr. Petr Dasilva    Your patient is being scheduled for a pain management procedure at [facility] within the next 4 weeks. Procedure:  Lumbar SURESH  Physician: Chemo Lowe- Anesthesiologist     Your patient is currently taking Plavix. Chemo Lowe usually recommends the medication be held for 7 days prior to injection. Please verify patient is cleared to proceed with pain management procedures. Clearance Approved   ____________    Clearance Denied       ____________      Comments: ______________________________________________________    Signature: ________________________________  Date: _________________       If you have any questions please feel free to contact our office at [phone], option # 2. Please fax this clearance request to our office at fax # [phone] or send electronically.        Thank you,      [facility]

## (undated) NOTE — LETTER
Patient Name: Kiara Willis  YOB: 1945          MRN :  [id number]  Date:  10/8/2021  Referring Physician:  Piero Bennett    Progress Summary  Pt has attended 8 visits in Physical Therapy.      Dx: Strain of latissimus dorsi muscle, sequel will report being able to perform woodworking for up to 30 min at at time without increase in back pain NOT TESTED  5.  The patient will be independent and adherent in a comprehensive HEP      Plan: Continue skilled Physical Therapy 2 x/week or a total of 6

## (undated) NOTE — LETTER
09/03/21        Josh Gamez  [address] [facility] [address]      Dear Carole Barnett,    [facility] records indicate that you have outstanding lab work and or testing that was ordered for you and has not yet been completed:  Orders Placed This [place]

## (undated) NOTE — LETTER
To Whom It May Concern:    Paulie Nunez is currently under my medical care and may not return to {em school/work:529345608} at this time. Please excuse Ewa Tommy for {NUMBERS 0-10:8392} {days weeks:3323::\"days\"}.  He may return to {em school/work:130

## (undated) NOTE — LETTER
Patient Name: Dev Johnston  -Age / Sex: 1945-A: 78 y  male   Medical Records: [id number] CSN: [id number]    DNAR NOTIFICATION    Your patient listed above has a procedure scheduled at [facility] and has indicated that he/she currently has a DNAR (Do Not Attempt Resuscitattion) with their Advanced Directives.    Please note that you will be asked to address this matter with your patient pre-operatively.  Thank you.      Procedure Date 2023  Procedure LUMBAR 3 AND LUMBAR 4 LAMINECTOMIES WITH MEDIAL FACETECTOMIES AND ALL INDICATED PROCEDURES, N/A - Spine Lumbar    Surgeon(s):  Salvador Spicer MD

## (undated) NOTE — LETTER
[address] [place] [address],  [address]  [address]  [phone]    09/17/20

## (undated) NOTE — LETTER
OUTSIDE TESTING RESULT REQUEST     IMPORTANT: FOR YOUR IMMEDIATE ATTENTION  Please FAX all test results listed below to: [phone]     Testing already done on or about: 10/6/23     * * * * If testing is NOT complete, arrange with patient A.S.A.P. * * * *    Patient Name: Dev Johnston  Surgery Date: 2023  Medical Record: [id number]  CSN: [id number]  : 1945 - A: 78 y     Sex: male  Surgeon(s):  Salvador Spicer MD  Procedure: LUMBAR 3 AND LUMBAR 4 LAMINECTOMIES WITH MEDIAL FACETECTOMIES AND ALL INDICATED PROCEDURES  Anesthesia Type: General     Surgeon: Salvador Spicer MD     The following Testing and Time Line are REQUIRED PER ANESTHESIA     EKG READ AND SIGNED WITHIN   90 days  Chest X-Ray within 6 months  PT/INR within  30 days  PTT within  30 days  Type and Screen for Pre-Admission Testing (must be within 28 days of surgery)  MSSA/MRSA Nasal screening within 30 days      Thank You,   Sent by: SELENA Munoz

## (undated) NOTE — Clinical Note
Tim, I saw Skip Johnston today. I am going to give him a dose of IV iron (INfed). I am not convinced that this is the cause of his symptoms. I recommended that he proceed with the GI work up. I will repeat the cbc and ferritin in 1 month and 2 months.  I did a urinalysis that is positive for infection. Can you manage this. I wanted to make sure that there was no excess urinary bleeding contributing to the iron deficiency. - Josue

## (undated) NOTE — LETTER
Patient Name: Dev Johnston  -Age / Sex: 1945-A: 79 y  male   Medical Records: [id number] CSN: [id number]    DNAR NOTIFICATION    Your patient listed above has a procedure scheduled at [facility] and has indicated that he/she currently has a DNAR (Do Not Attempt Resuscitattion) with their Advanced Directives.    Please note that you will be asked to address this matter with your patient pre-operatively.  Thank you.      Procedure Date 2024  Procedure BRONCHOSCOPY WITH BRONCHIAL ALVEOLAR LAVAGE AND  TRANSBRONCHIAL BIOPSY UNDER MAC WITH FLUOROSCOPY GUIDANCE, N/A    Surgeon(s):  Sheri Johnson MD

## (undated) NOTE — LETTER
22  RE: Reina You    : 1945    Dear Dr. Gaurav Cueto patient is being scheduled for a pain management procedure at [facility] within the next 4 weeks. Procedure: Bilateral SI Joint Injections  Physician: Dr. Wanda Smith- Anesthesiologist     Your patient is currently taking Plavix. Dr. Wanda Smith usually recommends the medication be held for 7 days prior to injection. Please verify patient is cleared to proceed with pain management procedures. Clearance Approved   ____________    Clearance Denied       ____________      Comments: ______________________________________________________    Signature: ________________________________  Date: _________________       If you have any questions please feel free to contact our office at [phone], option # 2. Please fax this clearance request to our office at fax # [phone] or send electronically.        Thank you,      [employer]

## (undated) NOTE — LETTER
22  RE: Jordon Price    : 1945    Dear Dr. Jamel Galindo    Your patient is being scheduled for a pain management procedure at [facility] within the next 4 weeks. Procedure: Left Lumbar Facet Injection at L3-4, L4-5   Physician: Dr. Quiana Bland- Anesthesiologist     Your patient is currently taking Plavix. Dr. Quiana Bland usually recommends the medication be held for 7 days prior to injection. Please verify patient is cleared to proceed with pain management procedures. Clearance Approved   ____________    Clearance Denied       ____________      Comments: ______________________________________________________    Signature: ________________________________  Date: _________________       If you have any questions please feel free to contact our office at [phone], option # 2. Please fax this clearance request to our office at fax # [phone] or send electronically.        Thank you,      [employer]